# Patient Record
Sex: FEMALE | Race: WHITE | Employment: OTHER | URBAN - METROPOLITAN AREA
[De-identification: names, ages, dates, MRNs, and addresses within clinical notes are randomized per-mention and may not be internally consistent; named-entity substitution may affect disease eponyms.]

---

## 2022-01-31 DIAGNOSIS — N18.9 ANEMIA OF CHRONIC RENAL FAILURE, UNSPECIFIED CKD STAGE: ICD-10-CM

## 2022-01-31 DIAGNOSIS — D50.9 IRON DEFICIENCY ANEMIA, UNSPECIFIED IRON DEFICIENCY ANEMIA TYPE: ICD-10-CM

## 2022-01-31 DIAGNOSIS — D63.1 ANEMIA OF CHRONIC RENAL FAILURE, UNSPECIFIED CKD STAGE: ICD-10-CM

## 2022-02-10 ENCOUNTER — APPOINTMENT (OUTPATIENT)
Dept: CT IMAGING | Age: 72
End: 2022-02-10
Payer: MEDICARE

## 2022-02-10 ENCOUNTER — HOSPITAL ENCOUNTER (OUTPATIENT)
Dept: CT IMAGING | Age: 72
Discharge: HOME | End: 2022-02-12
Payer: MEDICARE

## 2022-02-10 VITALS — HEIGHT: 60 IN | BODY MASS INDEX: 27.48 KG/M2 | WEIGHT: 140 LBS

## 2022-02-10 DIAGNOSIS — K85.92 ACUTE PANCREATITIS WITH INFECTED NECROSIS, UNSPECIFIED PANCREATITIS TYPE: ICD-10-CM

## 2022-02-10 DIAGNOSIS — R07.9 CHEST PAIN, UNSPECIFIED TYPE: ICD-10-CM

## 2022-02-10 LAB
GFR AFRICAN AMERICAN: >60
GFR NON-AFRICAN AMERICAN: >60
PERFORMED ON: NORMAL
POC CREATININE: 0.6 MG/DL (ref 0.6–1.2)
POC SAMPLE TYPE: NORMAL

## 2022-02-10 PROCEDURE — 74177 CT ABD & PELVIS W/CONTRAST: CPT

## 2022-02-10 PROCEDURE — 2500000003 HC RX 250 WO HCPCS: Performed by: INTERNAL MEDICINE

## 2022-02-10 PROCEDURE — 71275 CT ANGIOGRAPHY CHEST: CPT

## 2022-02-10 PROCEDURE — 6360000004 HC RX CONTRAST MEDICATION: Performed by: INTERNAL MEDICINE

## 2022-02-10 RX ORDER — SODIUM CHLORIDE 0.9 % (FLUSH) 0.9 %
10 SYRINGE (ML) INJECTION 2 TIMES DAILY
Status: DISCONTINUED | OUTPATIENT
Start: 2022-02-10 | End: 2022-02-13 | Stop reason: HOSPADM

## 2022-02-10 RX ADMIN — BARIUM SULFATE 450 ML: 20 SUSPENSION ORAL at 16:45

## 2022-02-10 RX ADMIN — IOPAMIDOL 100 ML: 612 INJECTION, SOLUTION INTRAVENOUS at 16:43

## 2022-02-14 ENCOUNTER — HOSPITAL ENCOUNTER (OUTPATIENT)
Dept: INFUSION THERAPY | Age: 72
Setting detail: INFUSION SERIES
Discharge: HOME | End: 2022-02-14
Payer: MEDICARE

## 2022-02-14 VITALS
SYSTOLIC BLOOD PRESSURE: 118 MMHG | RESPIRATION RATE: 18 BRPM | HEART RATE: 65 BPM | TEMPERATURE: 98.5 F | DIASTOLIC BLOOD PRESSURE: 58 MMHG

## 2022-02-14 DIAGNOSIS — D63.1 ANEMIA OF CHRONIC RENAL FAILURE, UNSPECIFIED CKD STAGE: ICD-10-CM

## 2022-02-14 DIAGNOSIS — D50.9 IRON DEFICIENCY ANEMIA, UNSPECIFIED IRON DEFICIENCY ANEMIA TYPE: Primary | ICD-10-CM

## 2022-02-14 DIAGNOSIS — N18.9 ANEMIA OF CHRONIC RENAL FAILURE, UNSPECIFIED CKD STAGE: ICD-10-CM

## 2022-02-14 PROCEDURE — 6360000002 HC RX W HCPCS: Performed by: INTERNAL MEDICINE

## 2022-02-14 PROCEDURE — 2580000003 HC RX 258: Performed by: INTERNAL MEDICINE

## 2022-02-14 PROCEDURE — 96365 THER/PROPH/DIAG IV INF INIT: CPT

## 2022-02-14 RX ORDER — DICYCLOMINE HYDROCHLORIDE 10 MG/1
10 CAPSULE ORAL 3 TIMES DAILY
COMMUNITY
Start: 2022-01-18 | End: 2022-10-24 | Stop reason: ALTCHOICE

## 2022-02-14 RX ORDER — CITALOPRAM 40 MG/1
40 TABLET ORAL DAILY
COMMUNITY

## 2022-02-14 RX ORDER — POLYETHYLENE GLYCOL 3350 17 G/17G
17 POWDER, FOR SOLUTION ORAL DAILY
COMMUNITY
Start: 2022-01-27 | End: 2022-10-24 | Stop reason: ALTCHOICE

## 2022-02-14 RX ORDER — LEVOTHYROXINE SODIUM 0.05 MG/1
50 TABLET ORAL DAILY
COMMUNITY
Start: 2022-01-27

## 2022-02-14 RX ORDER — PANTOPRAZOLE SODIUM 40 MG/1
40 TABLET, DELAYED RELEASE ORAL DAILY
COMMUNITY
Start: 2022-01-18 | End: 2022-10-24 | Stop reason: ALTCHOICE

## 2022-02-14 RX ORDER — TRAZODONE HYDROCHLORIDE 150 MG/1
150 TABLET ORAL NIGHTLY
COMMUNITY

## 2022-02-14 RX ADMIN — IRON SUCROSE 200 MG: 20 INJECTION, SOLUTION INTRAVENOUS at 13:11

## 2022-02-14 NOTE — FLOWSHEET NOTE
Patient to the ambulatory for her Iron infusion. Vital signs taken. Denies any discomfort. Call light within reach. Education given both verbal and handout. Verbalized understanding.

## 2022-02-14 NOTE — FLOWSHEET NOTE
Addended by: NICK MIGUEL on: 2/8/2018 12:44 PM     Modules accepted: Orders     Infusion is complete. Tolerated well. Observation started. Call light within reach.

## 2022-10-24 ENCOUNTER — OFFICE VISIT (OUTPATIENT)
Dept: FAMILY MEDICINE CLINIC | Age: 72
End: 2022-10-24
Payer: COMMERCIAL

## 2022-10-24 VITALS
SYSTOLIC BLOOD PRESSURE: 160 MMHG | TEMPERATURE: 98.7 F | WEIGHT: 157.8 LBS | OXYGEN SATURATION: 98 % | HEIGHT: 60 IN | HEART RATE: 70 BPM | BODY MASS INDEX: 30.98 KG/M2 | DIASTOLIC BLOOD PRESSURE: 82 MMHG

## 2022-10-24 DIAGNOSIS — R03.0 ELEVATED BLOOD PRESSURE READING: Primary | ICD-10-CM

## 2022-10-24 DIAGNOSIS — F31.9 BIPOLAR 1 DISORDER, DEPRESSED (HCC): ICD-10-CM

## 2022-10-24 DIAGNOSIS — H53.9 ABNORMAL VISION: ICD-10-CM

## 2022-10-24 DIAGNOSIS — R26.89 LOSS OF BALANCE: ICD-10-CM

## 2022-10-24 PROBLEM — I40.1: Status: ACTIVE | Noted: 2022-07-20

## 2022-10-24 PROBLEM — R41.89 OTHER SYMPTOMS AND SIGNS INVOLVING COGNITIVE FUNCTIONS AND AWARENESS: Status: ACTIVE | Noted: 2022-07-29

## 2022-10-24 PROBLEM — G93.40 ENCEPHALOPATHY, UNSPECIFIED: Status: ACTIVE | Noted: 2022-07-29

## 2022-10-24 PROBLEM — M62.81 MUSCLE WEAKNESS (GENERALIZED): Status: ACTIVE | Noted: 2022-07-11

## 2022-10-24 PROBLEM — G72.9 MYOPATHY, UNSPECIFIED: Status: ACTIVE | Noted: 2022-08-24

## 2022-10-24 PROCEDURE — 1123F ACP DISCUSS/DSCN MKR DOCD: CPT | Performed by: FAMILY MEDICINE

## 2022-10-24 PROCEDURE — 99203 OFFICE O/P NEW LOW 30 MIN: CPT | Performed by: FAMILY MEDICINE

## 2022-10-24 SDOH — ECONOMIC STABILITY: FOOD INSECURITY: WITHIN THE PAST 12 MONTHS, THE FOOD YOU BOUGHT JUST DIDN'T LAST AND YOU DIDN'T HAVE MONEY TO GET MORE.: NEVER TRUE

## 2022-10-24 SDOH — ECONOMIC STABILITY: FOOD INSECURITY: WITHIN THE PAST 12 MONTHS, YOU WORRIED THAT YOUR FOOD WOULD RUN OUT BEFORE YOU GOT MONEY TO BUY MORE.: NEVER TRUE

## 2022-10-24 ASSESSMENT — PATIENT HEALTH QUESTIONNAIRE - PHQ9
SUM OF ALL RESPONSES TO PHQ QUESTIONS 1-9: 0
2. FEELING DOWN, DEPRESSED OR HOPELESS: 0
SUM OF ALL RESPONSES TO PHQ9 QUESTIONS 1 & 2: 0
SUM OF ALL RESPONSES TO PHQ QUESTIONS 1-9: 0
1. LITTLE INTEREST OR PLEASURE IN DOING THINGS: 0

## 2022-10-24 ASSESSMENT — SOCIAL DETERMINANTS OF HEALTH (SDOH): HOW HARD IS IT FOR YOU TO PAY FOR THE VERY BASICS LIKE FOOD, HOUSING, MEDICAL CARE, AND HEATING?: NOT HARD AT ALL

## 2022-10-24 NOTE — PROGRESS NOTES
Diagnosis Orders   1. Elevated blood pressure reading        2. Bipolar 1 disorder, depressed ([address]        3. Loss of balance        4. Abnormal vision          Return in about 4 weeks (around 11/21/2022) for for review of outcome of today's recommendation. Patient Instructions   Requesting records from prior physician. If most recent PCP does not have [place] and neurology records we will request them separately. Patient will be bringing in home blood pressure cuff to check for accuracy. If inaccurate will consider getting [facility] [employer] brand arm blood pressure cuff. Monitor blood pressure at home for 1 week and follow-up appointment then    Commend patient continue with specialist neurology and eye doctor to keep those problems up-to-date    Subjective:      Patient ID: Mick King is a 66 y.o. female who presents for:  Chief Complaint   Patient presents with    [facility]     Last PCP visit  about 1 month ago   Last mammo - nov of last year   Last colonoscopy - per pt 4 years ago   Recent labs - from Feb 2022    [address]       She has seen neurology for being off balance with muscle pain and muscle weakness. 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 She decided not to pursue it. Not sure if it will progress due to not doing the testing to determine final diagnosis. Last year moved to [facility] due to  dying.       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 History reviewed. No pertinent past medical history. Past Surgical History:   Procedure Laterality Date    CHOLECYSTECTOMY      HYSTERECTOMY (CERVIX STATUS UNKNOWN)       Social History     Socioeconomic History    Marital status:      Spouse name: Not on file    Number of children: Not on file    Years of education: Not on file    Highest education level: Not on file   Occupational History    Not on file   Tobacco Use    Smoking status: Former     Types: Cigarettes     Quit date: 0     Years since quittin.8    Smokeless tobacco: Never   Substance and Sexual Activity    Alcohol use: Yes     Comment: social    Drug use: Never    Sexual activity: Not on file   Other Topics Concern    Not on file   Social History Narrative    Not on file     Social Determinants of Health     Financial Resource Strain: Low Risk     Difficulty of Paying Living Expenses: Not hard at all   Food Insecurity: No Food Insecurity    Worried About Running Out of Food in the Last Year: Never true    Ran Out of Food in the Last Year: Never true   Transportation Needs: Not on file   Physical Activity: Not on file   Stress: Not on file   Social Connections: Not on file   Intimate Partner Violence: Not on file   Housing Stability: Not on file     History reviewed. No pertinent family history. Allergies:  Patient has no known allergies. Review of Systems    Objective:   BP (!) 160/82   Pulse 70   Temp 98.7 °F (37.1 °C)   Ht 5' (1.524 m)   Wt 157 lb 12.8 oz (71.6 kg)   SpO2 98%   BMI 30.82 kg/m²     Physical Exam    No results found for this visit on 10/24/22.     Recent Results (from the past 2016 hour(s))   MISC ARUP 1    Collection Time: 22  3:28 PM   Result Value Ref Range    [place] [facility] [id number],[id number]     Misc Test Order SEE NOTE    CK    Collection Time: 22  3:28 PM   Result Value Ref Range    Total  (H) 0 - 170 U/L       [] Pt was seen by provider for Minutes  Counseling and coordination of care was done for all assessment diagnosis listed for today with patient and any family/friend present. More than 50% of this visit was spent coordinating current care, obtaining information for prior records, and counseling for current plan of action. Assessment:       Diagnosis Orders   1. Elevated blood pressure reading        2. Bipolar 1 disorder, depressed ([facility] [address]        3. Loss of balance        4. Abnormal vision              No orders of the defined types were placed in this encounter. No orders of the defined types were placed in this encounter. Medication List            Accurate as of October 24, 2022  7:08 PM. If you have any questions, ask your nurse or doctor. CONTINUE taking these medications      citalopram 40 MG tablet  Commonly known as: CELEXA     levothyroxine 50 MCG tablet  Commonly known as: SYNTHROID     traZODone 150 MG tablet  Commonly known as: DESYREL            STOP taking these medications      dicyclomine 10 MG capsule  Commonly known as: BENTYL  Stopped by: John Dawn MD     GaviLAX 17 GM/SCOOP powder  Generic drug: polyethylene glycol  Stopped by: John Dawn MD     pantoprazole 40 MG tablet  Commonly known as: PROTONIX  Stopped by: John Dawn MD                Plan:   Return in about 4 weeks (around 11/21/2022) for for review of outcome of today's recommendation. Patient Instructions   Requesting records from prior physician. If most recent PCP does not have [place] and neurology records we will request them separately. Patient will be bringing in home blood pressure cuff to check for accuracy. If inaccurate will consider getting drug [employer] brand arm blood pressure cuff.   Monitor blood pressure at home for 1 week and follow-up appointment then    Commend patient continue with specialist neurology and eye doctor to keep those problems up-to-date    This note was partially created with the assistance of dictation. This may lead to grammatical or spelling errors. Gian Pressley M.D.

## 2022-10-24 NOTE — PATIENT INSTRUCTIONS
Requesting records from prior physician. If most recent PCP does not have [facility] and neurology records we will request them separately. Patient will be bringing in home blood pressure cuff to check for accuracy. If inaccurate will consider getting drug [employer] brand arm blood pressure cuff.   Monitor blood pressure at home for 1 week and follow-up appointment then    Commend patient continue with specialist neurology and eye doctor to keep those problems up-to-date

## 2022-11-04 ENCOUNTER — NURSE ONLY (OUTPATIENT)
Dept: FAMILY MEDICINE CLINIC | Age: 72
End: 2022-11-04

## 2022-11-04 VITALS — SYSTOLIC BLOOD PRESSURE: 134 MMHG | DIASTOLIC BLOOD PRESSURE: 64 MMHG

## 2022-11-04 DIAGNOSIS — Z01.30 BP CHECK: Primary | ICD-10-CM

## 2022-11-04 NOTE — PROGRESS NOTES
Pt in today with daughter. Machine and manual BP match up. Pt has appt on Mon. Daughter states that she will not be with her, son will. They are asking if you have received outside records yet and if so, when was last thyroid drawn? Pt struggling with fatigue and they are not sure if it is thyroid related or other meds.

## 2022-11-07 ENCOUNTER — OFFICE VISIT (OUTPATIENT)
Dept: FAMILY MEDICINE CLINIC | Age: 72
End: 2022-11-07
Payer: COMMERCIAL

## 2022-11-07 VITALS
BODY MASS INDEX: 31.57 KG/M2 | HEIGHT: 60 IN | SYSTOLIC BLOOD PRESSURE: 144 MMHG | WEIGHT: 160.8 LBS | HEART RATE: 73 BPM | OXYGEN SATURATION: 98 % | TEMPERATURE: 98.7 F | DIASTOLIC BLOOD PRESSURE: 72 MMHG

## 2022-11-07 DIAGNOSIS — D50.8 OTHER IRON DEFICIENCY ANEMIA: ICD-10-CM

## 2022-11-07 DIAGNOSIS — E66.09 CLASS 1 OBESITY DUE TO EXCESS CALORIES WITHOUT SERIOUS COMORBIDITY WITH BODY MASS INDEX (BMI) OF 31.0 TO 31.9 IN ADULT: ICD-10-CM

## 2022-11-07 DIAGNOSIS — I10 UNCONTROLLED HYPERTENSION: Primary | ICD-10-CM

## 2022-11-07 PROBLEM — F33.9 RECURRENT MAJOR DEPRESSION (HCC): Status: ACTIVE | Noted: 2022-11-07

## 2022-11-07 PROBLEM — E66.9 OBESITY WITH BODY MASS INDEX 30 OR GREATER: Status: ACTIVE | Noted: 2022-11-07

## 2022-11-07 PROCEDURE — 99213 OFFICE O/P EST LOW 20 MIN: CPT | Performed by: FAMILY MEDICINE

## 2022-11-07 PROCEDURE — 3074F SYST BP LT 130 MM HG: CPT | Performed by: FAMILY MEDICINE

## 2022-11-07 PROCEDURE — 1123F ACP DISCUSS/DSCN MKR DOCD: CPT | Performed by: FAMILY MEDICINE

## 2022-11-07 PROCEDURE — 3078F DIAST BP <80 MM HG: CPT | Performed by: FAMILY MEDICINE

## 2022-11-07 ASSESSMENT — ENCOUNTER SYMPTOMS
PHOTOPHOBIA: 0
ABDOMINAL PAIN: 0
CHEST TIGHTNESS: 0
SHORTNESS OF BREATH: 0
ABDOMINAL DISTENTION: 0

## 2022-11-07 NOTE — PROGRESS NOTES
Diagnosis Orders   1. Uncontrolled hypertension  TSH with Reflex    Lipid Panel    Basic Metabolic Panel      2. Other iron deficiency anemia  CBC with Auto Differential    Iron and TIBC      3. Class 1 obesity due to excess calories without serious comorbidity with body mass index (BMI) of 31.0 to 31.9 in adult          Return in about 4 weeks (around 2022). Patient Instructions   Patient will have health maintenance monitoring labs done today. Patient is going to focus on diet and exercise. Gradually increasing her exercise from 5 to 10 minutes a day to half hour at least 5 days a week. Continue to monitor blood pressure at least few days a week and rotating times of day. Follow-up appointment in 4 weeks    Subjective:      Patient ID: Mei Jose is a 66 y.o. female who presents for:  Chief Complaint   Patient presents with    Blood Pressure Check     X 2 week follow up        Patient is here for blood pressure follow-up. She has been checking it at home and the range has been systolic 108-146 and diastolic 46-23. She is interested in trying to do lifestyle change in order to control her blood pressure. Her   about 10 months ago and she has gained 20 pounds. She knows that is contributing and she would like to avoid further medication. We reviewed some of the medical history that is been obtained from her primary care previously and it appears she was diagnosed with a muscular disorder that is not defined completely. But myasthenia gravis has been ruled out. Current Outpatient Medications on File Prior to Visit   Medication Sig Dispense Refill    levothyroxine (SYNTHROID) 50 MCG tablet Take 50 mcg by mouth daily      citalopram (CELEXA) 40 MG tablet Take 40 mg by mouth daily      traZODone (DESYREL) 150 MG tablet Take 150 mg by mouth nightly       No current facility-administered medications on file prior to visit. History reviewed.  No pertinent past medical history. Past Surgical History:   Procedure Laterality Date    CHOLECYSTECTOMY      HYSTERECTOMY (CERVIX STATUS UNKNOWN)       Social History     Socioeconomic History    Marital status:      Spouse name: Not on file    Number of children: Not on file    Years of education: Not on file    Highest education level: Not on file   Occupational History    Not on file   Tobacco Use    Smoking status: Former     Packs/day: 0.50     Years: 10.00     Pack years: 5.00     Types: Cigarettes     Quit date: 0     Years since quittin.8    Smokeless tobacco: Never   Substance and Sexual Activity    Alcohol use: Yes     Comment: social    Drug use: Never    Sexual activity: Not on file   Other Topics Concern    Not on file   Social History Narrative    Not on file     Social Determinants of Health     Financial Resource Strain: Low Risk     Difficulty of Paying Living Expenses: Not hard at all   Food Insecurity: No Food Insecurity    Worried About Running Out of Food in the Last Year: Never true    Ran Out of Food in the Last Year: Never true   Transportation Needs: Not on file   Physical Activity: Not on file   Stress: Not on file   Social Connections: Not on file   Intimate Partner Violence: Not on file   Housing Stability: Not on file     History reviewed. No pertinent family history. Allergies:  Patient has no known allergies. Review of Systems   Constitutional:  Negative for activity change, appetite change, diaphoresis and unexpected weight change. Eyes:  Negative for photophobia and visual disturbance. Respiratory:  Negative for chest tightness and shortness of breath. No orthopnea   Cardiovascular:  Negative for chest pain, palpitations and leg swelling. Gastrointestinal:  Negative for abdominal distention and abdominal pain. Genitourinary:  Negative for flank pain and frequency. Musculoskeletal:  Negative for gait problem and joint swelling.    Neurological:  Negative for dizziness, weakness, light-headedness and headaches. Psychiatric/Behavioral:  Negative for confusion. Objective:   BP (!) 144/72   Pulse 73   Temp 98.7 °F (37.1 °C)   Ht 5' (1.524 m)   Wt 160 lb 12.8 oz (72.9 kg)   SpO2 98%   BMI 31.40 kg/m²     Physical Exam  Constitutional:       General: She is not in acute distress. Appearance: She is well-developed. She is not diaphoretic. HENT:      Head: Normocephalic and atraumatic. Right Ear: External ear normal.      Left Ear: External ear normal.      Nose: Nose normal.   Eyes:      General:         Right eye: No discharge. Left eye: No discharge. Conjunctiva/sclera: Conjunctivae normal.      Pupils: Pupils are equal, round, and reactive to light. Neck:      Thyroid: No thyromegaly. Trachea: No tracheal deviation. Cardiovascular:      Rate and Rhythm: Normal rate and regular rhythm. Pulmonary:      Effort: Pulmonary effort is normal. No respiratory distress. Abdominal:      General: There is no distension. Musculoskeletal:      Cervical back: Neck supple. Skin:     General: Skin is warm and dry. Findings: No bruising or rash. Neurological:      Mental Status: She is alert. Coordination: Coordination normal.   Psychiatric:         Thought Content: Thought content normal.         Judgment: Judgment normal.       No results found for this visit on 11/07/22. No results found for this or any previous visit (from the past 2016 hour(s)). [] Pt was seen by provider for      Minutes  Counseling and coordination of care was done for all assessment diagnosis listed for today with patient and any family/friend present. More than 50% of this visit was spent coordinating current care, obtaining information for prior records, and counseling for current plan of action. Assessment:       Diagnosis Orders   1. Uncontrolled hypertension  TSH with Reflex    Lipid Panel    Basic Metabolic Panel      2.  Other iron deficiency anemia  CBC with Auto Differential    Iron and TIBC      3. Class 1 obesity due to excess calories without serious comorbidity with body mass index (BMI) of 31.0 to 31.9 in adult              Orders Placed This Encounter   Procedures    TSH with Reflex     Standing Status:   Future     Standing Expiration Date:   11/7/2023    Lipid Panel     Standing Status:   Future     Standing Expiration Date:   11/7/2023    CBC with Auto Differential     Standing Status:   Future     Standing Expiration Date:   11/7/2023    Iron and TIBC     Standing Status:   Future     Standing Expiration Date:   93/4/5144    Basic Metabolic Panel     Standing Status:   Future     Standing Expiration Date:   11/7/2023       No orders of the defined types were placed in this encounter. Medication List            Accurate as of November 7, 2022  4:23 PM. If you have any questions, ask your nurse or doctor. CONTINUE taking these medications      citalopram 40 MG tablet  Commonly known as: CELEXA     levothyroxine 50 MCG tablet  Commonly known as: SYNTHROID     traZODone 150 MG tablet  Commonly known as: DESYREL                Plan:   Return in about 4 weeks (around 12/5/2022). Patient Instructions   Patient will have health maintenance monitoring labs done today. Patient is going to focus on diet and exercise. Gradually increasing her exercise from 5 to 10 minutes a day to half hour at least 5 days a week. Continue to monitor blood pressure at least few days a week and rotating times of day. Follow-up appointment in 4 weeks    This note was partially created with the assistance of dictation. This may lead to grammatical or spelling errors. Gian Dominguez M.D.

## 2022-11-07 NOTE — PATIENT INSTRUCTIONS
Patient will have health maintenance monitoring labs done today. Patient is going to focus on diet and exercise. Gradually increasing her exercise from 5 to 10 minutes a day to half hour at least 5 days a week. Continue to monitor blood pressure at least few days a week and rotating times of day.     Follow-up appointment in 4 weeks

## 2022-11-08 ENCOUNTER — TELEPHONE (OUTPATIENT)
Dept: FAMILY MEDICINE CLINIC | Age: 72
End: 2022-11-08

## 2022-11-09 DIAGNOSIS — D50.8 OTHER IRON DEFICIENCY ANEMIA: ICD-10-CM

## 2022-11-09 DIAGNOSIS — I10 UNCONTROLLED HYPERTENSION: ICD-10-CM

## 2022-11-09 LAB
ANION GAP SERPL CALCULATED.3IONS-SCNC: 16 MEQ/L (ref 9–15)
BASOPHILS ABSOLUTE: 0.1 K/UL (ref 0–0.2)
BASOPHILS RELATIVE PERCENT: 0.8 %
BUN BLDV-MCNC: 15 MG/DL (ref 8–23)
CALCIUM SERPL-MCNC: 9.8 MG/DL (ref 8.5–9.9)
CHLORIDE BLD-SCNC: 102 MEQ/L (ref 95–107)
CHOLESTEROL, TOTAL: 228 MG/DL (ref 0–199)
CO2: 23 MEQ/L (ref 20–31)
CREAT SERPL-MCNC: 0.7 MG/DL (ref 0.5–0.9)
EOSINOPHILS ABSOLUTE: 0.1 K/UL (ref 0–0.7)
EOSINOPHILS RELATIVE PERCENT: 1.5 %
GFR SERPL CREATININE-BSD FRML MDRD: >60 ML/MIN/{1.73_M2}
GLUCOSE BLD-MCNC: 99 MG/DL (ref 70–99)
HCT VFR BLD CALC: 45 % (ref 37–47)
HDLC SERPL-MCNC: 90 MG/DL (ref 40–59)
HEMOGLOBIN: 14.7 G/DL (ref 12–16)
LDL CHOLESTEROL CALCULATED: 117 MG/DL (ref 0–129)
LYMPHOCYTES ABSOLUTE: 2 K/UL (ref 1–4.8)
LYMPHOCYTES RELATIVE PERCENT: 26.7 %
MCH RBC QN AUTO: 31.3 PG (ref 27–31.3)
MCHC RBC AUTO-ENTMCNC: 32.8 % (ref 33–37)
MCV RBC AUTO: 95.4 FL (ref 79.4–94.8)
MONOCYTES ABSOLUTE: 0.6 K/UL (ref 0.2–0.8)
MONOCYTES RELATIVE PERCENT: 7.9 %
NEUTROPHILS ABSOLUTE: 4.8 K/UL (ref 1.4–6.5)
NEUTROPHILS RELATIVE PERCENT: 63.1 %
PDW BLD-RTO: 14.3 % (ref 11.5–14.5)
PLATELET # BLD: 230 K/UL (ref 130–400)
POTASSIUM SERPL-SCNC: 5 MEQ/L (ref 3.4–4.9)
RBC # BLD: 4.72 M/UL (ref 4.2–5.4)
SODIUM BLD-SCNC: 141 MEQ/L (ref 135–144)
TRIGL SERPL-MCNC: 103 MG/DL (ref 0–150)
TSH REFLEX: 1.91 UIU/ML (ref 0.44–3.86)
WBC # BLD: 7.6 K/UL (ref 4.8–10.8)

## 2022-11-10 LAB
IRON SATURATION: 23 % (ref 20–55)
IRON: 85 UG/DL (ref 37–145)
TOTAL IRON BINDING CAPACITY: 377 UG/DL (ref 250–450)
UNSATURATED IRON BINDING CAPACITY: 292 UG/DL (ref 112–347)

## 2022-12-07 ENCOUNTER — OFFICE VISIT (OUTPATIENT)
Dept: FAMILY MEDICINE CLINIC | Age: 72
End: 2022-12-07
Payer: COMMERCIAL

## 2022-12-07 VITALS
HEART RATE: 67 BPM | WEIGHT: 159.4 LBS | SYSTOLIC BLOOD PRESSURE: 124 MMHG | TEMPERATURE: 98 F | HEIGHT: 60 IN | OXYGEN SATURATION: 97 % | BODY MASS INDEX: 31.29 KG/M2 | DIASTOLIC BLOOD PRESSURE: 70 MMHG

## 2022-12-07 DIAGNOSIS — D50.8 OTHER IRON DEFICIENCY ANEMIA: ICD-10-CM

## 2022-12-07 DIAGNOSIS — R03.0 ELEVATED BLOOD PRESSURE READING: Primary | ICD-10-CM

## 2022-12-07 DIAGNOSIS — J40 BRONCHITIS: ICD-10-CM

## 2022-12-07 DIAGNOSIS — F31.9 BIPOLAR 1 DISORDER, DEPRESSED (HCC): ICD-10-CM

## 2022-12-07 PROCEDURE — 1123F ACP DISCUSS/DSCN MKR DOCD: CPT | Performed by: FAMILY MEDICINE

## 2022-12-07 PROCEDURE — 99214 OFFICE O/P EST MOD 30 MIN: CPT | Performed by: FAMILY MEDICINE

## 2022-12-07 RX ORDER — ESCITALOPRAM OXALATE 10 MG/1
10 TABLET ORAL DAILY
Qty: 30 TABLET | Refills: 5 | Status: SHIPPED | OUTPATIENT
Start: 2022-12-07

## 2022-12-07 RX ORDER — AZITHROMYCIN 250 MG/1
250 TABLET, FILM COATED ORAL SEE ADMIN INSTRUCTIONS
Qty: 6 TABLET | Refills: 0 | Status: SHIPPED | OUTPATIENT
Start: 2022-12-07 | End: 2022-12-12

## 2022-12-07 RX ORDER — ESCITALOPRAM OXALATE 10 MG/1
10 TABLET ORAL DAILY
COMMUNITY
End: 2022-12-07 | Stop reason: SDUPTHER

## 2022-12-07 ASSESSMENT — ENCOUNTER SYMPTOMS
ABDOMINAL DISTENTION: 0
CHEST TIGHTNESS: 0
COUGH: 1
PHOTOPHOBIA: 0
SHORTNESS OF BREATH: 0
WHEEZING: 0
ABDOMINAL PAIN: 0

## 2022-12-07 NOTE — PROGRESS NOTES
Diagnosis Orders   1. Elevated blood pressure reading        2. Other iron deficiency anemia        3. Bipolar 1 disorder, depressed (HCC)  escitalopram (LEXAPRO) 10 MG tablet      4. Bronchitis  azithromycin (ZITHROMAX) 250 MG tablet        Return for for routine major medical condition management. Patient Instructions   Recommended pt to use Mucinex 1200 mg twice daily for congestion and cough for a week. No change in medication for medical conditions    Zithromax daily for bronchitis until gone    Lexapro controlling mood disorder that is lowering bp. No need for hypertensive meds    Subjective:      Patient ID: Vandana Avitia is a 66 y.o. female who presents for:  Chief Complaint   Patient presents with    1 Month Follow-Up    Hypertension    Discuss Medications     Pt asking if you will fill Lexapro. States originally getting from Psychiatrist in [place] but she is no longer seeing them    Thyroid Problem     Asking about levels and continuing med       Patient denies any cardiovascular symptoms. See review of systems. She has a deep chest cough for which she is bringing up green sputum without fever at this time. No treatment from home. Wondered if she could stop her thyroid medication so we talked about the pathology of the disease and how the medication works with. Feels good on her Lexapro. More comfortable. Blood pressure is well controlled. Current Outpatient Medications on File Prior to Visit   Medication Sig Dispense Refill    levothyroxine (SYNTHROID) 50 MCG tablet Take 50 mcg by mouth daily      traZODone (DESYREL) 150 MG tablet Take 150 mg by mouth nightly       No current facility-administered medications on file prior to visit. History reviewed. No pertinent past medical history. Past Surgical History:   Procedure Laterality Date    CHOLECYSTECTOMY      HYSTERECTOMY (CERVIX STATUS UNKNOWN)       Social History     Socioeconomic History    Marital status:   Spouse name: Not on file    Number of children: Not on file    Years of education: Not on file    Highest education level: Not on file   Occupational History    Not on file   Tobacco Use    Smoking status: Former     Packs/day: 0.50     Years: 10.00     Pack years: 5.00     Types: Cigarettes     Quit date: 0     Years since quittin.9    Smokeless tobacco: Never   Substance and Sexual Activity    Alcohol use: Yes     Comment: social    Drug use: Never    Sexual activity: Not on file   Other Topics Concern    Not on file   Social History Narrative    Not on file     Social Determinants of Health     Financial Resource Strain: Low Risk     Difficulty of Paying Living Expenses: Not hard at all   Food Insecurity: No Food Insecurity    Worried About Running Out of Food in the Last Year: Never true    Ran Out of Food in the Last Year: Never true   Transportation Needs: Not on file   Physical Activity: Not on file   Stress: Not on file   Social Connections: Not on file   Intimate Partner Violence: Not on file   Housing Stability: Not on file     History reviewed. No pertinent family history. Allergies:  Patient has no known allergies. Review of Systems   Constitutional:  Negative for activity change, appetite change, diaphoresis and unexpected weight change. Eyes:  Negative for photophobia and visual disturbance. Respiratory:  Positive for cough. Negative for chest tightness, shortness of breath and wheezing. No orthopnea   Cardiovascular:  Negative for chest pain, palpitations and leg swelling. Gastrointestinal:  Negative for abdominal distention and abdominal pain. Genitourinary:  Negative for flank pain and frequency. Musculoskeletal:  Negative for gait problem and joint swelling. Neurological:  Negative for dizziness, weakness, light-headedness and headaches. Psychiatric/Behavioral:  Negative for confusion.       Objective:   /70   Pulse 67   Temp 98 °F (36.7 °C)   Ht 5' (1.524 m)   Wt 159 lb 6.4 oz (72.3 kg)   SpO2 97%   BMI 31.13 kg/m²     Physical Exam  Constitutional:       General: She is not in acute distress. Appearance: She is well-developed. She is not diaphoretic. HENT:      Head: Normocephalic and atraumatic. Right Ear: External ear normal.      Left Ear: External ear normal.      Nose: Nose normal.   Eyes:      General:         Right eye: No discharge. Left eye: No discharge. Conjunctiva/sclera: Conjunctivae normal.      Pupils: Pupils are equal, round, and reactive to light. Neck:      Thyroid: No thyromegaly. Trachea: No tracheal deviation. Cardiovascular:      Rate and Rhythm: Normal rate and regular rhythm. Heart sounds: Normal heart sounds. Pulmonary:      Effort: Pulmonary effort is normal. No respiratory distress. Breath sounds: Normal breath sounds. Abdominal:      General: There is no distension. Musculoskeletal:      Cervical back: Neck supple. Skin:     General: Skin is warm and dry. Findings: No bruising or rash. Neurological:      Mental Status: She is alert. Coordination: Coordination normal.   Psychiatric:         Thought Content: Thought content normal.         Judgment: Judgment normal.       No results found for this visit on 12/07/22.     Recent Results (from the past 2016 hour(s))   Basic Metabolic Panel    Collection Time: 11/09/22  3:21 PM   Result Value Ref Range    Sodium 141 135 - 144 mEq/L    Potassium 5.0 (H) 3.4 - 4.9 mEq/L    Chloride 102 95 - 107 mEq/L    CO2 23 20 - 31 mEq/L    Anion Gap 16 (H) 9 - 15 mEq/L    Glucose 99 70 - 99 mg/dL    BUN 15 8 - 23 mg/dL    Creatinine 0.70 0.50 - 0.90 mg/dL    Est, Glom Filt Rate >60.0 >60    Calcium 9.8 8.5 - 9.9 mg/dL   Iron and TIBC    Collection Time: 11/09/22  3:21 PM   Result Value Ref Range    Iron 85 37 - 145 ug/dL    TIBC 377 250 - 450 ug/dL    Iron Saturation 23 20 - 55 %    UIBC 292 112 - 347 ug/dL   CBC with Auto Differential Collection Time: 11/09/22  3:21 PM   Result Value Ref Range    WBC 7.6 4.8 - 10.8 K/uL    RBC 4.72 4.20 - 5.40 M/uL    Hemoglobin 14.7 12.0 - 16.0 g/dL    Hematocrit 45.0 37.0 - 47.0 %    MCV 95.4 (H) 79.4 - 94.8 fL    MCH 31.3 27.0 - 31.3 pg    MCHC 32.8 (L) 33.0 - 37.0 %    RDW 14.3 11.5 - 14.5 %    Platelets 403 748 - 079 K/uL    Neutrophils % 63.1 %    Lymphocytes % 26.7 %    Monocytes % 7.9 %    Eosinophils % 1.5 %    Basophils % 0.8 %    Neutrophils Absolute 4.8 1.4 - 6.5 K/uL    Lymphocytes Absolute 2.0 1.0 - 4.8 K/uL    Monocytes Absolute 0.6 0.2 - 0.8 K/uL    Eosinophils Absolute 0.1 0.0 - 0.7 K/uL    Basophils Absolute 0.1 0.0 - 0.2 K/uL   Lipid Panel    Collection Time: 11/09/22  3:21 PM   Result Value Ref Range    Cholesterol, Total 228 (H) 0 - 199 mg/dL    Triglycerides 103 0 - 150 mg/dL    HDL 90 (H) 40 - 59 mg/dL    LDL Calculated 117 0 - 129 mg/dL   TSH with Reflex    Collection Time: 11/09/22  3:21 PM   Result Value Ref Range    TSH 1.910 0.440 - 3.860 uIU/mL       [] Pt was seen by provider for      Minutes  Counseling and coordination of care was done for all assessment diagnosis listed for today with patient and any family/friend present. More than 50% of this visit was spent coordinating current care, obtaining information for prior records, and counseling for current plan of action. Assessment:       Diagnosis Orders   1. Elevated blood pressure reading        2. Other iron deficiency anemia        3. Bipolar 1 disorder, depressed (HCC)  escitalopram (LEXAPRO) 10 MG tablet      4. Bronchitis  azithromycin (ZITHROMAX) 250 MG tablet            No orders of the defined types were placed in this encounter.       Orders Placed This Encounter   Medications    escitalopram (LEXAPRO) 10 MG tablet     Sig: Take 1 tablet by mouth daily     Dispense:  30 tablet     Refill:  5    azithromycin (ZITHROMAX) 250 MG tablet     Sig: Take 1 tablet by mouth See Admin Instructions for 5 days 500mg on day 1 followed by 250mg on days 2 - 5     Dispense:  6 tablet     Refill:  0            Medication List            Accurate as of December 7, 2022  4:58 PM. If you have any questions, ask your nurse or doctor. START taking these medications      azithromycin 250 MG tablet  Commonly known as: ZITHROMAX  Take 1 tablet by mouth See Admin Instructions for 5 days 500mg on day 1 followed by 250mg on days 2 - 5  Started by: Melodie Perez MD            CONTINUE taking these medications      escitalopram 10 MG tablet  Commonly known as: Lexapro  Take 1 tablet by mouth daily     levothyroxine 50 MCG tablet  Commonly known as: SYNTHROID     traZODone 150 MG tablet  Commonly known as: DESYREL            STOP taking these medications      citalopram 40 MG tablet  Commonly known as: CELEXA  Stopped by: Melodie Perez MD               Where to Get Your Medications        These medications were sent to [address] [place] [address]      Phone: [phone]   azithromycin 250 MG tablet  escitalopram 10 MG tablet           Plan:   Return for for routine major medical condition management. Patient Instructions   Recommended pt to use Mucinex 1200 mg twice daily for congestion and cough for a week. No change in medication for medical conditions    Zithromax daily for bronchitis until gone    Lexapro controlling mood disorder that is lowering bp. No need for hypertensive meds    This note was partially created with the assistance of dictation. This may lead to grammatical or spelling errors. Gian Hayden M.D.

## 2022-12-07 NOTE — PATIENT INSTRUCTIONS
Recommended pt to use Mucinex 1200 mg twice daily for congestion and cough for a week. No change in medication for medical conditions    Zithromax daily for bronchitis until gone    Lexapro controlling mood disorder that is lowering bp.   No need for hypertensive meds

## 2023-01-24 ENCOUNTER — OFFICE VISIT (OUTPATIENT)
Dept: FAMILY MEDICINE CLINIC | Age: 73
End: 2023-01-24
Payer: COMMERCIAL

## 2023-01-24 VITALS
BODY MASS INDEX: 32.2 KG/M2 | DIASTOLIC BLOOD PRESSURE: 80 MMHG | WEIGHT: 164 LBS | HEART RATE: 60 BPM | OXYGEN SATURATION: 97 % | SYSTOLIC BLOOD PRESSURE: 120 MMHG | TEMPERATURE: 98.6 F | HEIGHT: 60 IN

## 2023-01-24 DIAGNOSIS — J40 BRONCHITIS: Primary | ICD-10-CM

## 2023-01-24 LAB
INFLUENZA A ANTIBODY: NORMAL
INFLUENZA B ANTIBODY: NORMAL
Lab: NORMAL
PERFORMING INSTRUMENT: NORMAL
QC PASS/FAIL: NORMAL
SARS-COV-2, POC: NORMAL

## 2023-01-24 PROCEDURE — 1123F ACP DISCUSS/DSCN MKR DOCD: CPT | Performed by: STUDENT IN AN ORGANIZED HEALTH CARE EDUCATION/TRAINING PROGRAM

## 2023-01-24 PROCEDURE — 87804 INFLUENZA ASSAY W/OPTIC: CPT | Performed by: STUDENT IN AN ORGANIZED HEALTH CARE EDUCATION/TRAINING PROGRAM

## 2023-01-24 PROCEDURE — 99213 OFFICE O/P EST LOW 20 MIN: CPT | Performed by: STUDENT IN AN ORGANIZED HEALTH CARE EDUCATION/TRAINING PROGRAM

## 2023-01-24 PROCEDURE — 87426 SARSCOV CORONAVIRUS AG IA: CPT | Performed by: STUDENT IN AN ORGANIZED HEALTH CARE EDUCATION/TRAINING PROGRAM

## 2023-01-24 RX ORDER — AZITHROMYCIN 250 MG/1
250 TABLET, FILM COATED ORAL SEE ADMIN INSTRUCTIONS
Qty: 6 TABLET | Refills: 0 | Status: SHIPPED | OUTPATIENT
Start: 2023-01-24 | End: 2023-01-29

## 2023-01-24 ASSESSMENT — ENCOUNTER SYMPTOMS
SORE THROAT: 0
SINUS PAIN: 0
VOMITING: 0
SHORTNESS OF BREATH: 0
SINUS PRESSURE: 0
WHEEZING: 0
RHINORRHEA: 0
COUGH: 1
ABDOMINAL PAIN: 0

## 2023-01-24 ASSESSMENT — PATIENT HEALTH QUESTIONNAIRE - PHQ9
SUM OF ALL RESPONSES TO PHQ QUESTIONS 1-9: 0
10. IF YOU CHECKED OFF ANY PROBLEMS, HOW DIFFICULT HAVE THESE PROBLEMS MADE IT FOR YOU TO DO YOUR WORK, TAKE CARE OF THINGS AT HOME, OR GET ALONG WITH OTHER PEOPLE: 0
4. FEELING TIRED OR HAVING LITTLE ENERGY: 0
5. POOR APPETITE OR OVEREATING: 0
9. THOUGHTS THAT YOU WOULD BE BETTER OFF DEAD, OR OF HURTING YOURSELF: 0
6. FEELING BAD ABOUT YOURSELF - OR THAT YOU ARE A FAILURE OR HAVE LET YOURSELF OR YOUR FAMILY DOWN: 0
7. TROUBLE CONCENTRATING ON THINGS, SUCH AS READING THE NEWSPAPER OR WATCHING TELEVISION: 0
8. MOVING OR SPEAKING SO SLOWLY THAT OTHER PEOPLE COULD HAVE NOTICED. OR THE OPPOSITE, BEING SO FIGETY OR RESTLESS THAT YOU HAVE BEEN MOVING AROUND A LOT MORE THAN USUAL: 0
3. TROUBLE FALLING OR STAYING ASLEEP: 0
2. FEELING DOWN, DEPRESSED OR HOPELESS: 0
SUM OF ALL RESPONSES TO PHQ QUESTIONS 1-9: 0

## 2023-01-24 NOTE — PROGRESS NOTES
2023    Timi Montanez (:  1950) is a 67 y.o. female, here for evaluation of the following medical concerns:  Chief Complaint   Patient presents with    Congestion    Cough     Wet cough x 2 days      HPI  URI  Symptoms started 2 days  Endorsing nasal congestion, productive cough (green sputum production)  Denied fevers, chills, wheezing, shortness of breath, GI symptoms  Has tried nothing OTC  No recent sick contacts  No history of asthma or COPD     Internal Administration   First Dose COVID-19, MODERNA BLUE border, Primary or Immunocompromised, (age 12y+), IM, 100 mcg/0.5mL  2022   Second Dose COVID-19, PFIZER Bivalent BOOSTER, DO NOT Dilute, (age 12y+), IM, 30 mcg/0.3 mL   09/10/2022       Last COVID Lab No results found for: SARS-COV-2, SARS-COV-2 RNA, SARS-COV-2, SARS-COV-2, SARS-COV-2 BY PCR, SARS-COV-2, SARS-COV-2, SARS-COV-2     Review of Systems   Constitutional:  Negative for chills and fever. HENT:  Positive for congestion. Negative for rhinorrhea, sinus pressure, sinus pain and sore throat. Respiratory:  Positive for cough. Negative for shortness of breath and wheezing. Cardiovascular:  Negative for chest pain and palpitations. Gastrointestinal:  Negative for abdominal pain and vomiting. Musculoskeletal:  Negative for arthralgias and myalgias. Skin:  Negative for rash and wound. Neurological:  Negative for speech difficulty and light-headedness. Psychiatric/Behavioral:  Negative for suicidal ideas. The patient is not nervous/anxious. Prior to Visit Medications    Medication Sig Taking?  Authorizing Provider   azithromycin (ZITHROMAX) 250 MG tablet Take 1 tablet by mouth See Admin Instructions for 5 days 500mg on day 1 followed by 250mg on days 2 - 5 Yes Nicky Correa DO   escitalopram (LEXAPRO) 10 MG tablet Take 1 tablet by mouth daily Yes Felipa Mcmullen MD   levothyroxine (SYNTHROID) 50 MCG tablet Take 50 mcg by mouth daily Yes Historical Provider, MD traZODone (DESYREL) 150 MG tablet Take 150 mg by mouth nightly Yes Historical Provider, MD        There are no discontinued medications. No Known Allergies    History reviewed. No pertinent past medical history. Past Surgical History:   Procedure Laterality Date    CHOLECYSTECTOMY      HYSTERECTOMY (CERVIX STATUS UNKNOWN)         Social History     Socioeconomic History    Marital status:      Spouse name: Not on file    Number of children: Not on file    Years of education: Not on file    Highest education level: Not on file   Occupational History    Not on file   Tobacco Use    Smoking status: Former     Packs/day: 0.50     Years: 10.00     Pack years: 5.00     Types: Cigarettes     Quit date: 0     Years since quittin.0    Smokeless tobacco: Never   Substance and Sexual Activity    Alcohol use: Yes     Comment: social    Drug use: Never    Sexual activity: Not on file   Other Topics Concern    Not on file   Social History Narrative    Not on file     Social Determinants of Health     Financial Resource Strain: Low Risk     Difficulty of Paying Living Expenses: Not hard at all   Food Insecurity: No Food Insecurity    Worried About Running Out of Food in the Last Year: Never true    Ran Out of Food in the Last Year: Never true   Transportation Needs: Not on file   Physical Activity: Not on file   Stress: Not on file   Social Connections: Not on file   Intimate Partner Violence: Not on file   Housing Stability: Not on file        History reviewed. No pertinent family history. Vitals:    23 1618   BP: 120/80   Pulse: 60   Temp: 98.6 °F (37 °C)   SpO2: 97%   Weight: 164 lb (74.4 kg)   Height: 5' (1.524 m)       Estimated body mass index is 32.03 kg/m² as calculated from the following:    Height as of this encounter: 5' (1.524 m). Weight as of this encounter: 164 lb (74.4 kg). No results for input(s): WBC, RBC, HGB, HCT, MCV, MCH, MCHC, RDW, PLT, MPV in the last 72 hours.     No results for input(s): NA, K, CL, CO2, BUN, CREATININE, GLUCOSE, CALCIUM, PROT, LABALBU, BILITOT, ALKPHOS, AST, ALT in the last 72 hours. No results found for: LABA1C    No results found. Physical Exam  Constitutional:       Appearance: Normal appearance. She is normal weight. HENT:      Head: Normocephalic and atraumatic. Eyes:      Extraocular Movements: Extraocular movements intact. Conjunctiva/sclera: Conjunctivae normal.   Cardiovascular:      Rate and Rhythm: Normal rate and regular rhythm. Pulses: Normal pulses. Heart sounds: Normal heart sounds. Pulmonary:      Effort: Pulmonary effort is normal.      Breath sounds: Normal breath sounds. No wheezing or rales. Skin:     General: Skin is warm. Capillary Refill: Capillary refill takes less than 2 seconds. Findings: No rash. Neurological:      Mental Status: She is alert. Psychiatric:         Mood and Affect: Mood normal.         Thought Content: Thought content normal.         Judgment: Judgment normal.       ASSESSMENT/PLAN:  1. Bronchitis  Point-of-care COVID and influenza negative  Patient will start Mucinex 1200 mg twice daily for congestion and cough  If her symptoms do not improve or worsen in the next few days she will start Z-Justin  Continue supportive care  Follow-up precautions given    - azithromycin (ZITHROMAX) 250 MG tablet; Take 1 tablet by mouth See Admin Instructions for 5 days 500mg on day 1 followed by 250mg on days 2 - 5  Dispense: 6 tablet; Refill: 0    There are no discontinued medications.    ---------------------------------------------------------------------  Side effects, adverse effects of the medication prescribed today, as well as treatment plan/ rationale and result expectations have been discussed with the patient who expresses understanding and desires to proceed. Close follow up to evaluate treatment results and for coordination of care.   I have reviewed the patient's medical history in detail and updated the computerized patient record. As always, patient is advised that if symptoms worsen in any way they will proceed to the nearest emergency room. --------------------------------------------------------------------    Return if symptoms worsen or fail to improve. An  electronic signature was used to authenticate this note.     --Ash Vazquez, DO on 1/24/2023 at 4:58 PM

## 2023-02-02 ENCOUNTER — OFFICE VISIT (OUTPATIENT)
Dept: FAMILY MEDICINE CLINIC | Age: 73
End: 2023-02-02
Payer: COMMERCIAL

## 2023-02-02 VITALS
WEIGHT: 164 LBS | OXYGEN SATURATION: 99 % | DIASTOLIC BLOOD PRESSURE: 78 MMHG | HEART RATE: 73 BPM | SYSTOLIC BLOOD PRESSURE: 132 MMHG | BODY MASS INDEX: 32.2 KG/M2 | TEMPERATURE: 98.2 F | RESPIRATION RATE: 16 BRPM | HEIGHT: 60 IN

## 2023-02-02 DIAGNOSIS — B37.0 ORAL THRUSH: Primary | ICD-10-CM

## 2023-02-02 DIAGNOSIS — J06.9 VIRAL URI WITH COUGH: ICD-10-CM

## 2023-02-02 PROCEDURE — 99213 OFFICE O/P EST LOW 20 MIN: CPT | Performed by: NURSE PRACTITIONER

## 2023-02-02 PROCEDURE — 1123F ACP DISCUSS/DSCN MKR DOCD: CPT | Performed by: NURSE PRACTITIONER

## 2023-02-02 RX ORDER — BENZONATATE 100 MG/1
100 CAPSULE ORAL 3 TIMES DAILY PRN
Qty: 21 CAPSULE | Refills: 0 | Status: SHIPPED | OUTPATIENT
Start: 2023-02-02 | End: 2023-02-09

## 2023-02-02 RX ORDER — CITALOPRAM 40 MG/1
TABLET ORAL
COMMUNITY

## 2023-02-02 SDOH — ECONOMIC STABILITY: INCOME INSECURITY: HOW HARD IS IT FOR YOU TO PAY FOR THE VERY BASICS LIKE FOOD, HOUSING, MEDICAL CARE, AND HEATING?: NOT HARD AT ALL

## 2023-02-02 SDOH — ECONOMIC STABILITY: FOOD INSECURITY: WITHIN THE PAST 12 MONTHS, YOU WORRIED THAT YOUR FOOD WOULD RUN OUT BEFORE YOU GOT MONEY TO BUY MORE.: NEVER TRUE

## 2023-02-02 SDOH — ECONOMIC STABILITY: FOOD INSECURITY: WITHIN THE PAST 12 MONTHS, THE FOOD YOU BOUGHT JUST DIDN'T LAST AND YOU DIDN'T HAVE MONEY TO GET MORE.: NEVER TRUE

## 2023-02-02 SDOH — ECONOMIC STABILITY: HOUSING INSECURITY
IN THE LAST 12 MONTHS, WAS THERE A TIME WHEN YOU DID NOT HAVE A STEADY PLACE TO SLEEP OR SLEPT IN A SHELTER (INCLUDING NOW)?: NO

## 2023-02-02 ASSESSMENT — ENCOUNTER SYMPTOMS
CONSTIPATION: 0
COUGH: 1
SINUS PAIN: 0
APNEA: 0
VOICE CHANGE: 0
SORE THROAT: 0
ABDOMINAL DISTENTION: 0
WHEEZING: 0
DIARRHEA: 0
SHORTNESS OF BREATH: 0
VOMITING: 0
NAUSEA: 0

## 2023-02-02 NOTE — PROGRESS NOTES
Subjective:      Patient ID: Hood Rai is a 67 y.o. female who presents today for:  Chief Complaint   Patient presents with    Cough     Pt report she has a on and off at times persistent cough, feels like something stuck in throat and phlegm noted in throat, constantly clearing throat and feels like it is in chest.   Pt reports she is vaccinated for covid. Pt wanted another ATB and I advised her and son that an ATB will intensify her yeast in mouth. CXR ordered. PT DECLINES ANY COVID OR FLU TESTING TODAY. PT REPORTS SHE WAS TESTED 2 WEEKS AGO AND DECLINES TODAY. Cough  This is a recurrent problem. The current episode started 1 to 4 weeks ago (since bfore the 24th). The problem has been gradually worsening. The cough is Productive of sputum (pt report she \"feels like something stuck in throat and phlegm). Associated symptoms include postnasal drip. Pertinent negatives include no chest pain, chills, ear pain, fever, headaches, rash, sore throat, shortness of breath, weight loss or wheezing. Nothing aggravates the symptoms. Treatments tried: was on an atb. The treatment provided mild relief. Her past medical history is significant for bronchitis. There is no history of asthma, COPD, emphysema or pneumonia. Pt here today with her son. Pt is adamant that Fela Alvarado still has uri infection due to her cough which she reports is \"worse with laying down at night\". Pt was seen by Dr. Kathryn Richard on 1/24/23 and treated with an ATB. Pt declines any SOB, fevers, chills, N/V weakness. History reviewed. No pertinent past medical history. Past Surgical History:   Procedure Laterality Date    CHOLECYSTECTOMY      HYSTERECTOMY (CERVIX STATUS UNKNOWN)       Social History     Socioeconomic History    Marital status:       Spouse name: Not on file    Number of children: Not on file    Years of education: Not on file    Highest education level: Not on file   Occupational History    Not on file   Tobacco Use Smoking status: Former     Packs/day: 0.50     Years: 10.00     Pack years: 5.00     Types: Cigarettes     Quit date: 1972     Years since quittin.1    Smokeless tobacco: Never   Substance and Sexual Activity    Alcohol use: Yes     Comment: social    Drug use: Never    Sexual activity: Not on file   Other Topics Concern    Not on file   Social History Narrative    Not on file     Social Determinants of Health     Financial Resource Strain: Low Risk     Difficulty of Paying Living Expenses: Not hard at all   Food Insecurity: No Food Insecurity    Worried About [address] in the Last Year: Never true    [address] in the Last Year: Never true   Transportation Needs: Unknown    Lack of Transportation (Medical): Not on file    Lack of Transportation (Non-Medical): No   Physical Activity: Not on file   Stress: Not on file   Social Connections: Not on file   Intimate Partner Violence: Not on file   Housing Stability: Unknown    Unable to Pay for Housing in the Last Year: Not on file    Number of Places Lived in the Last Year: Not on file    Unstable Housing in the Last Year: No     History reviewed. No pertinent family history. No Known Allergies      Review of Systems   Constitutional:  Negative for activity change, chills, diaphoresis, fever and weight loss. HENT:  Positive for postnasal drip. Negative for congestion, ear pain, sinus pain, sore throat and voice change. Pt reports \"weird taste at times and feels like something stuck in throat\"   Respiratory:  Positive for cough (pt reports can\"get persistent at night after laying down\"). Negative for apnea, shortness of breath and wheezing. Cardiovascular:  Negative for chest pain and palpitations. Gastrointestinal:  Negative for abdominal distention, constipation, diarrhea, nausea and vomiting. Genitourinary:  Negative for dysuria and urgency. Musculoskeletal:  Negative for arthralgias. Skin:  Negative for rash.    Neurological: Negative for dizziness, weakness and headaches. Hematological:  Negative for adenopathy. Psychiatric/Behavioral:  Negative for agitation and confusion. All other systems reviewed and are negative. Objective:   /78 (Site: Right Upper Arm, Position: Sitting, Cuff Size: Medium Adult)   Pulse 73   Temp 98.2 °F (36.8 °C) (Temporal)   Resp 16   Ht 5' (1.524 m)   Wt 164 lb (74.4 kg)   SpO2 99%   BMI 32.03 kg/m²     Physical Exam  Vitals and nursing note reviewed. Constitutional:       General: She is awake. She is not in acute distress. Appearance: Normal appearance. She is well-developed, well-groomed and normal weight. She is not ill-appearing, toxic-appearing or diaphoretic. HENT:      Head: Normocephalic and atraumatic. Right Ear: Tympanic membrane normal.      Left Ear: Tympanic membrane normal.      Nose: Nose normal. No congestion or rhinorrhea. Mouth/Throat:      Lips: Pink. No lesions. Mouth: Mucous membranes are moist. No angioedema. Dentition: No dental abscesses. Pharynx: Oropharynx is clear. No oropharyngeal exudate or posterior oropharyngeal erythema. Tonsils: No tonsillar exudate or tonsillar abscesses. Eyes:      Extraocular Movements: Extraocular movements intact. Conjunctiva/sclera: Conjunctivae normal.      Pupils: Pupils are equal, round, and reactive to light. Cardiovascular:      Rate and Rhythm: Normal rate and regular rhythm. Pulses: Normal pulses. Heart sounds: Normal heart sounds. No murmur heard. Pulmonary:      Effort: Pulmonary effort is normal. No tachypnea, bradypnea, accessory muscle usage or respiratory distress. Breath sounds: Normal breath sounds and air entry. No decreased air movement or transmitted upper airway sounds. No decreased breath sounds, wheezing, rhonchi or rales. Comments: Lungs are clear on exam.   Chest:      Chest wall: No tenderness. Abdominal:      General: Abdomen is flat. Bowel sounds are normal. There is no distension. Palpations: Abdomen is soft. Tenderness: There is no abdominal tenderness. There is no left CVA tenderness, guarding or rebound. Musculoskeletal:         General: No deformity or signs of injury. Normal range of motion. Cervical back: Normal range of motion and neck supple. Lymphadenopathy:      Cervical: No cervical adenopathy. Skin:     General: Skin is warm and dry. Capillary Refill: Capillary refill takes less than 2 seconds. Findings: No bruising or erythema. Neurological:      General: No focal deficit present. Mental Status: She is alert and oriented to person, place, and time. Mental status is at baseline. Motor: No weakness. Coordination: Coordination normal.   Psychiatric:         Attention and Perception: Attention and perception normal.         Mood and Affect: Affect normal. Mood is anxious. Speech: Speech normal.         Behavior: Behavior normal. Behavior is cooperative. Thought Content: Thought content normal.         Judgment: Judgment normal.       Assessment:       Diagnosis Orders   1. Oral thrush  nystatin (MYCOSTATIN) [id number] UNIT/ML suspension    XR CHEST STANDARD (2 VW)      2. Viral URI with cough  benzonatate (TESSALON) 100 MG capsule            Plan:      Orders Placed This Encounter   Procedures    XR CHEST STANDARD (2 VW)     Standing Status:   Future     Standing Expiration Date:   2/2/2024     Order Specific Question:   Reason for exam:     Answer:   r/o any lung issue       Orders Placed This Encounter   Medications    nystatin (MYCOSTATIN) [id number] UNIT/ML suspension     Sig: Take 5 mLs by mouth 4 times daily for 14 days Swish and swallow over 2 minutes.      Dispense:  280 mL     Refill:  0    benzonatate (TESSALON) 100 MG capsule     Sig: Take 1 capsule by mouth 3 times daily as needed for Cough     Dispense:  21 capsule     Refill:  0     Pt here with her son and he is aware that she has an oral yeast infection and the importance of her taking the med 4 x a day for 2 weeks and to follow up with her PCP in 2 weeks. Pt shows no distress today. Pt and her son left the [facility] today in stable condition. Discussed signs and symptoms which require immediate follow-up in ED/call to 911. Patient verbalized understanding. Discussed with Carley Han that this appears to be a viral infection   Emphasized importance of avoiding unnecessary antibiotic therapy  Discussed usual time course/progression of viral infections  Discussed tx includes symptom management and supportive care with:  Adequate rest, adequate hydration, vaporizer/humidification, sleep w/ HOB elevated  OTC analgesics/antipyretics as needed  Avoidance of adverse environmental factors such as: relevant allergens, cigarette smoke, pollution, barotrauma  Worsening signs and symptoms discussed  Follow-up as needed for persistent/worsening  or appearance of new symptoms. Return if symptoms worsen or fail to improve. Reviewed with the patient: current clinical status, medications, activities and diet. Side effects, adverse effects of the medication prescribed today, as well as treatment plan and result expectations have been discussed with the patient who expresses understanding and desires to proceed. Close follow up to evaluate treatment results and for coordination of care. I have reviewed the patient's medical history in detail and updated the computerized patient record.       Sharon Fung, ENA - CNP

## 2023-02-28 ENCOUNTER — TELEPHONE (OUTPATIENT)
Dept: FAMILY MEDICINE CLINIC | Age: 73
End: 2023-02-28

## 2023-02-28 ENCOUNTER — TELEPHONE (OUTPATIENT)
Dept: GASTROENTEROLOGY | Age: 73
End: 2023-02-28

## 2023-05-04 ENCOUNTER — OFFICE VISIT (OUTPATIENT)
Dept: BEHAVIORAL/MENTAL HEALTH CLINIC | Age: 73
End: 2023-05-04

## 2023-05-04 VITALS
WEIGHT: 159 LBS | BODY MASS INDEX: 31.22 KG/M2 | SYSTOLIC BLOOD PRESSURE: 138 MMHG | HEIGHT: 60 IN | DIASTOLIC BLOOD PRESSURE: 72 MMHG

## 2023-05-04 DIAGNOSIS — F31.81 MODERATE BIPOLAR II DISORDER, MOST RECENT EPISODE MAJOR DEPRESSIVE (HCC): ICD-10-CM

## 2023-05-04 RX ORDER — OXCARBAZEPINE 150 MG/1
150 TABLET, FILM COATED ORAL 2 TIMES DAILY
Qty: 60 TABLET | Refills: 0 | Status: SHIPPED | OUTPATIENT
Start: 2023-05-04

## 2023-05-04 RX ORDER — ESCITALOPRAM OXALATE 5 MG/1
5 TABLET ORAL DAILY
Qty: 30 TABLET | Refills: 0 | Status: SHIPPED | OUTPATIENT
Start: 2023-05-04

## 2023-05-04 RX ORDER — TRAZODONE HYDROCHLORIDE 50 MG/1
TABLET ORAL
Qty: 30 TABLET | Refills: 0 | Status: SHIPPED | OUTPATIENT
Start: 2023-05-04

## 2023-05-04 ASSESSMENT — PATIENT HEALTH QUESTIONNAIRE - PHQ9
6. FEELING BAD ABOUT YOURSELF - OR THAT YOU ARE A FAILURE OR HAVE LET YOURSELF OR YOUR FAMILY DOWN: 2
5. POOR APPETITE OR OVEREATING: 2
SUM OF ALL RESPONSES TO PHQ QUESTIONS 1-9: 19
3. TROUBLE FALLING OR STAYING ASLEEP: 3
8. MOVING OR SPEAKING SO SLOWLY THAT OTHER PEOPLE COULD HAVE NOTICED. OR THE OPPOSITE, BEING SO FIGETY OR RESTLESS THAT YOU HAVE BEEN MOVING AROUND A LOT MORE THAN USUAL: 2
SUM OF ALL RESPONSES TO PHQ QUESTIONS 1-9: 19
SUM OF ALL RESPONSES TO PHQ9 QUESTIONS 1 & 2: 6
SUM OF ALL RESPONSES TO PHQ QUESTIONS 1-9: 19
10. IF YOU CHECKED OFF ANY PROBLEMS, HOW DIFFICULT HAVE THESE PROBLEMS MADE IT FOR YOU TO DO YOUR WORK, TAKE CARE OF THINGS AT HOME, OR GET ALONG WITH OTHER PEOPLE: 1
9. THOUGHTS THAT YOU WOULD BE BETTER OFF DEAD, OR OF HURTING YOURSELF: 1
4. FEELING TIRED OR HAVING LITTLE ENERGY: 3
SUM OF ALL RESPONSES TO PHQ QUESTIONS 1-9: 18
2. FEELING DOWN, DEPRESSED OR HOPELESS: 3
7. TROUBLE CONCENTRATING ON THINGS, SUCH AS READING THE NEWSPAPER OR WATCHING TELEVISION: 0
1. LITTLE INTEREST OR PLEASURE IN DOING THINGS: 3

## 2023-05-04 NOTE — PROGRESS NOTES
INITIAL PSYCHIATRIC EVALUATION (OUTPATIENT)      Date of Service: 5/4/2023  Purpose:  Psychiatric Evaluation with Medication Assessment with psychotherapy  Referral Source: referred by Dr Ml Hylton  History  From: patient, EMR  Record Review: moderate    Chief Complaint  New Patient ([facility])    History of Present Illness    Loretta Wilson is a 67 y.o. female  with lives home alone, retired a history significant for Bipolar that has worsened over the past several weeks. Pt states she has not been functioning to the level she typically does. States she was out with friends then she feels likes her brain just shuts down. Pt reports when she is feeling happy she feels over the top, but when she feels down she doesn't want to get out of bed. Pt reports increased social anxiety, able to attend social setting settings but then reports experiencing negative thinking for appx 1 week after wards, like a \"mental crash\"    Venecia/Hypomania:    Severity: moderate  Duration: 1-4 days  Quality: Mood swings pattern -  mild   Content: Persistently elevated, expansive or irritable mood with increase activity and energy  Associated symptoms: irritable, mood swings  Inflated self esteem  Decrease need for sleep  Hyper talkative, pressured speech  Racing thoughts/ flights of ideas  Distractibility  Impulsive behavior: denies    Duration: 2 weeks  Severity: Rating mood to be around 2/10 (10- good)  Quality:melancholic  Worse all day  Content: Hopeless, worthless and helpless feeling  Suicidal thoughts passive- thoughts going to sleep and not waking up   Associated symptoms:  Poor concentration, anhedonia, decrease motivation  Sleep and appetite- adequate     Pt reports long standing mental health history, believes she was diagnosed bipolar as a young adult.      Stressors: social settings    Greatest source of support is daughter      Social History:  Childhood:\"traumatic, both parents alcoholic\"  Psychological

## 2023-05-09 ENCOUNTER — TELEPHONE (OUTPATIENT)
Dept: BEHAVIORAL/MENTAL HEALTH CLINIC | Age: 73
End: 2023-05-09

## 2023-05-09 NOTE — TELEPHONE ENCOUNTER
Patient states that since the medication change from the New Patient appointment she reports that she is not doing well, she would like to know if she can go back to her original dose of Lexapro which is 10MG.

## 2023-05-11 ENCOUNTER — OFFICE VISIT (OUTPATIENT)
Dept: BEHAVIORAL/MENTAL HEALTH CLINIC | Age: 73
End: 2023-05-11

## 2023-05-11 VITALS — SYSTOLIC BLOOD PRESSURE: 138 MMHG | WEIGHT: 158 LBS | DIASTOLIC BLOOD PRESSURE: 74 MMHG | BODY MASS INDEX: 30.86 KG/M2

## 2023-05-11 DIAGNOSIS — F31.81 MODERATE BIPOLAR II DISORDER, MOST RECENT EPISODE MAJOR DEPRESSIVE (HCC): Primary | ICD-10-CM

## 2023-05-11 PROCEDURE — 99214 OFFICE O/P EST MOD 30 MIN: CPT | Performed by: REGISTERED NURSE

## 2023-05-11 PROCEDURE — 1123F ACP DISCUSS/DSCN MKR DOCD: CPT | Performed by: REGISTERED NURSE

## 2023-05-26 ENCOUNTER — OFFICE VISIT (OUTPATIENT)
Dept: BEHAVIORAL/MENTAL HEALTH CLINIC | Age: 73
End: 2023-05-26

## 2023-05-26 DIAGNOSIS — F31.81 MODERATE BIPOLAR II DISORDER, MOST RECENT EPISODE MAJOR DEPRESSIVE (HCC): Primary | ICD-10-CM

## 2023-05-26 NOTE — PROGRESS NOTES
PSYCHIATRIC MEDICATION MANAGEMENT PROGRESS NOTE  Radha Godinez, [address]    5/26/23  12:36 PM EDT   Patient was seen and examined in person, Chart reviewed   Patient's case discussed with other treatment providers       Time spent with Patient: 30 minutes    Chief Complaint: 67 y.o. female seen for follow-up visit for medication management of The encounter diagnosis was Moderate bipolar II disorder, most recent episode major depressive ([address] . Visit attended by patient and son    Subjective: Increased depression 8/10, poor motivation, poor concentration, tearfulness; states she no longer is going out and socializing. Flatter affect, states she feels very down and depressed since starting the trileptal.    Denies recent periods of hypomania, however son states episode were present throughout their childhood, as well into his adulthood. Reports patient has had a couple days of increased energy, decreased need for sleep, increase thought process for a few day then would go into depression for several days following. Son further reports patient disclosed at time certain days when she felt the depression was severe she would take an extra dose of Lexapro; pt would not report how frequently but states she has not done that since the last follow up. Unsure how frequently she was doing this or the last date. Pt reports sleep has improved. Passive SI, no plan or intent. Denies SI HI AVH; no delusions noted    Patient reports they have been compliant with current medication regimen and have not missed a dose. Patient denies medication side effects. At today's visit, patient denies thoughts of harm to self or others since last appointment, and denies auditory or visual hallucinations.        Appetite (since last visit):   [x] Normal/Unchanged  [] Increased  [] Decreased      Sleep (since last visit):   [x] Normal/Unchanged  [] Increased  [] Decreased      Energy:    [] Normal/Unchanged  [] Increased  [x]

## 2023-05-26 NOTE — PATIENT INSTRUCTIONS
Day 1-7;  Take 5mg Lexapro daily (0.5 tablet of current 10mg supply)   Day 8-14; take 5mg lexapro every other day  Day 15; stop Lexapro    Today DC Trileptal due to worsening symptoms    Start Caplyta  42mg QD

## 2023-05-30 RX ORDER — TRAZODONE HYDROCHLORIDE 50 MG/1
TABLET ORAL
Qty: 30 TABLET | Refills: 0 | Status: SHIPPED | OUTPATIENT
Start: 2023-05-30

## 2023-05-30 NOTE — TELEPHONE ENCOUNTER
Patient is requesting a refill.     LOV  5/26/2023  Scheduled  6/8/2023    Callback # [phone]; ok to leave a VM

## 2023-06-07 ENCOUNTER — OFFICE VISIT (OUTPATIENT)
Dept: FAMILY MEDICINE CLINIC | Age: 73
End: 2023-06-07
Payer: COMMERCIAL

## 2023-06-07 VITALS
HEART RATE: 54 BPM | TEMPERATURE: 98.7 F | OXYGEN SATURATION: 96 % | SYSTOLIC BLOOD PRESSURE: 110 MMHG | DIASTOLIC BLOOD PRESSURE: 66 MMHG | WEIGHT: 156 LBS | HEIGHT: 60 IN | BODY MASS INDEX: 30.63 KG/M2

## 2023-06-07 DIAGNOSIS — D75.89 MACROCYTOSIS: ICD-10-CM

## 2023-06-07 DIAGNOSIS — E03.8 OTHER SPECIFIED HYPOTHYROIDISM: ICD-10-CM

## 2023-06-07 DIAGNOSIS — F31.81 MODERATE BIPOLAR II DISORDER, MOST RECENT EPISODE MAJOR DEPRESSIVE (HCC): ICD-10-CM

## 2023-06-07 DIAGNOSIS — R06.02 SHORTNESS OF BREATH: ICD-10-CM

## 2023-06-07 DIAGNOSIS — R03.0 ELEVATED BLOOD PRESSURE READING: ICD-10-CM

## 2023-06-07 DIAGNOSIS — R07.2 PRECORDIAL PAIN: Primary | ICD-10-CM

## 2023-06-07 DIAGNOSIS — D63.1 ANEMIA OF CHRONIC RENAL FAILURE, UNSPECIFIED CKD STAGE: ICD-10-CM

## 2023-06-07 DIAGNOSIS — N18.9 ANEMIA OF CHRONIC RENAL FAILURE, UNSPECIFIED CKD STAGE: ICD-10-CM

## 2023-06-07 PROCEDURE — 1123F ACP DISCUSS/DSCN MKR DOCD: CPT | Performed by: FAMILY MEDICINE

## 2023-06-07 PROCEDURE — 99214 OFFICE O/P EST MOD 30 MIN: CPT | Performed by: FAMILY MEDICINE

## 2023-06-07 RX ORDER — OMEPRAZOLE 40 MG/1
40 CAPSULE, DELAYED RELEASE ORAL DAILY PRN
COMMUNITY

## 2023-06-07 RX ORDER — ESCITALOPRAM OXALATE 5 MG/1
5 TABLET ORAL DAILY
Qty: 1 TABLET | Refills: 0
Start: 2023-06-07 | End: 2023-06-08

## 2023-06-07 NOTE — PROGRESS NOTES
Diagnosis Orders   1. Precordial pain  CARDIAC STRESS TEST EXERCISE ONLY      2. Shortness of breath  CARDIAC STRESS TEST EXERCISE ONLY      3. Elevated blood pressure reading  Basic Metabolic Panel      4. Anemia of chronic renal failure, unspecified CKD stage  CBC with Auto Differential      5. Macrocytosis  CBC with Auto Differential    Vitamin B12 & Folate      6. Other specified hypothyroidism  TSH with Reflex      7. Moderate bipolar II disorder, most recent episode major depressive ([address]  escitalopram (LEXAPRO) 5 MG tablet        Return in about 4 weeks (around 7/5/2023) for for routine major medical condition management. Patient Instructions   Patient will be scheduled for stress test to evaluate for fluctuating blood pressure, chest pain, shortness of breath. Patient working with psychiatry based on [employer] testing to adjust medications for bipolar disorder. Patient will reinstitute omeprazole in 2-week episodes as needed for acid reflux which is also probably what is causing the choking coughing. Continue with ocular surgeon evaluation of my situation. Health maintenance testing such as mammogram and colonoscopy will be addressed in the future once we are through the chest pain evaluation. Subjective:      Patient ID: Shabana Huerta is a 67 y.o. female who presents for:  Chief Complaint   Patient presents with    Hypertension     X 6 month check up     Health Maintenance     Discuss colonoscopy & mammogram        Acid reflux history with omeprazole 40 mg for 2 week episodic     Chest pain- a pain she can touch, she sits and relax to help and sometimes wont go away. She can selin sob that is painful to breath  Cough to the point of choking up phlegm  Fatigue  Sob  Upper back pain    Psychiatry changing meds due to crying, sleeping for half the day, not wanting to deal with anyone, getting a shower was a struggle, did take care of the dog. Long history of bipolar. [employer] test was done.

## 2023-06-07 NOTE — PATIENT INSTRUCTIONS
Patient will be scheduled for stress test to evaluate for fluctuating blood pressure, chest pain, shortness of breath. Patient working with psychiatry based on [place] [employer] testing to adjust medications for bipolar disorder. Patient will reinstitute omeprazole in 2-week episodes as needed for acid reflux which is also probably what is causing the choking coughing. Continue with ocular surgeon evaluation of my situation. Health maintenance testing such as mammogram and colonoscopy will be addressed in the future once we are through the chest pain evaluation.

## 2023-06-08 ENCOUNTER — OFFICE VISIT (OUTPATIENT)
Dept: BEHAVIORAL/MENTAL HEALTH CLINIC | Age: 73
End: 2023-06-08
Payer: COMMERCIAL

## 2023-06-08 VITALS — WEIGHT: 155 LBS | DIASTOLIC BLOOD PRESSURE: 70 MMHG | SYSTOLIC BLOOD PRESSURE: 120 MMHG | BODY MASS INDEX: 30.27 KG/M2

## 2023-06-08 DIAGNOSIS — N18.9 ANEMIA OF CHRONIC RENAL FAILURE, UNSPECIFIED CKD STAGE: ICD-10-CM

## 2023-06-08 DIAGNOSIS — G47.00 INSOMNIA, UNSPECIFIED TYPE: ICD-10-CM

## 2023-06-08 DIAGNOSIS — D75.89 MACROCYTOSIS: ICD-10-CM

## 2023-06-08 DIAGNOSIS — R03.0 ELEVATED BLOOD PRESSURE READING: ICD-10-CM

## 2023-06-08 DIAGNOSIS — D63.1 ANEMIA OF CHRONIC RENAL FAILURE, UNSPECIFIED CKD STAGE: ICD-10-CM

## 2023-06-08 DIAGNOSIS — E87.5 HYPERKALEMIA: Primary | ICD-10-CM

## 2023-06-08 DIAGNOSIS — F31.81 MODERATE BIPOLAR II DISORDER, MOST RECENT EPISODE MAJOR DEPRESSIVE (HCC): Primary | ICD-10-CM

## 2023-06-08 DIAGNOSIS — E03.8 OTHER SPECIFIED HYPOTHYROIDISM: ICD-10-CM

## 2023-06-08 LAB
ANION GAP SERPL CALCULATED.3IONS-SCNC: 12 MEQ/L (ref 9–15)
BASOPHILS # BLD: 0.1 K/UL (ref 0–0.2)
BASOPHILS NFR BLD: 0.8 %
BUN SERPL-MCNC: 8 MG/DL (ref 8–23)
CALCIUM SERPL-MCNC: 8.9 MG/DL (ref 8.5–9.9)
CHLORIDE SERPL-SCNC: 107 MEQ/L (ref 95–107)
CO2 SERPL-SCNC: 24 MEQ/L (ref 20–31)
CREAT SERPL-MCNC: 0.54 MG/DL (ref 0.5–0.9)
EOSINOPHIL # BLD: 0.6 K/UL (ref 0–0.7)
EOSINOPHIL NFR BLD: 6.3 %
ERYTHROCYTE [DISTWIDTH] IN BLOOD BY AUTOMATED COUNT: 14.2 % (ref 11.5–14.5)
GLUCOSE SERPL-MCNC: 120 MG/DL (ref 70–99)
HCT VFR BLD AUTO: 41.5 % (ref 37–47)
HGB BLD-MCNC: 13.6 G/DL (ref 12–16)
LYMPHOCYTES # BLD: 1.3 K/UL (ref 1–4.8)
LYMPHOCYTES NFR BLD: 14.3 %
MCH RBC QN AUTO: 29.1 PG (ref 27–31.3)
MCHC RBC AUTO-ENTMCNC: 32.7 % (ref 33–37)
MCV RBC AUTO: 89 FL (ref 79.4–94.8)
MONOCYTES # BLD: 0.6 K/UL (ref 0.2–0.8)
MONOCYTES NFR BLD: 6.7 %
NEUTROPHILS # BLD: 6.6 K/UL (ref 1.4–6.5)
NEUTS SEG NFR BLD: 71.9 %
PLATELET # BLD AUTO: 327 K/UL (ref 130–400)
POTASSIUM SERPL-SCNC: 5.6 MEQ/L (ref 3.4–4.9)
RBC # BLD AUTO: 4.66 M/UL (ref 4.2–5.4)
SODIUM SERPL-SCNC: 143 MEQ/L (ref 135–144)
TSH REFLEX: 1.7 UIU/ML (ref 0.44–3.86)
WBC # BLD AUTO: 9.2 K/UL (ref 4.8–10.8)

## 2023-06-08 PROCEDURE — 90833 PSYTX W PT W E/M 30 MIN: CPT | Performed by: REGISTERED NURSE

## 2023-06-08 PROCEDURE — 1123F ACP DISCUSS/DSCN MKR DOCD: CPT | Performed by: REGISTERED NURSE

## 2023-06-08 PROCEDURE — 99213 OFFICE O/P EST LOW 20 MIN: CPT | Performed by: REGISTERED NURSE

## 2023-06-08 RX ORDER — TRAZODONE HYDROCHLORIDE 50 MG/1
TABLET ORAL
Qty: 30 TABLET | Refills: 1 | Status: SHIPPED | OUTPATIENT
Start: 2023-06-08

## 2023-06-08 NOTE — PROGRESS NOTES
PSYCHIATRIC MEDICATION MANAGEMENT PROGRESS NOTE  Roger Live, [address]    5/26/23  12:36 PM EDT   Patient was seen and examined in person, Chart reviewed   Patient's case discussed with other treatment providers       Time spent with Patient: 30 minutes    Chief Complaint: 67 y.o. female seen for follow-up visit for medication management of The primary encounter diagnosis was Moderate bipolar II disorder, most recent episode major depressive ([address] A diagnosis of Insomnia, unspecified type was also pertinent to this visit. . Visit attended by patient and son    Subjective:      Pt accompanied to appointment with daughter. Daughter reports significant improvements in patients mood. Pt reports significantly improved mood. Minimal depression. Reports motivation and energy have increased. Affect appears brighter, smiling. Pt states she is able to have many more days of sustained energy and motivation however does fatigue after several days; pt reports decreased energy is not accompanied by depression or lose of desire to remain social or engage in activities. Denies impulsive or risky behavior. Pt reports sleep has improved. Feels rested, falls asleep well and remains asleep throughout the evening. Denies nightmares     Denies passive SI, no plan or intent. Denies HI AVH; no delusions noted    Patient reports they have been compliant with current medication regimen and have not missed a dose; pt states she has continues to take lexapro every other day; previously was advised to DC after 14 days of titration. Patient denies medication side effects. At today's visit, patient denies thoughts of harm to self or others since last appointment, and denies auditory or visual hallucinations.        Appetite (since last visit):   [x] Normal/Unchanged  [] Increased  [] Decreased      Sleep (since last visit):   [x] Normal/Unchanged  [] Increased  [] Decreased      Energy:    [x] Normal/Unchanged  [] Increased  []

## 2023-06-08 NOTE — RESULT ENCOUNTER NOTE
Notify patient potassium came back high approaching dangerous levels.   Be sure to hydrate well tonight and get blood work rechecked in the morning to see if the level has come back down

## 2023-06-09 DIAGNOSIS — E87.5 HYPERKALEMIA: ICD-10-CM

## 2023-06-09 LAB
ANION GAP SERPL CALCULATED.3IONS-SCNC: 16 MEQ/L (ref 9–15)
BUN SERPL-MCNC: 6 MG/DL (ref 8–23)
CALCIUM SERPL-MCNC: 9.7 MG/DL (ref 8.5–9.9)
CHLORIDE SERPL-SCNC: 101 MEQ/L (ref 95–107)
CO2 SERPL-SCNC: 23 MEQ/L (ref 20–31)
CREAT SERPL-MCNC: 0.54 MG/DL (ref 0.5–0.9)
FOLATE: 8.3 NG/ML
GLUCOSE SERPL-MCNC: 158 MG/DL (ref 70–99)
POTASSIUM SERPL-SCNC: 5.3 MEQ/L (ref 3.4–4.9)
SODIUM SERPL-SCNC: 140 MEQ/L (ref 135–144)
VITAMIN B-12: 1057 PG/ML (ref 232–1245)

## 2023-06-14 NOTE — RESULT ENCOUNTER NOTE
Patient continues to have elevated potassium but not at a dangerous level. We will monitor every 3 months for changes.   May be secondary to her Caplyta medication

## 2023-06-28 ENCOUNTER — HOSPITAL ENCOUNTER (OUTPATIENT)
Dept: NON INVASIVE DIAGNOSTICS | Age: 73
Discharge: HOME | End: 2023-06-28
Payer: COMMERCIAL

## 2023-06-28 PROCEDURE — 93017 CV STRESS TEST TRACING ONLY: CPT

## 2023-07-06 ENCOUNTER — TELEPHONE (OUTPATIENT)
Dept: FAMILY MEDICINE CLINIC | Age: 73
End: 2023-07-06

## 2023-07-06 DIAGNOSIS — R06.02 SHORTNESS OF BREATH: ICD-10-CM

## 2023-07-06 DIAGNOSIS — R07.2 PRECORDIAL PAIN: Primary | ICD-10-CM

## 2023-07-13 DIAGNOSIS — R06.02 SHORTNESS OF BREATH: ICD-10-CM

## 2023-07-13 DIAGNOSIS — R07.2 PRECORDIAL PAIN: Primary | ICD-10-CM

## 2023-07-19 NOTE — TELEPHONE ENCOUNTER
[facility] calling to let office know that the wrong test was ordered they need a Nuclear medicine Stress test in order for patient to be scheduled.      Would like to have a call back to pt daughter [phone]

## 2023-07-20 ENCOUNTER — OFFICE VISIT (OUTPATIENT)
Dept: BEHAVIORAL/MENTAL HEALTH CLINIC | Age: 73
End: 2023-07-20
Payer: COMMERCIAL

## 2023-07-20 VITALS — DIASTOLIC BLOOD PRESSURE: 70 MMHG | SYSTOLIC BLOOD PRESSURE: 124 MMHG | BODY MASS INDEX: 30.47 KG/M2 | WEIGHT: 156 LBS

## 2023-07-20 DIAGNOSIS — F31.81 MODERATE BIPOLAR II DISORDER, MOST RECENT EPISODE MAJOR DEPRESSIVE (HCC): Primary | ICD-10-CM

## 2023-07-20 DIAGNOSIS — G47.00 INSOMNIA, UNSPECIFIED TYPE: ICD-10-CM

## 2023-07-20 PROCEDURE — 99214 OFFICE O/P EST MOD 30 MIN: CPT | Performed by: REGISTERED NURSE

## 2023-07-20 PROCEDURE — 1123F ACP DISCUSS/DSCN MKR DOCD: CPT | Performed by: REGISTERED NURSE

## 2023-07-20 PROCEDURE — 90833 PSYTX W PT W E/M 30 MIN: CPT | Performed by: REGISTERED NURSE

## 2023-07-20 RX ORDER — TRAZODONE HYDROCHLORIDE 50 MG/1
TABLET ORAL
Qty: 90 TABLET | Refills: 1 | Status: SHIPPED | OUTPATIENT
Start: 2023-07-20

## 2023-07-20 NOTE — PROGRESS NOTES
PSYCHIATRIC MEDICATION MANAGEMENT PROGRESS NOTE  Leandra Pickering, [address]    5/26/23  12:36 PM EDT   Patient was seen and examined in person, Chart reviewed   Patient's case discussed with other treatment providers       Time spent with Patient: 30 minutes    Chief Complaint: 67 y.o. female seen for follow-up visit for medication management of The primary encounter diagnosis was Moderate bipolar II disorder, most recent episode major depressive ([address] A diagnosis of Insomnia, unspecified type was also pertinent to this visit. . Visit attended by patient and son    Subjective:      Pt accompanied to appointment with daughter. Daughter reports significant improvements in patients mood. Pt reports significantly improved mood. Denies depression. Reports motivation and energy have increased. Affect appears brighter, smiling. Pt states she is able to have many more days of sustained energy and motivation however does fatigue after several days; pt reports decreased energy is not accompanied by depression or lose of desire to remain social or engage in activities. Denies impulsive or risky behavior. Pt reports sleep has improved. Feels rested, falls asleep well and remains asleep throughout the evening. Denies nightmares. Sleep appx 8 hours per night     Denies passive SI, no plan or intent. Denies HI AVH; no delusions noted    Patient reports she has discontinued Lexapro. Patient denies medication side effects. At today's visit, patient denies thoughts of harm to self or others since last appointment, and denies auditory or visual hallucinations.        Appetite (since last visit):   [x] Normal/Unchanged  [] Increased  [] Decreased      Sleep (since last visit):   [x] Normal/Unchanged  [] Increased  [] Decreased      Energy:    [x] Normal/Unchanged  [] Increased  [] Decreased        SI [] Present  [x] Absent    HI  []Present  [x] Absent     Aggression:  [] yes  [x] no    Patient is [x] Able to contract for

## 2023-07-21 DIAGNOSIS — R07.2 PRECORDIAL PAIN: ICD-10-CM

## 2023-07-21 DIAGNOSIS — R06.02 SHORTNESS OF BREATH: Primary | ICD-10-CM

## 2023-07-21 NOTE — TELEPHONE ENCOUNTER
Spoke to the imaging center who states still incorrect - they gave us corrected information to put in chart. Order completed.

## 2023-07-22 ENCOUNTER — TRANSCRIBE ORDERS (OUTPATIENT)
Dept: ADMINISTRATIVE | Age: 73
End: 2023-07-22

## 2023-07-22 DIAGNOSIS — R06.02 SHORTNESS OF BREATH: Primary | ICD-10-CM

## 2023-07-22 DIAGNOSIS — R07.2 PRECORDIAL PAIN: ICD-10-CM

## 2023-07-27 ENCOUNTER — HOSPITAL ENCOUNTER (OUTPATIENT)
Dept: NUCLEAR MEDICINE | Age: 73
Discharge: HOME | End: 2023-07-29
Payer: COMMERCIAL

## 2023-07-27 ENCOUNTER — HOSPITAL ENCOUNTER (OUTPATIENT)
Age: 73
Discharge: HOME | End: 2023-07-29
Payer: COMMERCIAL

## 2023-07-27 DIAGNOSIS — R07.2 PRECORDIAL PAIN: ICD-10-CM

## 2023-07-27 DIAGNOSIS — R06.02 SHORTNESS OF BREATH: ICD-10-CM

## 2023-07-27 LAB
NUC STRESS EJECTION FRACTION: 57 %
STRESS BASELINE DIAS BP: 79 MMHG
STRESS BASELINE HR: 62 BPM
STRESS BASELINE ST DEPRESSION: 0 MM
STRESS BASELINE SYS BP: 188 MMHG
STRESS ESTIMATED WORKLOAD: 1 METS
STRESS PEAK DIAS BP: 79 MMHG
STRESS PEAK SYS BP: 188 MMHG
STRESS PERCENT HR ACHIEVED: 56 %
STRESS POST PEAK HR: 82 BPM
STRESS RATE PRESSURE PRODUCT: NORMAL BPM*MMHG
STRESS ST DEPRESSION: 0 MM
STRESS TARGET HR: 147 BPM
TID: 1.16

## 2023-07-27 PROCEDURE — 93018 CV STRESS TEST I&R ONLY: CPT | Performed by: INTERNAL MEDICINE

## 2023-07-27 PROCEDURE — 78452 HT MUSCLE IMAGE SPECT MULT: CPT

## 2023-07-27 PROCEDURE — 2580000003 HC RX 258: Performed by: FAMILY MEDICINE

## 2023-07-27 PROCEDURE — 3430000000 HC RX DIAGNOSTIC RADIOPHARMACEUTICAL: Performed by: FAMILY MEDICINE

## 2023-07-27 PROCEDURE — A9502 TC99M TETROFOSMIN: HCPCS | Performed by: FAMILY MEDICINE

## 2023-07-27 PROCEDURE — 93017 CV STRESS TEST TRACING ONLY: CPT

## 2023-07-27 PROCEDURE — 6360000002 HC RX W HCPCS: Performed by: FAMILY MEDICINE

## 2023-07-27 RX ORDER — REGADENOSON 0.08 MG/ML
0.4 INJECTION, SOLUTION INTRAVENOUS
Status: COMPLETED | OUTPATIENT
Start: 2023-07-27 | End: 2023-07-27

## 2023-07-27 RX ORDER — SODIUM CHLORIDE 0.9 % (FLUSH) 0.9 %
10 SYRINGE (ML) INJECTION PRN
Status: DISCONTINUED | OUTPATIENT
Start: 2023-07-27 | End: 2023-07-30 | Stop reason: HOSPADM

## 2023-07-27 RX ADMIN — SODIUM CHLORIDE, PRESERVATIVE FREE 10 ML: 5 INJECTION INTRAVENOUS at 11:37

## 2023-07-27 RX ADMIN — SODIUM CHLORIDE, PRESERVATIVE FREE 10 ML: 5 INJECTION INTRAVENOUS at 10:06

## 2023-07-27 RX ADMIN — SODIUM CHLORIDE, PRESERVATIVE FREE 10 ML: 5 INJECTION INTRAVENOUS at 11:38

## 2023-07-27 RX ADMIN — TETROFOSMIN 11.4 MILLICURIE: 1.38 INJECTION, POWDER, LYOPHILIZED, FOR SOLUTION INTRAVENOUS at 10:06

## 2023-07-27 RX ADMIN — TETROFOSMIN 35.3 MILLICURIE: 1.38 INJECTION, POWDER, LYOPHILIZED, FOR SOLUTION INTRAVENOUS at 11:37

## 2023-07-27 RX ADMIN — REGADENOSON 0.4 MG: 0.08 INJECTION, SOLUTION INTRAVENOUS at 11:37

## 2023-08-02 ENCOUNTER — OFFICE VISIT (OUTPATIENT)
Dept: FAMILY MEDICINE CLINIC | Age: 73
End: 2023-08-02
Payer: COMMERCIAL

## 2023-08-02 VITALS
DIASTOLIC BLOOD PRESSURE: 76 MMHG | TEMPERATURE: 98.6 F | BODY MASS INDEX: 30.59 KG/M2 | HEART RATE: 78 BPM | SYSTOLIC BLOOD PRESSURE: 132 MMHG | WEIGHT: 155.8 LBS | OXYGEN SATURATION: 98 % | HEIGHT: 60 IN

## 2023-08-02 DIAGNOSIS — R94.39 ABNORMAL CARDIOVASCULAR STRESS TEST: Primary | ICD-10-CM

## 2023-08-02 DIAGNOSIS — K30 INDIGESTION: ICD-10-CM

## 2023-08-02 DIAGNOSIS — Z12.11 COLON CANCER SCREENING: ICD-10-CM

## 2023-08-02 DIAGNOSIS — Z12.31 SCREENING MAMMOGRAM FOR BREAST CANCER: ICD-10-CM

## 2023-08-02 DIAGNOSIS — R07.2 PRECORDIAL PAIN: ICD-10-CM

## 2023-08-02 DIAGNOSIS — Z12.31 BREAST CANCER SCREENING BY MAMMOGRAM: ICD-10-CM

## 2023-08-02 DIAGNOSIS — E03.8 OTHER SPECIFIED HYPOTHYROIDISM: ICD-10-CM

## 2023-08-02 PROCEDURE — 99214 OFFICE O/P EST MOD 30 MIN: CPT | Performed by: FAMILY MEDICINE

## 2023-08-02 PROCEDURE — 1123F ACP DISCUSS/DSCN MKR DOCD: CPT | Performed by: FAMILY MEDICINE

## 2023-08-02 RX ORDER — LEVOTHYROXINE SODIUM 0.05 MG/1
50 TABLET ORAL DAILY
Qty: 30 TABLET | Refills: 5 | Status: SHIPPED | OUTPATIENT
Start: 2023-08-02

## 2023-08-02 RX ORDER — OMEPRAZOLE 40 MG/1
40 CAPSULE, DELAYED RELEASE ORAL DAILY
Qty: 30 CAPSULE | Refills: 5 | Status: SHIPPED | OUTPATIENT
Start: 2023-08-02

## 2023-08-02 ASSESSMENT — ENCOUNTER SYMPTOMS
PHOTOPHOBIA: 0
ABDOMINAL DISTENTION: 0
ABDOMINAL PAIN: 0
SHORTNESS OF BREATH: 0
CHEST TIGHTNESS: 0

## 2023-08-02 NOTE — PATIENT INSTRUCTIONS
Patient's testing first nuclear stress test has a questionable result with a possible small cardiac defect for perfusion so she is being referred to cardiology to discuss the matter further. Most recent TSH in June was normal refill levothyroxine for the next 6 months. Patient gets indigestion intermittent intermittently and has been given omeprazole for as needed usage. All other labs are up-to-date.

## 2023-08-07 ENCOUNTER — OFFICE VISIT (OUTPATIENT)
Dept: FAMILY MEDICINE CLINIC | Age: 73
End: 2023-08-07
Payer: COMMERCIAL

## 2023-08-07 VITALS
BODY MASS INDEX: 30.82 KG/M2 | WEIGHT: 157 LBS | OXYGEN SATURATION: 97 % | SYSTOLIC BLOOD PRESSURE: 122 MMHG | DIASTOLIC BLOOD PRESSURE: 70 MMHG | RESPIRATION RATE: 14 BRPM | HEART RATE: 67 BPM | HEIGHT: 60 IN | TEMPERATURE: 97.6 F

## 2023-08-07 DIAGNOSIS — J40 BRONCHITIS: Primary | ICD-10-CM

## 2023-08-07 DIAGNOSIS — R09.89 CHEST CONGESTION: ICD-10-CM

## 2023-08-07 LAB
Lab: NORMAL
PERFORMING INSTRUMENT: NORMAL
QC PASS/FAIL: NORMAL
SARS-COV-2, POC: NORMAL

## 2023-08-07 PROCEDURE — 87426 SARSCOV CORONAVIRUS AG IA: CPT | Performed by: NURSE PRACTITIONER

## 2023-08-07 PROCEDURE — 1123F ACP DISCUSS/DSCN MKR DOCD: CPT | Performed by: NURSE PRACTITIONER

## 2023-08-07 PROCEDURE — 99213 OFFICE O/P EST LOW 20 MIN: CPT | Performed by: NURSE PRACTITIONER

## 2023-08-07 RX ORDER — BENZONATATE 200 MG/1
200 CAPSULE ORAL 3 TIMES DAILY PRN
Qty: 30 CAPSULE | Refills: 0 | Status: SHIPPED | OUTPATIENT
Start: 2023-08-07 | End: 2023-08-17

## 2023-08-07 RX ORDER — AMOXICILLIN 875 MG/1
875 TABLET, COATED ORAL 2 TIMES DAILY
Qty: 20 TABLET | Refills: 0 | Status: SHIPPED | OUTPATIENT
Start: 2023-08-07 | End: 2023-08-17

## 2023-08-07 RX ORDER — PREDNISONE 10 MG/1
TABLET ORAL
Qty: 30 TABLET | Refills: 0 | Status: SHIPPED | OUTPATIENT
Start: 2023-08-07

## 2023-08-07 ASSESSMENT — ENCOUNTER SYMPTOMS
DIARRHEA: 1
SHORTNESS OF BREATH: 1
SORE THROAT: 0
NAUSEA: 1
COUGH: 1
WHEEZING: 1
RHINORRHEA: 0
VOMITING: 0

## 2023-08-07 NOTE — PROGRESS NOTES
Subjective:      Patient ID: Colby Knowles is a 67 y.o. female who presents today for:  Chief Complaint   Patient presents with    Cough     With phlegm SOB diarrhea   x 5 days         Cough  Associated symptoms include shortness of breath and wheezing. Pertinent negatives include no chills, fever, headaches, myalgias, rash, rhinorrhea or sore throat. Sick for 5 days   Coughing up green phlegm   Very thick   Waking up choking on it in the middle of the night   Taking mucinex and vitamins and cough syrup and drops   Having SOB   No asthma or COPD   Diarrhea         History reviewed. No pertinent past medical history. Past Surgical History:   Procedure Laterality Date    CHOLECYSTECTOMY      HYSTERECTOMY (CERVIX STATUS UNKNOWN)       Social History     Socioeconomic History    Marital status:      Spouse name: Not on file    Number of children: Not on file    Years of education: Not on file    Highest education level: Not on file   Occupational History    Not on file   Tobacco Use    Smoking status: Former     Packs/day: 0.50     Years: 10.00     Pack years: 5.00     Types: Cigarettes     Quit date: 0     Years since quittin.6    Smokeless tobacco: Never   Substance and Sexual Activity    Alcohol use: Yes     Comment: social    Drug use: Never    Sexual activity: Not on file   Other Topics Concern    Not on file   Social History Narrative    Not on file     Social Determinants of Health     Financial Resource Strain: Low Risk     Difficulty of Paying Living Expenses: Not hard at all   Food Insecurity: No Food Insecurity    Worried About [place] in the Last Year: Never true    [address] in the Last Year: Never true   Transportation Needs: Unknown    Lack of Transportation (Medical): Not on file    Lack of Transportation (Non-Medical):  No   Physical Activity: Not on file   Stress: Not on file   Social Connections: Not on file   Intimate Partner Violence: Not on file   Housing

## 2023-08-17 ENCOUNTER — OFFICE VISIT (OUTPATIENT)
Dept: FAMILY MEDICINE CLINIC | Age: 73
End: 2023-08-17
Payer: COMMERCIAL

## 2023-08-17 VITALS
TEMPERATURE: 97.6 F | BODY MASS INDEX: 30.82 KG/M2 | SYSTOLIC BLOOD PRESSURE: 126 MMHG | HEART RATE: 90 BPM | WEIGHT: 157 LBS | OXYGEN SATURATION: 98 % | HEIGHT: 60 IN | DIASTOLIC BLOOD PRESSURE: 86 MMHG

## 2023-08-17 DIAGNOSIS — R05.1 ACUTE COUGH: Primary | ICD-10-CM

## 2023-08-17 DIAGNOSIS — J40 BRONCHITIS: ICD-10-CM

## 2023-08-17 DIAGNOSIS — N76.0 ACUTE VAGINITIS: ICD-10-CM

## 2023-08-17 LAB
BILIRUBIN, POC: NORMAL
BLOOD URINE, POC: NORMAL
CLARITY, POC: CLEAR
COLOR, POC: NORMAL
GLUCOSE URINE, POC: NORMAL
KETONES, POC: NORMAL
LEUKOCYTE EST, POC: NORMAL
NITRITE, POC: NORMAL
PH, POC: 6
PROTEIN, POC: NORMAL
SPECIFIC GRAVITY, POC: 1.03
UROBILINOGEN, POC: NORMAL

## 2023-08-17 PROCEDURE — 1123F ACP DISCUSS/DSCN MKR DOCD: CPT | Performed by: NURSE PRACTITIONER

## 2023-08-17 PROCEDURE — 81003 URINALYSIS AUTO W/O SCOPE: CPT | Performed by: NURSE PRACTITIONER

## 2023-08-17 PROCEDURE — 99213 OFFICE O/P EST LOW 20 MIN: CPT | Performed by: NURSE PRACTITIONER

## 2023-08-17 RX ORDER — ALBUTEROL SULFATE 90 UG/1
2 AEROSOL, METERED RESPIRATORY (INHALATION) 4 TIMES DAILY PRN
Qty: 18 G | Refills: 0 | Status: SHIPPED | OUTPATIENT
Start: 2023-08-17

## 2023-08-17 RX ORDER — FLUCONAZOLE 150 MG/1
150 TABLET ORAL ONCE
Qty: 1 TABLET | Refills: 0 | Status: SHIPPED | OUTPATIENT
Start: 2023-08-17 | End: 2023-08-17

## 2023-08-17 RX ORDER — AZITHROMYCIN 250 MG/1
250 TABLET, FILM COATED ORAL SEE ADMIN INSTRUCTIONS
Qty: 6 TABLET | Refills: 0 | Status: SHIPPED | OUTPATIENT
Start: 2023-08-17 | End: 2023-08-22

## 2023-08-17 RX ORDER — PREDNISONE 20 MG/1
20 TABLET ORAL 2 TIMES DAILY
Qty: 10 TABLET | Refills: 0 | Status: SHIPPED | OUTPATIENT
Start: 2023-08-17 | End: 2023-08-22

## 2023-08-17 ASSESSMENT — ENCOUNTER SYMPTOMS
SHORTNESS OF BREATH: 1
COUGH: 1
ABDOMINAL PAIN: 0
VOMITING: 0
DIARRHEA: 0
NAUSEA: 0
TROUBLE SWALLOWING: 0
SINUS PAIN: 0
VOICE CHANGE: 0
FACIAL SWELLING: 0
SINUS PRESSURE: 0
WHEEZING: 1
CHEST TIGHTNESS: 1
RHINORRHEA: 0
SORE THROAT: 0

## 2023-08-17 NOTE — PROGRESS NOTES
Subjective:      Patient ID: Shaheed Garcia is a 67 y.o. female who presents today for:  Chief Complaint   Patient presents with    Congestion     Patient reports she has bronchitis and was treated for it. Did a round of medication but still having sx & now has a bladdar infection/yeast infection       HPI  Patient is here with c/o continued cough and chest congestion for the last 2 weeks. Says she feels SOB. Says she is still wheezing, worse in the am.   Says she has colored mucus. She was seen in walk in and given ANTB, tessalon, and steroids. Says she is not better. Says she finished all the medication. Says she is still coughing / o the day. She is having no fever chills  Says she has not smoker and she has no underlying     Says she has no vaginal irritation or discharge. Says she has burning with urination as well. No past medical history on file. Past Surgical History:   Procedure Laterality Date    CHOLECYSTECTOMY      HYSTERECTOMY (CERVIX STATUS UNKNOWN)       Social History     Socioeconomic History    Marital status:       Spouse name: Not on file    Number of children: Not on file    Years of education: Not on file    Highest education level: Not on file   Occupational History    Not on file   Tobacco Use    Smoking status: Former     Packs/day: 0.50     Years: 10.00     Pack years: 5.00     Types: Cigarettes     Quit date: 0     Years since quittin.6    Smokeless tobacco: Never   Substance and Sexual Activity    Alcohol use: Yes     Comment: social    Drug use: Never    Sexual activity: Not on file   Other Topics Concern    Not on file   Social History Narrative    Not on file     Social Determinants of Health     Financial Resource Strain: Low Risk     Difficulty of Paying Living Expenses: Not hard at all   Food Insecurity: No Food Insecurity    Worried About [place] in the Last Year: Never true    [address] in the Last Year: Never true   Transportation

## 2023-08-25 ENCOUNTER — HOSPITAL ENCOUNTER (OUTPATIENT)
Dept: WOMENS IMAGING | Age: 73
End: 2023-08-25
Payer: COMMERCIAL

## 2023-08-25 ENCOUNTER — OFFICE VISIT (OUTPATIENT)
Dept: CARDIOLOGY CLINIC | Age: 73
End: 2023-08-25
Payer: COMMERCIAL

## 2023-08-25 VITALS
SYSTOLIC BLOOD PRESSURE: 151 MMHG | HEART RATE: 71 BPM | OXYGEN SATURATION: 96 % | BODY MASS INDEX: 31.49 KG/M2 | WEIGHT: 160.4 LBS | DIASTOLIC BLOOD PRESSURE: 86 MMHG | HEIGHT: 60 IN

## 2023-08-25 DIAGNOSIS — R07.2 PRECORDIAL PAIN: ICD-10-CM

## 2023-08-25 DIAGNOSIS — E78.2 MIXED HYPERLIPIDEMIA: ICD-10-CM

## 2023-08-25 DIAGNOSIS — Z12.31 SCREENING MAMMOGRAM FOR BREAST CANCER: ICD-10-CM

## 2023-08-25 DIAGNOSIS — R07.9 CHEST PAIN, UNSPECIFIED TYPE: Primary | ICD-10-CM

## 2023-08-25 DIAGNOSIS — R94.39 EQUIVOCAL STRESS TEST: Primary | ICD-10-CM

## 2023-08-25 DIAGNOSIS — R94.39 CARDIOVASCULAR STRESS TEST ABNORMAL: ICD-10-CM

## 2023-08-25 PROCEDURE — 1123F ACP DISCUSS/DSCN MKR DOCD: CPT | Performed by: INTERNAL MEDICINE

## 2023-08-25 PROCEDURE — 77063 BREAST TOMOSYNTHESIS BI: CPT

## 2023-08-25 PROCEDURE — 99204 OFFICE O/P NEW MOD 45 MIN: CPT | Performed by: INTERNAL MEDICINE

## 2023-08-25 NOTE — PROGRESS NOTES
CC; abn stress test      23: Patient presents for initial medical evaluation. Patient is followed on a regular basis by Dr. Sofía Ortega MD. no prior history of diabetes hypertension or hyperlipidemia. No smoking. Patient complains of chest pain, shortness of breath as well as fatigue. She underwent treadmill stress EKG in 2023 which was submaximal, reached 81% of my heart rate without evidence of ischemia. Status post nuclear stress test which was inconclusive due to motion artifact/a adjacent bowel artifact cannot exclude ischemia. No prior history of cardiac disease            Patient Active Problem List   Diagnosis    Iron deficiency anemia, unspecified    Anemia of chronic renal failure    Encephalopathy, unspecified    Isolated myocarditis    Muscle weakness (generalized)    Myopathy, unspecified    Other symptoms and signs involving cognitive functions and awareness    Unspecified visual disturbance    Obesity with body mass index 30 or greater    Recurrent major depression (HCC)    Moderate bipolar II disorder, most recent episode major depressive ([address])    Insomnia       Past Surgical History:   Procedure Laterality Date    CHOLECYSTECTOMY      HYSTERECTOMY (CERVIX STATUS UNKNOWN)      OVARY REMOVAL         Social History     Socioeconomic History    Marital status:     Tobacco Use    Smoking status: Former     Packs/day: 0.50     Years: 10.00     Pack years: 5.00     Types: Cigarettes     Quit date:      Years since quittin.6    Smokeless tobacco: Never   Substance and Sexual Activity    Alcohol use: Yes     Comment: social    Drug use: Never     Social Determinants of Health     Financial Resource Strain: Low Risk     Difficulty of Paying Living Expenses: Not hard at all   Food Insecurity: No Food Insecurity    Worried About Running Out of Food in the Last Year: Never true    Ran Out of Food in the Last Year: Never true   Transportation Needs: Unknown    Lack of

## 2023-08-28 DIAGNOSIS — E78.2 MIXED HYPERLIPIDEMIA: Primary | ICD-10-CM

## 2023-08-28 RX ORDER — ATENOLOL 100 MG/1
TABLET ORAL
Qty: 1 TABLET | Refills: 0 | Status: SHIPPED | OUTPATIENT
Start: 2023-08-28

## 2023-08-28 NOTE — TELEPHONE ENCOUNTER
----- Message from [facility] sent at 8/25/2023  1:03 PM EDT -----  Regarding: Need ATENOLOL and BMP  Need ATENOLOL and BMP    PRIOR TO CCTA TESTING PLEASE [facility] PENDED ORDER

## 2023-08-30 ENCOUNTER — OFFICE VISIT (OUTPATIENT)
Dept: FAMILY MEDICINE CLINIC | Age: 73
End: 2023-08-30
Payer: COMMERCIAL

## 2023-08-30 VITALS
OXYGEN SATURATION: 98 % | BODY MASS INDEX: 31.22 KG/M2 | WEIGHT: 159 LBS | DIASTOLIC BLOOD PRESSURE: 88 MMHG | TEMPERATURE: 98.8 F | SYSTOLIC BLOOD PRESSURE: 154 MMHG | HEIGHT: 60 IN | HEART RATE: 70 BPM

## 2023-08-30 DIAGNOSIS — R06.02 SOB (SHORTNESS OF BREATH): Primary | ICD-10-CM

## 2023-08-30 DIAGNOSIS — R06.02 SOB (SHORTNESS OF BREATH): ICD-10-CM

## 2023-08-30 DIAGNOSIS — R06.2 WHEEZING: ICD-10-CM

## 2023-08-30 PROCEDURE — 94640 AIRWAY INHALATION TREATMENT: CPT | Performed by: FAMILY MEDICINE

## 2023-08-30 PROCEDURE — 1123F ACP DISCUSS/DSCN MKR DOCD: CPT | Performed by: FAMILY MEDICINE

## 2023-08-30 PROCEDURE — 96372 THER/PROPH/DIAG INJ SC/IM: CPT | Performed by: FAMILY MEDICINE

## 2023-08-30 PROCEDURE — 99215 OFFICE O/P EST HI 40 MIN: CPT | Performed by: FAMILY MEDICINE

## 2023-08-30 RX ORDER — ALBUTEROL SULFATE 2.5 MG/3ML
2.5 SOLUTION RESPIRATORY (INHALATION) ONCE
Status: COMPLETED | OUTPATIENT
Start: 2023-08-30 | End: 2023-08-30

## 2023-08-30 RX ORDER — IPRATROPIUM BROMIDE AND ALBUTEROL SULFATE 2.5; .5 MG/3ML; MG/3ML
1 SOLUTION RESPIRATORY (INHALATION) ONCE
Status: COMPLETED | OUTPATIENT
Start: 2023-08-30 | End: 2023-08-30

## 2023-08-30 RX ORDER — TRIAMCINOLONE ACETONIDE 40 MG/ML
80 INJECTION, SUSPENSION INTRA-ARTICULAR; INTRAMUSCULAR ONCE
Status: COMPLETED | OUTPATIENT
Start: 2023-08-30 | End: 2023-08-30

## 2023-08-30 RX ORDER — TRIAMCINOLONE ACETONIDE 40 MG/ML
80 INJECTION, SUSPENSION INTRA-ARTICULAR; INTRAMUSCULAR ONCE
Qty: 2 ML | Refills: 0
Start: 2023-08-30 | End: 2023-08-30 | Stop reason: CLARIF

## 2023-08-30 RX ADMIN — ALBUTEROL SULFATE 2.5 MG: 2.5 SOLUTION RESPIRATORY (INHALATION) at 12:30

## 2023-08-30 RX ADMIN — TRIAMCINOLONE ACETONIDE 80 MG: 40 INJECTION, SUSPENSION INTRA-ARTICULAR; INTRAMUSCULAR at 13:26

## 2023-08-30 RX ADMIN — IPRATROPIUM BROMIDE AND ALBUTEROL SULFATE 1 DOSE: 2.5; .5 SOLUTION RESPIRATORY (INHALATION) at 11:57

## 2023-08-30 ASSESSMENT — ENCOUNTER SYMPTOMS
CONSTIPATION: 0
NAUSEA: 0
DIARRHEA: 0
SINUS PRESSURE: 1
WHEEZING: 0
CHEST TIGHTNESS: 0
VOMITING: 0
PHOTOPHOBIA: 0
SHORTNESS OF BREATH: 1
COUGH: 1
ABDOMINAL PAIN: 0
ABDOMINAL DISTENTION: 0

## 2023-08-30 NOTE — PATIENT INSTRUCTIONS
Patient has been advised on how to use the nebulizer to as a spacer for her inhaler. She will use 2 puffs every 4 hours until she is seen again tomorrow. She has been given injection of steroid to help with inflammation.

## 2023-08-30 NOTE — PROGRESS NOTES
Diagnosis Orders   1. SOB (shortness of breath)  ipratropium 0.5 mg-albuterol 2.5 mg (DUONEB) nebulizer solution 1 Dose    CA PRESSURIZED/NONPRESSURIZED INHALATION TREATMENT    albuterol (PROVENTIL) (2.5 MG/3ML) 0.083% nebulizer solution 2.5 mg    triamcinolone acetonide (KENALOG) injection 80 mg    Culture, Respiratory    triamcinolone acetonide (KENALOG-40) injection 80 mg    DISCONTINUED: triamcinolone acetonide (KENALOG) 40 MG/ML injection      2. Wheezing  albuterol (PROVENTIL) (2.5 MG/3ML) 0.083% nebulizer solution 2.5 mg    triamcinolone acetonide (KENALOG) injection 80 mg    Culture, Respiratory    triamcinolone acetonide (KENALOG-40) injection 80 mg    DISCONTINUED: triamcinolone acetonide (KENALOG) 40 MG/ML injection        Return in about 1 day (around 8/31/2023) for for review of outcome of today's recommendation. Patient Instructions   Patient has been advised on how to use the nebulizer to as a spacer for her inhaler. She will use 2 puffs every 4 hours until she is seen again tomorrow. She has been given injection of steroid to help with inflammation. Subjective:      Patient ID: Timo Blackburn is a 67 y.o. female who presents for:  Chief Complaint   Patient presents with    Cough     Productive x 1 month        About a month patient has had symptoms. See below review of systems and failed antibiotics x2. No sick contacts.       Current Outpatient Medications on File Prior to Visit   Medication Sig Dispense Refill    atenolol (TENORMIN) 100 MG tablet TAKE 2 HOURS PRIOR TO CARDIAC CT TESTING 1 tablet 0    albuterol sulfate HFA (VENTOLIN HFA) 108 (90 Base) MCG/ACT inhaler Inhale 2 puffs into the lungs 4 times daily as needed for Wheezing 18 g 0    omeprazole (PRILOSEC) 40 MG delayed release capsule Take 1 capsule by mouth daily 30 capsule 5    levothyroxine (SYNTHROID) 50 MCG tablet Take 1 tablet by mouth daily 30 tablet 5    Lumateperone Tosylate (CAPLYTA) 42 MG capsule Take 1 capsule by

## 2023-08-30 NOTE — TELEPHONE ENCOUNTER
Pt daughter requesting for us to fax lab results once completed to [facility]. Pt will doing labs tomorrow.      [employer]   Fax # [phone]

## 2023-08-30 NOTE — PROGRESS NOTES
After obtaining consent, and per orders of Dr. Xiomy Ellington, injection of kenalog given in Right upper quad. gluteus by Mihai Liu MA. Patient instructed to remain in clinic for 20 minutes afterwards, and to report any adverse reaction to me immediately.

## 2023-08-31 ENCOUNTER — TELEPHONE (OUTPATIENT)
Dept: FAMILY MEDICINE CLINIC | Age: 73
End: 2023-08-31

## 2023-08-31 ENCOUNTER — OFFICE VISIT (OUTPATIENT)
Dept: FAMILY MEDICINE CLINIC | Age: 73
End: 2023-08-31
Payer: COMMERCIAL

## 2023-08-31 VITALS
OXYGEN SATURATION: 97 % | HEART RATE: 81 BPM | WEIGHT: 159 LBS | BODY MASS INDEX: 31.22 KG/M2 | HEIGHT: 60 IN | TEMPERATURE: 98.7 F

## 2023-08-31 DIAGNOSIS — E03.8 OTHER SPECIFIED HYPOTHYROIDISM: ICD-10-CM

## 2023-08-31 DIAGNOSIS — R07.9 CHEST PAIN, UNSPECIFIED TYPE: ICD-10-CM

## 2023-08-31 DIAGNOSIS — J40 BRONCHITIS: ICD-10-CM

## 2023-08-31 DIAGNOSIS — R94.39 CARDIOVASCULAR STRESS TEST ABNORMAL: ICD-10-CM

## 2023-08-31 LAB
ANION GAP SERPL CALCULATED.3IONS-SCNC: 12 MEQ/L (ref 9–15)
BACTERIA SPEC RESP CULT: NORMAL
BUN SERPL-MCNC: 11 MG/DL (ref 8–23)
CALCIUM SERPL-MCNC: 9.2 MG/DL (ref 8.5–9.9)
CHLORIDE SERPL-SCNC: 105 MEQ/L (ref 95–107)
CO2 SERPL-SCNC: 23 MEQ/L (ref 20–31)
CREAT SERPL-MCNC: 0.63 MG/DL (ref 0.5–0.9)
GLUCOSE SERPL-MCNC: 95 MG/DL (ref 70–99)
POTASSIUM SERPL-SCNC: 4.3 MEQ/L (ref 3.4–4.9)
SODIUM SERPL-SCNC: 140 MEQ/L (ref 135–144)

## 2023-08-31 PROCEDURE — 99213 OFFICE O/P EST LOW 20 MIN: CPT | Performed by: FAMILY MEDICINE

## 2023-08-31 PROCEDURE — 1123F ACP DISCUSS/DSCN MKR DOCD: CPT | Performed by: FAMILY MEDICINE

## 2023-08-31 RX ORDER — LEVOTHYROXINE SODIUM 0.05 MG/1
50 TABLET ORAL DAILY
Qty: 30 TABLET | Refills: 5 | Status: SHIPPED | OUTPATIENT
Start: 2023-08-31

## 2023-08-31 RX ORDER — ALBUTEROL SULFATE 90 UG/1
2 AEROSOL, METERED RESPIRATORY (INHALATION) 4 TIMES DAILY PRN
Qty: 18 G | Refills: 0 | Status: SHIPPED | OUTPATIENT
Start: 2023-08-31

## 2023-08-31 ASSESSMENT — ENCOUNTER SYMPTOMS
SHORTNESS OF BREATH: 1
PHOTOPHOBIA: 0
CHEST TIGHTNESS: 0
ABDOMINAL PAIN: 0
COUGH: 1
WHEEZING: 0
ABDOMINAL DISTENTION: 0

## 2023-08-31 NOTE — PATIENT INSTRUCTIONS
This utilize her albuterol inhaler 2 puffs at a time every 6 hours today and then spaced out to every 8 hours tomorrow. She will continue every 8 hours as needed and eventually wean off inhaler completely.     Follow-up appointment 1 to 2 weeks to recheck

## 2023-08-31 NOTE — PROGRESS NOTES
inhaler 2 puffs at a time every 6 hours today and then spaced out to every 8 hours tomorrow. She will continue every 8 hours as needed and eventually wean off inhaler completely. Follow-up appointment 1 to 2 weeks to recheck    This note was partially created with the assistance of dictation. This may lead to grammatical or spelling errors. Gian Townsend M.D.

## 2023-09-01 DIAGNOSIS — J40 BRONCHITIS: Primary | ICD-10-CM

## 2023-09-01 DIAGNOSIS — J40 BRONCHITIS: ICD-10-CM

## 2023-09-01 NOTE — TELEPHONE ENCOUNTER
Spoke to micro who states that new sample may be provided due to to many epithelial cell in the specimen. Spoke to pt daughter aware pt needs to have sample recollected.

## 2023-09-04 LAB — BACTERIA SPEC RESP CULT: NORMAL

## 2023-09-08 ENCOUNTER — OFFICE VISIT (OUTPATIENT)
Dept: BEHAVIORAL/MENTAL HEALTH CLINIC | Age: 73
End: 2023-09-08

## 2023-09-08 VITALS
SYSTOLIC BLOOD PRESSURE: 122 MMHG | HEART RATE: 88 BPM | BODY MASS INDEX: 30.47 KG/M2 | WEIGHT: 156 LBS | DIASTOLIC BLOOD PRESSURE: 72 MMHG

## 2023-09-08 DIAGNOSIS — G47.00 INSOMNIA, UNSPECIFIED TYPE: ICD-10-CM

## 2023-09-08 DIAGNOSIS — F31.81 MODERATE BIPOLAR II DISORDER, MOST RECENT EPISODE MAJOR DEPRESSIVE (HCC): Primary | ICD-10-CM

## 2023-09-08 RX ORDER — TRAZODONE HYDROCHLORIDE 50 MG/1
TABLET ORAL
Qty: 90 TABLET | Refills: 1 | Status: SHIPPED | OUTPATIENT
Start: 2023-09-08

## 2023-09-08 NOTE — PROGRESS NOTES
Allergic/Immunologic      MEDICATIONS:    Current Outpatient Medications:     traZODone (DESYREL) 50 MG tablet, Take 1 tablet by mouth at bedtime for sleep, Disp: 90 tablet, Rfl: 1    Lumateperone Tosylate (CAPLYTA) 42 MG capsule, Take 1 capsule by mouth daily, Disp: 30 capsule, Rfl: 2    albuterol sulfate HFA (VENTOLIN HFA) 108 (90 Base) MCG/ACT inhaler, Inhale 2 puffs into the lungs 4 times daily as needed for Wheezing, Disp: 18 g, Rfl: 0    levothyroxine (SYNTHROID) 50 MCG tablet, Take 1 tablet by mouth daily, Disp: 30 tablet, Rfl: 5    atenolol (TENORMIN) 100 MG tablet, TAKE 2 HOURS PRIOR TO CARDIAC CT TESTING, Disp: 1 tablet, Rfl: 0    omeprazole (PRILOSEC) 40 MG delayed release capsule, Take 1 capsule by mouth daily, Disp: 30 capsule, Rfl: 5    Examination:    Vitals:   Vitals:    09/08/23 1334   BP: 122/72   Pulse: 88       Wt Readings from Last 3 Encounters:   09/08/23 156 lb (70.8 kg)   08/31/23 159 lb (72.1 kg)   08/30/23 159 lb (72.1 kg)       Labs:   No results found for: AMMONIA, VALP, LITHIUM, CBC, BMP, CMP, LIPIDPAN, TSH, HCG, UDS       Assessment:     Mental Status Examination:      Level of consciousness:  alert and oriented to person, place, and situation  Appearance:  well-appearing grooming and good hygiene  Behavior/Motor:  no abnormalities noted  Attitude toward examiner:  attentive and good eye contact  Speech:  spontaneous, normal rate and normal volume   Mood: \"sad\", appears euthymic    Affect:  mood congruent  Thought processes:  linear and logical  Thought content: Denies suicidal, denies plan or intent.   Denies  homicidal ideation, denies auditory or visual hallucinations  Cognition:  no deficits in attention, concentration notable, recent memory grossly intact  Concentration intact  Memory intact  Insight good   Judgement fair   Fund of Knowledge adequate    Patient symptoms are:   [x] Well controlled  [] Improving  [] Worsening  [] No change      Pt is marsiol for safety and denies

## 2023-09-15 ENCOUNTER — TELEPHONE (OUTPATIENT)
Dept: CARDIOLOGY CLINIC | Age: 73
End: 2023-09-15

## 2023-09-15 NOTE — TELEPHONE ENCOUNTER
New order faxed to [address]: [facility] Radiology Dept. For Cardiac CT with correct ICD10 code.

## 2023-09-15 NOTE — TELEPHONE ENCOUNTER
Patient is at Radiology to have procedure-the ICD 10 code is incorrect-it needs to be R07. 9-please fax over new order (fax: [phone])-ADALBERTO is there waiting

## 2023-09-28 ENCOUNTER — OFFICE VISIT (OUTPATIENT)
Dept: CARDIOLOGY CLINIC | Age: 73
End: 2023-09-28
Payer: COMMERCIAL

## 2023-09-28 VITALS
OXYGEN SATURATION: 97 % | SYSTOLIC BLOOD PRESSURE: 120 MMHG | HEART RATE: 90 BPM | DIASTOLIC BLOOD PRESSURE: 62 MMHG | BODY MASS INDEX: 30.86 KG/M2 | WEIGHT: 158 LBS

## 2023-09-28 DIAGNOSIS — Z71.2 ENCOUNTER TO DISCUSS TEST RESULTS: Primary | ICD-10-CM

## 2023-09-28 DIAGNOSIS — R93.1 ABNORMAL COMPUTED TOMOGRAPHY ANGIOGRAPHY OF HEART: ICD-10-CM

## 2023-09-28 DIAGNOSIS — R53.83 OTHER FATIGUE: ICD-10-CM

## 2023-09-28 PROCEDURE — 1123F ACP DISCUSS/DSCN MKR DOCD: CPT | Performed by: NURSE PRACTITIONER

## 2023-09-28 PROCEDURE — 99213 OFFICE O/P EST LOW 20 MIN: CPT | Performed by: NURSE PRACTITIONER

## 2023-09-28 RX ORDER — NITROGLYCERIN 0.4 MG/1
0.4 TABLET SUBLINGUAL EVERY 5 MIN PRN
OUTPATIENT
Start: 2023-09-28

## 2023-09-28 RX ORDER — DIPHENHYDRAMINE HCL 25 MG
50 TABLET ORAL ONCE
OUTPATIENT
Start: 2023-09-28 | End: 2023-09-28

## 2023-09-28 RX ORDER — PREDNISONE 5 MG/1
50 TABLET ORAL ONCE
OUTPATIENT
Start: 2023-09-28 | End: 2023-09-28

## 2023-09-28 RX ORDER — ONDANSETRON 2 MG/ML
4 INJECTION INTRAMUSCULAR; INTRAVENOUS EVERY 6 HOURS PRN
OUTPATIENT
Start: 2023-09-28

## 2023-09-28 RX ORDER — ASPIRIN 81 MG/1
81 TABLET ORAL ONCE
OUTPATIENT
Start: 2023-09-28 | End: 2023-09-28

## 2023-09-28 RX ORDER — SODIUM CHLORIDE 0.9 % (FLUSH) 0.9 %
5-40 SYRINGE (ML) INJECTION PRN
OUTPATIENT
Start: 2023-09-28

## 2023-09-28 RX ORDER — SODIUM CHLORIDE 9 MG/ML
INJECTION, SOLUTION INTRAVENOUS PRN
OUTPATIENT
Start: 2023-09-28

## 2023-09-28 RX ORDER — SODIUM CHLORIDE 0.9 % (FLUSH) 0.9 %
5-40 SYRINGE (ML) INJECTION EVERY 12 HOURS SCHEDULED
OUTPATIENT
Start: 2023-09-28

## 2023-09-28 NOTE — PROGRESS NOTES
CC; abn stress test      8-25-23: Patient presents for initial medical evaluation. Patient is followed on a regular basis by Dr. Karma Up MD. no prior history of diabetes hypertension or hyperlipidemia. No smoking. Patient complains of chest pain, shortness of breath as well as fatigue. She underwent treadmill stress EKG in June 2023 which was submaximal, reached 81% of my heart rate without evidence of ischemia. Status post nuclear stress test which was inconclusive due to motion artifact/a adjacent bowel artifact cannot exclude ischemia. No prior history of cardiac disease    9/28/23: pt seen in office today to discuss cardiac CTA results. Results from 9/16/23 shows:  1. Significant stenosis of the proximal/mid dominant right coronary   artery with noncalcified plaque. Mid FFR<0.50 (hemodynamically   significant). 2. Mild-moderate diffuse coronary artery disease of the remaining coronary arteries without significant stenosis. 3. No hemodynamic significant stenosis of the remaining major coronary arteries by FFR assessment. 4. Coronary artery calcium score 140, 70th percentile for age, gender, and race in asymptomatic patients. Pt denies any episodes of chest pain. However, she reports she has been feeling very tired, fatigued, no energy as well as mild SOB with exertion. Discussed need for LHC with possible PCI with pt and her son at length. They are in agreement to move forward with procedure.          Patient Active Problem List   Diagnosis    Iron deficiency anemia, unspecified    Anemia of chronic renal failure    Encephalopathy, unspecified    Isolated myocarditis    Muscle weakness (generalized)    Myopathy, unspecified    Other symptoms and signs involving cognitive functions and awareness    Unspecified visual disturbance    Obesity with body mass index 30 or greater    Recurrent major depression (HCC)    Moderate bipolar II disorder, most recent episode major depressive ([address])

## 2023-10-03 DIAGNOSIS — R93.1 ABNORMAL COMPUTED TOMOGRAPHY ANGIOGRAPHY OF HEART: Primary | ICD-10-CM

## 2023-10-03 DIAGNOSIS — R07.9 CHEST PAIN, UNSPECIFIED TYPE: ICD-10-CM

## 2023-10-03 PROBLEM — R94.39 ABNORMAL STRESS TEST: Status: ACTIVE | Noted: 2023-10-03

## 2023-10-04 ENCOUNTER — TELEPHONE (OUTPATIENT)
Dept: CARDIOLOGY CLINIC | Age: 73
End: 2023-10-04

## 2023-10-04 NOTE — TELEPHONE ENCOUNTER
Pt's son Sneha Nelson aware of procedure instructions : 10/13/23 3p, arrive 1p, NPO 9a the morning of procedure.

## 2023-10-13 ENCOUNTER — HOSPITAL ENCOUNTER (OUTPATIENT)
Age: 73
Setting detail: OUTPATIENT SURGERY
Discharge: HOME | End: 2023-10-13
Attending: INTERNAL MEDICINE | Admitting: INTERNAL MEDICINE
Payer: COMMERCIAL

## 2023-10-13 VITALS
RESPIRATION RATE: 17 BRPM | SYSTOLIC BLOOD PRESSURE: 138 MMHG | HEART RATE: 77 BPM | OXYGEN SATURATION: 100 % | TEMPERATURE: 97.9 F | BODY MASS INDEX: 30.27 KG/M2 | DIASTOLIC BLOOD PRESSURE: 54 MMHG | WEIGHT: 155 LBS

## 2023-10-13 DIAGNOSIS — R94.39 ABNORMAL STRESS TEST: ICD-10-CM

## 2023-10-13 DIAGNOSIS — R07.9 CHEST PAIN: ICD-10-CM

## 2023-10-13 LAB
ANION GAP SERPL CALCULATED.3IONS-SCNC: 10 MEQ/L (ref 9–15)
BUN SERPL-MCNC: 6 MG/DL (ref 8–23)
CALCIUM SERPL-MCNC: 8.6 MG/DL (ref 8.5–9.9)
CHLORIDE SERPL-SCNC: 98 MEQ/L (ref 95–107)
CO2 SERPL-SCNC: 23 MEQ/L (ref 20–31)
CREAT SERPL-MCNC: 0.49 MG/DL (ref 0.5–0.9)
ERYTHROCYTE [DISTWIDTH] IN BLOOD BY AUTOMATED COUNT: 13.4 % (ref 11.5–14.5)
GLUCOSE SERPL-MCNC: 95 MG/DL (ref 70–99)
HCT VFR BLD AUTO: 40.4 % (ref 37–47)
HGB BLD-MCNC: 13.5 G/DL (ref 12–16)
MCH RBC QN AUTO: 31.3 PG (ref 27–31.3)
MCHC RBC AUTO-ENTMCNC: 33.4 % (ref 33–37)
MCV RBC AUTO: 93.7 FL (ref 79.4–94.8)
PLATELET # BLD AUTO: 234 K/UL (ref 130–400)
POTASSIUM SERPL-SCNC: 3.4 MEQ/L (ref 3.4–4.9)
RBC # BLD AUTO: 4.31 M/UL (ref 4.2–5.4)
SODIUM SERPL-SCNC: 131 MEQ/L (ref 135–144)
WBC # BLD AUTO: 8.3 K/UL (ref 4.8–10.8)

## 2023-10-13 PROCEDURE — 6370000000 HC RX 637 (ALT 250 FOR IP): Performed by: INTERNAL MEDICINE

## 2023-10-13 PROCEDURE — 93458 L HRT ARTERY/VENTRICLE ANGIO: CPT | Performed by: INTERNAL MEDICINE

## 2023-10-13 PROCEDURE — 80048 BASIC METABOLIC PNL TOTAL CA: CPT

## 2023-10-13 PROCEDURE — 2709999900 HC NON-CHARGEABLE SUPPLY: Performed by: INTERNAL MEDICINE

## 2023-10-13 PROCEDURE — C9600 PERC DRUG-EL COR STENT SING: HCPCS | Performed by: INTERNAL MEDICINE

## 2023-10-13 PROCEDURE — C1894 INTRO/SHEATH, NON-LASER: HCPCS | Performed by: INTERNAL MEDICINE

## 2023-10-13 PROCEDURE — C1769 GUIDE WIRE: HCPCS | Performed by: INTERNAL MEDICINE

## 2023-10-13 PROCEDURE — 92928 PRQ TCAT PLMT NTRAC ST 1 LES: CPT | Performed by: INTERNAL MEDICINE

## 2023-10-13 PROCEDURE — 2500000003 HC RX 250 WO HCPCS: Performed by: INTERNAL MEDICINE

## 2023-10-13 PROCEDURE — 6360000004 HC RX CONTRAST MEDICATION: Performed by: INTERNAL MEDICINE

## 2023-10-13 PROCEDURE — 99152 MOD SED SAME PHYS/QHP 5/>YRS: CPT | Performed by: INTERNAL MEDICINE

## 2023-10-13 PROCEDURE — 99153 MOD SED SAME PHYS/QHP EA: CPT | Performed by: INTERNAL MEDICINE

## 2023-10-13 PROCEDURE — 7100000011 HC PHASE II RECOVERY - ADDTL 15 MIN: Performed by: INTERNAL MEDICINE

## 2023-10-13 PROCEDURE — C1887 CATHETER, GUIDING: HCPCS | Performed by: INTERNAL MEDICINE

## 2023-10-13 PROCEDURE — C1725 CATH, TRANSLUMIN NON-LASER: HCPCS | Performed by: INTERNAL MEDICINE

## 2023-10-13 PROCEDURE — 6360000002 HC RX W HCPCS: Performed by: INTERNAL MEDICINE

## 2023-10-13 PROCEDURE — 7100000010 HC PHASE II RECOVERY - FIRST 15 MIN: Performed by: INTERNAL MEDICINE

## 2023-10-13 PROCEDURE — 85027 COMPLETE CBC AUTOMATED: CPT

## 2023-10-13 PROCEDURE — C1874 STENT, COATED/COV W/DEL SYS: HCPCS | Performed by: INTERNAL MEDICINE

## 2023-10-13 DEVICE — STENT ONYXNG35034UX ONYX 3.50X34RX
Type: IMPLANTABLE DEVICE | Status: FUNCTIONAL
Brand: ONYX FRONTIER™

## 2023-10-13 DEVICE — STENT ONYXNG35015UX ONYX 3.50X15RX
Type: IMPLANTABLE DEVICE | Status: FUNCTIONAL
Brand: ONYX FRONTIER™

## 2023-10-13 RX ORDER — SODIUM CHLORIDE 0.9 % (FLUSH) 0.9 %
5-40 SYRINGE (ML) INJECTION PRN
Status: ACTIVE | OUTPATIENT
Start: 2023-10-13

## 2023-10-13 RX ORDER — NITROGLYCERIN 20 MG/100ML
INJECTION INTRAVENOUS PRN
Status: DISCONTINUED | OUTPATIENT
Start: 2023-10-13 | End: 2023-10-13 | Stop reason: HOSPADM

## 2023-10-13 RX ORDER — NITROGLYCERIN 0.4 MG/1
0.4 TABLET SUBLINGUAL EVERY 5 MIN PRN
Status: ACTIVE | OUTPATIENT
Start: 2023-10-13

## 2023-10-13 RX ORDER — ACETAMINOPHEN 325 MG/1
650 TABLET ORAL EVERY 4 HOURS PRN
Status: ACTIVE | OUTPATIENT
Start: 2023-10-13

## 2023-10-13 RX ORDER — ASPIRIN 81 MG/1
81 TABLET ORAL ONCE
Status: ACTIVE | OUTPATIENT
Start: 2023-10-13

## 2023-10-13 RX ORDER — ASPIRIN 81 MG/1
TABLET, CHEWABLE ORAL PRN
Status: DISCONTINUED | OUTPATIENT
Start: 2023-10-13 | End: 2023-10-13 | Stop reason: HOSPADM

## 2023-10-13 RX ORDER — ATORVASTATIN CALCIUM 40 MG/1
40 TABLET, FILM COATED ORAL DAILY
Qty: 30 TABLET | Refills: 3 | Status: SHIPPED | OUTPATIENT
Start: 2023-10-13

## 2023-10-13 RX ORDER — MORPHINE SULFATE 2 MG/ML
2 INJECTION, SOLUTION INTRAMUSCULAR; INTRAVENOUS
Status: ACTIVE | OUTPATIENT
Start: 2023-10-13 | End: 2023-10-14

## 2023-10-13 RX ORDER — HEPARIN SODIUM 1000 [USP'U]/ML
INJECTION, SOLUTION INTRAVENOUS; SUBCUTANEOUS PRN
Status: DISCONTINUED | OUTPATIENT
Start: 2023-10-13 | End: 2023-10-13 | Stop reason: HOSPADM

## 2023-10-13 RX ORDER — SODIUM CHLORIDE 9 MG/ML
INJECTION, SOLUTION INTRAVENOUS PRN
Status: ACTIVE | OUTPATIENT
Start: 2023-10-13

## 2023-10-13 RX ORDER — NITROGLYCERIN 0.4 MG/1
0.4 TABLET SUBLINGUAL EVERY 5 MIN PRN
Qty: 25 TABLET | Refills: 3 | Status: SHIPPED | OUTPATIENT
Start: 2023-10-13

## 2023-10-13 RX ORDER — NICARDIPINE HYDROCHLORIDE 2.5 MG/ML
INJECTION INTRAVENOUS PRN
Status: DISCONTINUED | OUTPATIENT
Start: 2023-10-13 | End: 2023-10-13 | Stop reason: HOSPADM

## 2023-10-13 RX ORDER — FENTANYL CITRATE 50 UG/ML
INJECTION, SOLUTION INTRAMUSCULAR; INTRAVENOUS PRN
Status: DISCONTINUED | OUTPATIENT
Start: 2023-10-13 | End: 2023-10-13 | Stop reason: HOSPADM

## 2023-10-13 RX ORDER — SODIUM CHLORIDE 9 MG/ML
INJECTION, SOLUTION INTRAVENOUS CONTINUOUS
Status: ACTIVE | OUTPATIENT
Start: 2023-10-13

## 2023-10-13 RX ORDER — FENTANYL CITRATE 0.05 MG/ML
25 INJECTION, SOLUTION INTRAMUSCULAR; INTRAVENOUS
Status: ACTIVE | OUTPATIENT
Start: 2023-10-13 | End: 2023-10-14

## 2023-10-13 RX ORDER — DIPHENHYDRAMINE HCL 25 MG
50 TABLET ORAL ONCE
Status: ACTIVE | OUTPATIENT
Start: 2023-10-13

## 2023-10-13 RX ORDER — SODIUM CHLORIDE 0.9 % (FLUSH) 0.9 %
5-40 SYRINGE (ML) INJECTION EVERY 12 HOURS SCHEDULED
Status: DISPENSED | OUTPATIENT
Start: 2023-10-13

## 2023-10-13 RX ORDER — ONDANSETRON 2 MG/ML
4 INJECTION INTRAMUSCULAR; INTRAVENOUS EVERY 6 HOURS PRN
Status: ACTIVE | OUTPATIENT
Start: 2023-10-13

## 2023-10-13 RX ORDER — NITROGLYCERIN 20 MG/100ML
INJECTION INTRAVENOUS CONTINUOUS PRN
Status: DISCONTINUED | OUTPATIENT
Start: 2023-10-13 | End: 2023-10-13 | Stop reason: HOSPADM

## 2023-10-13 RX ORDER — SODIUM CHLORIDE 9 MG/ML
INJECTION, SOLUTION INTRAVENOUS PRN
Status: DISPENSED | OUTPATIENT
Start: 2023-10-13

## 2023-10-13 RX ORDER — ASPIRIN 81 MG/1
81 TABLET ORAL DAILY
Qty: 30 TABLET | Refills: 3 | COMMUNITY
Start: 2023-10-13

## 2023-10-13 RX ORDER — MIDAZOLAM HYDROCHLORIDE 2 MG/2ML
2 INJECTION, SOLUTION INTRAMUSCULAR; INTRAVENOUS
Status: ACTIVE | OUTPATIENT
Start: 2023-10-13 | End: 2023-10-14

## 2023-10-13 RX ORDER — HYDRALAZINE HYDROCHLORIDE 20 MG/ML
10 INJECTION INTRAMUSCULAR; INTRAVENOUS EVERY 10 MIN PRN
Status: ACTIVE | OUTPATIENT
Start: 2023-10-13

## 2023-10-13 RX ORDER — LABETALOL HYDROCHLORIDE 5 MG/ML
10 INJECTION, SOLUTION INTRAVENOUS EVERY 30 MIN PRN
Status: ACTIVE | OUTPATIENT
Start: 2023-10-13

## 2023-10-13 RX ORDER — MIDAZOLAM HYDROCHLORIDE 1 MG/ML
INJECTION INTRAMUSCULAR; INTRAVENOUS PRN
Status: DISCONTINUED | OUTPATIENT
Start: 2023-10-13 | End: 2023-10-13 | Stop reason: HOSPADM

## 2023-10-13 RX ORDER — PREDNISONE 50 MG/1
50 TABLET ORAL ONCE
Status: ACTIVE | OUTPATIENT
Start: 2023-10-13

## 2023-10-13 NOTE — BRIEF OP NOTE
Section of Cardiology  Adult Brief Cardiac Cath Procedure Note        Procedure(s):  LHC, b/l coronary angio, PCI of prox to mid RCA with DESx3    Pre-operative Diagnosis:  abn cardiac CTA      H&P Status: Completed and reviewed. Post-operative Diagnosis:      LV EF of 60%  LM normal  LAD mod diffuse disease. 50-60% in mid  CX mod diffuse disease. 50-60% mid  RCA 99% mid.  Large caliber,     Findings:  See full report    Complications:  none    Primary Proceduralist:   Dr.Wes Bae DO    Plan    DAPT  RFM  Full procedure note to follow

## 2023-10-13 NOTE — PROGRESS NOTES
Returned from cath lab to pre/post cath, alert and oriented. Vasc band to R wrist intact, no bleeding or hematoma. R wrist is bruised. VSS. Resting comfortably. 1645: Began removing air from vasc band, no bleeding or hematoma. 1730: All air removed from vasc band, Vasc band removed from R wrist, no bleeding or hematoma, bruising present. Tegaderm dressing applied. Vital signs remain stable. 1815:R wrist remains stable, no bleeding or hematoma. Pt up getting dressed for discharge to home. IV removed. Discharge instructions reviewed with patient, patient's daughter and son. All new prescribed medications reviewed with patient and family, verbalized understanding. Pt discharged to home. Transported to outpatient surgery exit via wheelchair. Patient's daughter will drive patient home.

## 2023-10-27 ENCOUNTER — OFFICE VISIT (OUTPATIENT)
Dept: CARDIOLOGY CLINIC | Age: 73
End: 2023-10-27
Payer: COMMERCIAL

## 2023-10-27 VITALS
OXYGEN SATURATION: 97 % | WEIGHT: 157 LBS | BODY MASS INDEX: 30.66 KG/M2 | DIASTOLIC BLOOD PRESSURE: 80 MMHG | SYSTOLIC BLOOD PRESSURE: 130 MMHG | HEART RATE: 62 BPM

## 2023-10-27 DIAGNOSIS — Z98.61 HISTORY OF PERCUTANEOUS CORONARY INTERVENTION: ICD-10-CM

## 2023-10-27 DIAGNOSIS — I25.10 CORONARY ARTERY DISEASE INVOLVING NATIVE CORONARY ARTERY OF NATIVE HEART WITHOUT ANGINA PECTORIS: ICD-10-CM

## 2023-10-27 DIAGNOSIS — E66.9 OBESITY WITH BODY MASS INDEX 30 OR GREATER: ICD-10-CM

## 2023-10-27 DIAGNOSIS — R53.83 OTHER FATIGUE: Primary | ICD-10-CM

## 2023-10-27 PROCEDURE — 99213 OFFICE O/P EST LOW 20 MIN: CPT | Performed by: INTERNAL MEDICINE

## 2023-10-27 PROCEDURE — 1123F ACP DISCUSS/DSCN MKR DOCD: CPT | Performed by: INTERNAL MEDICINE

## 2023-10-27 RX ORDER — PANTOPRAZOLE SODIUM 40 MG/1
40 TABLET, DELAYED RELEASE ORAL
Qty: 90 TABLET | Refills: 3 | Status: SHIPPED | OUTPATIENT
Start: 2023-10-27

## 2023-10-27 RX ORDER — CLOPIDOGREL BISULFATE 75 MG/1
75 TABLET ORAL DAILY
Qty: 90 TABLET | Refills: 3 | Status: SHIPPED | OUTPATIENT
Start: 2023-10-27

## 2023-10-27 NOTE — PROGRESS NOTES
No       No family history on file. No current facility-administered medications for this visit. Current Outpatient Medications   Medication Sig Dispense Refill    clopidogrel (PLAVIX) 75 MG tablet Take 1 tablet by mouth daily 90 tablet 3    pantoprazole (PROTONIX) 40 MG tablet Take 1 tablet by mouth every morning (before breakfast) 90 tablet 3    aspirin 81 MG EC tablet Take 1 tablet by mouth daily 30 tablet 3    nitroGLYCERIN (NITROSTAT) 0.4 MG SL tablet Place 1 tablet under the tongue every 5 minutes as needed for Chest pain up to max of 3 total doses.  If no relief after 1 dose, call 911. 25 tablet 3    atorvastatin (LIPITOR) 40 MG tablet Take 1 tablet by mouth daily 30 tablet 3     Facility-Administered Medications Ordered in Other Visits   Medication Dose Route Frequency Provider Last Rate Last Admin    sodium chloride flush 0.9 % injection 5-40 mL  5-40 mL IntraVENous 2 times per day Althea Bird, APRN - CNP        sodium chloride flush 0.9 % injection 5-40 mL  5-40 mL IntraVENous PRN Althea Bird APRN - CNP        0.9 % sodium chloride infusion   IntraVENous PRN Althea Bird, APRN - CNP        aspirin EC tablet 81 mg  81 mg Oral Once Althea Bird APRN - CNP        ondansetron (ZOFRAN) injection 4 mg  4 mg IntraVENous Q6H PRN Althea Bird APRN - CNP        predniSONE (DELTASONE) tablet 50 mg  50 mg Oral Once Althea Bird, APRN - CNP        diphenhydrAMINE (BENADRYL) tablet 50 mg  50 mg Oral Once Althea Bird, APRN - CNP        hydrocortisone sodium succinate PF (SOLU-CORTEF) injection 200 mg  200 mg IntraVENous Once Althea Bird APRN - CNP        nitroGLYCERIN (NITROSTAT) SL tablet 0.4 mg  0.4 mg SubLINGual Q5 Min PRN Adrianna Bird APRN - CNP        0.9 % sodium chloride infusion   IntraVENous Continuous Holiday, Anupam J, DO        sodium chloride flush 0.9 % injection 5-40 mL  5-40 mL IntraVENous 2 times per day Holiday, Anupam J, DO        sodium chloride flush 0.9 %

## 2023-11-01 ENCOUNTER — TELEPHONE (OUTPATIENT)
Dept: FAMILY MEDICINE CLINIC | Age: 73
End: 2023-11-01

## 2023-11-01 NOTE — TELEPHONE ENCOUNTER
Patients daughter states that they went to  caplyta and the cost of it went from $99.00 to $215.00. Patient is out of this medication. She is asking if you can give some samples or provide a letter so the medication will be less expensive. Patients daughter states that you told them to call and let you know if they ever had any kind of trouble getting this medication. Please Advise.     # [phone]; ok to leave a VM

## 2023-11-11 LAB — NONINV COLON CA DNA+OCC BLD SCRN STL QL: NORMAL

## 2023-11-23 LAB — NONINV COLON CA DNA+OCC BLD SCRN STL QL: NEGATIVE

## 2023-12-20 DIAGNOSIS — K21.9 GASTROESOPHAGEAL REFLUX DISEASE, UNSPECIFIED WHETHER ESOPHAGITIS PRESENT: ICD-10-CM

## 2023-12-20 DIAGNOSIS — R41.0 CONFUSION: ICD-10-CM

## 2023-12-21 LAB — BACTERIA UR CULT: NORMAL

## 2023-12-27 ENCOUNTER — TELEPHONE (OUTPATIENT)
Dept: FAMILY MEDICINE CLINIC | Age: 73
End: 2023-12-27

## 2023-12-27 NOTE — TELEPHONE ENCOUNTER
Pts daughter called into the office requesting a refill for Caplyta 42mg due to the cost of the medication. With current insurance a new Rx would be $400 vs $90 with new insurance starting in January. LVM informing Ilene that a message was going to be routed to [place].

## 2023-12-29 NOTE — TELEPHONE ENCOUNTER
28 days sample supply provided for patient. Spoke to pt's daughter (on HIPAA) to inform her. Pt's daughter stated that she is looking for other options in order to be able to get Caplya at a more affordable price. As of right now it would be $90 Jan-March.

## 2024-02-04 RX ORDER — ATORVASTATIN CALCIUM 40 MG/1
40 TABLET, FILM COATED ORAL DAILY
Qty: 90 TABLET | Refills: 3 | Status: SHIPPED | OUTPATIENT
Start: 2024-02-04

## 2024-02-05 ENCOUNTER — OFFICE VISIT (OUTPATIENT)
Dept: FAMILY MEDICINE CLINIC | Age: 74
End: 2024-02-05
Payer: COMMERCIAL

## 2024-02-05 VITALS
BODY MASS INDEX: 31.22 KG/M2 | HEIGHT: 60 IN | OXYGEN SATURATION: 98 % | HEART RATE: 70 BPM | SYSTOLIC BLOOD PRESSURE: 136 MMHG | WEIGHT: 159 LBS | DIASTOLIC BLOOD PRESSURE: 78 MMHG | TEMPERATURE: 98.2 F

## 2024-02-05 DIAGNOSIS — R82.90 ABNORMAL URINALYSIS: ICD-10-CM

## 2024-02-05 DIAGNOSIS — R30.0 DYSURIA: ICD-10-CM

## 2024-02-05 DIAGNOSIS — R31.9 HEMATURIA, UNSPECIFIED TYPE: ICD-10-CM

## 2024-02-05 DIAGNOSIS — Z00.00 INITIAL MEDICARE ANNUAL WELLNESS VISIT: Primary | ICD-10-CM

## 2024-02-05 DIAGNOSIS — N89.8 VAGINAL DISCHARGE: ICD-10-CM

## 2024-02-05 DIAGNOSIS — E87.5 HYPERKALEMIA: ICD-10-CM

## 2024-02-05 DIAGNOSIS — R41.3 MEMORY LOSS: ICD-10-CM

## 2024-02-05 DIAGNOSIS — H02.403 PTOSIS OF BOTH EYELIDS: ICD-10-CM

## 2024-02-05 DIAGNOSIS — G47.09 OTHER INSOMNIA: ICD-10-CM

## 2024-02-05 DIAGNOSIS — R53.83 OTHER FATIGUE: ICD-10-CM

## 2024-02-05 LAB
BILIRUBIN, POC: ABNORMAL
BLOOD URINE, POC: ABNORMAL
CLARITY, POC: ABNORMAL
COLOR, POC: ABNORMAL
GLUCOSE URINE, POC: ABNORMAL
KETONES, POC: ABNORMAL
LEUKOCYTE EST, POC: ABNORMAL
NITRITE, POC: ABNORMAL
PH, POC: 6
PROTEIN, POC: ABNORMAL
SPECIFIC GRAVITY, POC: 1.03
UROBILINOGEN, POC: 3.5

## 2024-02-05 PROCEDURE — 1123F ACP DISCUSS/DSCN MKR DOCD: CPT | Performed by: FAMILY MEDICINE

## 2024-02-05 PROCEDURE — 81003 URINALYSIS AUTO W/O SCOPE: CPT | Performed by: FAMILY MEDICINE

## 2024-02-05 PROCEDURE — 99215 OFFICE O/P EST HI 40 MIN: CPT | Performed by: FAMILY MEDICINE

## 2024-02-05 PROCEDURE — G0438 PPPS, INITIAL VISIT: HCPCS | Performed by: FAMILY MEDICINE

## 2024-02-05 RX ORDER — DONEPEZIL HYDROCHLORIDE 5 MG/1
5 TABLET, FILM COATED ORAL NIGHTLY
Qty: 30 TABLET | Refills: 3 | Status: SHIPPED | OUTPATIENT
Start: 2024-02-05

## 2024-02-05 SDOH — ECONOMIC STABILITY: INCOME INSECURITY: HOW HARD IS IT FOR YOU TO PAY FOR THE VERY BASICS LIKE FOOD, HOUSING, MEDICAL CARE, AND HEATING?: NOT HARD AT ALL

## 2024-02-05 SDOH — ECONOMIC STABILITY: FOOD INSECURITY: WITHIN THE PAST 12 MONTHS, THE FOOD YOU BOUGHT JUST DIDN'T LAST AND YOU DIDN'T HAVE MONEY TO GET MORE.: NEVER TRUE

## 2024-02-05 SDOH — ECONOMIC STABILITY: FOOD INSECURITY: WITHIN THE PAST 12 MONTHS, YOU WORRIED THAT YOUR FOOD WOULD RUN OUT BEFORE YOU GOT MONEY TO BUY MORE.: NEVER TRUE

## 2024-02-05 ASSESSMENT — PATIENT HEALTH QUESTIONNAIRE - PHQ9
6. FEELING BAD ABOUT YOURSELF - OR THAT YOU ARE A FAILURE OR HAVE LET YOURSELF OR YOUR FAMILY DOWN: 0
SUM OF ALL RESPONSES TO PHQ QUESTIONS 1-9: 8
SUM OF ALL RESPONSES TO PHQ QUESTIONS 1-9: 8
5. POOR APPETITE OR OVEREATING: 1
4. FEELING TIRED OR HAVING LITTLE ENERGY: 2
10. IF YOU CHECKED OFF ANY PROBLEMS, HOW DIFFICULT HAVE THESE PROBLEMS MADE IT FOR YOU TO DO YOUR WORK, TAKE CARE OF THINGS AT HOME, OR GET ALONG WITH OTHER PEOPLE: 0
3. TROUBLE FALLING OR STAYING ASLEEP: 3
7. TROUBLE CONCENTRATING ON THINGS, SUCH AS READING THE NEWSPAPER OR WATCHING TELEVISION: 0
SUM OF ALL RESPONSES TO PHQ9 QUESTIONS 1 & 2: 2
1. LITTLE INTEREST OR PLEASURE IN DOING THINGS: 1
2. FEELING DOWN, DEPRESSED OR HOPELESS: 1
SUM OF ALL RESPONSES TO PHQ QUESTIONS 1-9: 8
9. THOUGHTS THAT YOU WOULD BE BETTER OFF DEAD, OR OF HURTING YOURSELF: 0
SUM OF ALL RESPONSES TO PHQ QUESTIONS 1-9: 8
8. MOVING OR SPEAKING SO SLOWLY THAT OTHER PEOPLE COULD HAVE NOTICED. OR THE OPPOSITE, BEING SO FIGETY OR RESTLESS THAT YOU HAVE BEEN MOVING AROUND A LOT MORE THAN USUAL: 0

## 2024-02-05 NOTE — PATIENT INSTRUCTIONS
can help you talk to your doctor.  Follow-up care is a key part of your treatment and safety. Be sure to make and go to all appointments, and call your doctor if you are having problems. It's also a good idea to know your test results and keep a list of the medicines you take.  Current as of: February 26, 2023               Content Version: 13.9  © 2006-2023 [employer].   Care instructions adapted under license by [employer]. If you have questions about a medical condition or this instruction, always ask your healthcare professional. [employer] disclaims any warranty or liability for your use of this information.           Starting a Weight Loss Plan: Care Instructions  Overview     If you're thinking about losing weight, it can be hard to know where to start. Your doctor can help you set up a weight loss plan that best meets your needs. You may want to take a class on nutrition or exercise, or you could join a weight loss support group. If you have questions about how to make changes to your eating or exercise habits, ask your doctor about seeing a registered dietitian or an exercise specialist.  It can be a big challenge to lose weight. But you don't have to make huge changes at once. Make small changes, and stick with them. When those changes become habit, add a few more changes.  If you don't think you're ready to make changes right now, try to pick a date in the future. Make an appointment to see your doctor to discuss whether the time is right for you to start a plan.  Follow-up care is a key part of your treatment and safety. Be sure to make and go to all appointments, and call your doctor if you are having problems. It's also a good idea to know your test results and keep a list of the medicines you take.  How can you care for yourself at home?  Set realistic goals. Many people expect to lose much more weight than is likely. A weight loss of 5% to 10% of your body weight may be

## 2024-02-05 NOTE — PROGRESS NOTES
Medicare Annual Wellness Visit    Erica Perez is here for Medicare AWV and Urinary Tract Infection (3-4 days, pt states pain during urination. Notes that it was feeling better but then started again . )    Assessment & Plan   Initial Medicare annual wellness visit  Dysuria  -     POCT Urinalysis No Micro (Auto)  -     Culture, Urine; Future  -     Ambulatory referral to Urogynecology  Memory loss  -     donepezil (ARICEPT) 5 MG tablet; Take 1 tablet by mouth nightly, Disp-30 tablet, R-3Normal  Abnormal urinalysis  -     Ambulatory referral to Urogynecology  -     Culture, Genital; Future  Hematuria, unspecified type  -     Ambulatory referral to Urogynecology  Vaginal discharge  -     Culture, Genital; Future  Other insomnia  Other fatigue  -     TSH; Future  -     T4, Free; Future  -     T3, Free; Future  -     T3, Reverse; Future  -     Comprehensive Metabolic Panel; Future  -     CBC with Auto Differential; Future  Ptosis of both eyelids  -     TSH; Future  -     T4, Free; Future  -     T3, Free; Future  -     T3, Reverse; Future  -     Comprehensive Metabolic Panel; Future  -     CBC with Auto Differential; Future  Hyperkalemia  -     Comprehensive Metabolic Panel; Future    Recommendations for Preventive Services Due: see orders and patient instructions/AVS.  Recommended screening schedule for the next 5-10 years is provided to the patient in written form: see Patient Instructions/AVS.     Return in about 6 weeks (around 3/18/2024) for for review of outcome of today's recommendation.     Subjective   The following acute and/or chronic problems were also addressed today:  Burning with urination.  Had this back in December as well with a negative urine culture.    Family is noticing problems with balance and memory.  The balance seems to be related to her visual abnormality.  Has been evaluated for myasthenia gravis and is negative.    Reviewed laboratories to see prior abnormals along with current

## 2024-02-06 DIAGNOSIS — E87.5 HYPERKALEMIA: ICD-10-CM

## 2024-02-06 DIAGNOSIS — H02.403 PTOSIS OF BOTH EYELIDS: ICD-10-CM

## 2024-02-06 DIAGNOSIS — R82.90 ABNORMAL URINALYSIS: ICD-10-CM

## 2024-02-06 DIAGNOSIS — R53.83 OTHER FATIGUE: ICD-10-CM

## 2024-02-06 DIAGNOSIS — N89.8 VAGINAL DISCHARGE: ICD-10-CM

## 2024-02-06 LAB
ALBUMIN SERPL-MCNC: 4.4 G/DL (ref 3.5–4.6)
ALP SERPL-CCNC: 74 U/L (ref 40–130)
ALT SERPL-CCNC: 35 U/L (ref 0–33)
ANION GAP SERPL CALCULATED.3IONS-SCNC: 13 MEQ/L (ref 9–15)
AST SERPL-CCNC: 32 U/L (ref 0–35)
BASOPHILS # BLD: 0.1 K/UL (ref 0–0.2)
BASOPHILS NFR BLD: 0.7 %
BILIRUB SERPL-MCNC: 0.4 MG/DL (ref 0.2–0.7)
BUN SERPL-MCNC: 12 MG/DL (ref 8–23)
CALCIUM SERPL-MCNC: 9.3 MG/DL (ref 8.5–9.9)
CHLORIDE SERPL-SCNC: 106 MEQ/L (ref 95–107)
CO2 SERPL-SCNC: 23 MEQ/L (ref 20–31)
CREAT SERPL-MCNC: 0.62 MG/DL (ref 0.5–0.9)
EOSINOPHIL # BLD: 0.4 K/UL (ref 0–0.7)
EOSINOPHIL NFR BLD: 4.2 %
ERYTHROCYTE [DISTWIDTH] IN BLOOD BY AUTOMATED COUNT: 12.5 % (ref 11.5–14.5)
GLOBULIN SER CALC-MCNC: 3 G/DL (ref 2.3–3.5)
GLUCOSE SERPL-MCNC: 108 MG/DL (ref 70–99)
HCT VFR BLD AUTO: 43.2 % (ref 37–47)
HGB BLD-MCNC: 13.8 G/DL (ref 12–16)
LYMPHOCYTES # BLD: 1.4 K/UL (ref 1–4.8)
LYMPHOCYTES NFR BLD: 16 %
MCH RBC QN AUTO: 30.7 PG (ref 27–31.3)
MCHC RBC AUTO-ENTMCNC: 31.9 % (ref 33–37)
MCV RBC AUTO: 96 FL (ref 79.4–94.8)
MONOCYTES # BLD: 0.7 K/UL (ref 0.2–0.8)
MONOCYTES NFR BLD: 7.8 %
NEUTROPHILS # BLD: 6.2 K/UL (ref 1.4–6.5)
NEUTS SEG NFR BLD: 70.7 %
PLATELET # BLD AUTO: 246 K/UL (ref 130–400)
POTASSIUM SERPL-SCNC: 4.7 MEQ/L (ref 3.4–4.9)
PROT SERPL-MCNC: 7.4 G/DL (ref 6.3–8)
RBC # BLD AUTO: 4.5 M/UL (ref 4.2–5.4)
SODIUM SERPL-SCNC: 142 MEQ/L (ref 135–144)
T4 FREE SERPL-MCNC: 1.63 NG/DL (ref 0.84–1.68)
TSH SERPL-MCNC: 1.43 UIU/ML (ref 0.44–3.86)
WBC # BLD AUTO: 8.7 K/UL (ref 4.8–10.8)

## 2024-02-07 DIAGNOSIS — R30.0 DYSURIA: Primary | ICD-10-CM

## 2024-02-07 DIAGNOSIS — N30.01 ACUTE CYSTITIS WITH HEMATURIA: ICD-10-CM

## 2024-02-07 LAB
BACTERIA UR CULT: ABNORMAL
BACTERIA UR CULT: ABNORMAL
ORGANISM: ABNORMAL
T3 FREE: 2.55 PG/ML (ref 2.02–4.43)

## 2024-02-07 RX ORDER — NITROFURANTOIN 25; 75 MG/1; MG/1
100 CAPSULE ORAL 2 TIMES DAILY
Qty: 20 CAPSULE | Refills: 0 | Status: SHIPPED | OUTPATIENT
Start: 2024-02-07 | End: 2024-02-17

## 2024-02-08 NOTE — RESULT ENCOUNTER NOTE
Notify patient and family the remainder of the labs are pretty well normal.  Would like to have a repeat urine culture done once patient is a week post treatment for urinary tract infection.

## 2024-02-09 LAB — BACTERIA GENITAL AEROBE CULT: NORMAL

## 2024-02-09 NOTE — TELEPHONE ENCOUNTER
Please approve or deny request. Thank you!    Rx requested:  Requested Prescriptions     Pending Prescriptions Disp Refills    CAPLYTA 42 MG capsule [Pharmacy Med Name: Caplyta 42 mg capsule] 30 capsule 2     Sig: TAKE 1 CAPSULE BY MOUTH DAILY         Last Office Visit:   9/8/2023      Next Visit Date:  Future Appointments   Date Time Provider Department Center   2/19/2024 11:30 AM Valdemar Gambino MD [facility]   3/19/2024 11:30 AM Gian Spears MD [facility]   10/3/2024  3:15 PM HolAnupam trinh DO [facility]

## 2024-02-11 LAB — T3REVERSE SERPL-MCNC: 13.7 NG/DL (ref 9–27)

## 2024-02-12 RX ORDER — LUMATEPERONE 42 MG/1
42 CAPSULE ORAL DAILY
Qty: 30 CAPSULE | Refills: 2 | Status: SHIPPED | OUTPATIENT
Start: 2024-02-12

## 2024-02-15 ENCOUNTER — TELEPHONE (OUTPATIENT)
Dept: FAMILY MEDICINE CLINIC | Age: 74
End: 2024-02-15

## 2024-02-15 NOTE — TELEPHONE ENCOUNTER
I did not start the Caplyta.  Was that started at another point.  I started her on Aricept.  Please clarify

## 2024-02-15 NOTE — TELEPHONE ENCOUNTER
Patient calling stating that she is having vomiting due to her new medication for her memory loss, her depression is worse since she has started on this. Pt states that she is crying more and she thinks it is reacting to her Bipolar medication.   Wants a call back to explain what she should do please and thank you.     Pt phone is [phone]    Medication is Caplyta 42mg caps

## 2024-02-16 NOTE — TELEPHONE ENCOUNTER
donepezil (ARICEPT) 5 MG tablet [[id number]]        Pt states that  this medication is the one causing the vomiting and crying . Not the caplyta

## 2024-02-20 ENCOUNTER — APPOINTMENT (OUTPATIENT)
Dept: GENERAL RADIOLOGY | Age: 74
DRG: 153 | End: 2024-02-20
Payer: COMMERCIAL

## 2024-02-20 ENCOUNTER — OFFICE VISIT (OUTPATIENT)
Dept: FAMILY MEDICINE CLINIC | Age: 74
End: 2024-02-20
Payer: COMMERCIAL

## 2024-02-20 ENCOUNTER — HOSPITAL ENCOUNTER (INPATIENT)
Age: 74
LOS: 2 days | Discharge: HOME | DRG: 153 | End: 2024-02-22
Attending: STUDENT IN AN ORGANIZED HEALTH CARE EDUCATION/TRAINING PROGRAM | Admitting: INTERNAL MEDICINE
Payer: COMMERCIAL

## 2024-02-20 VITALS
HEART RATE: 88 BPM | DIASTOLIC BLOOD PRESSURE: 72 MMHG | SYSTOLIC BLOOD PRESSURE: 132 MMHG | BODY MASS INDEX: 30.43 KG/M2 | TEMPERATURE: 100.4 F | OXYGEN SATURATION: 96 % | HEIGHT: 60 IN | WEIGHT: 155 LBS

## 2024-02-20 DIAGNOSIS — R07.9 CHEST PAIN, UNSPECIFIED TYPE: Primary | ICD-10-CM

## 2024-02-20 DIAGNOSIS — R07.9 CHEST PAIN, UNSPECIFIED TYPE: ICD-10-CM

## 2024-02-20 DIAGNOSIS — R07.89 OTHER CHEST PAIN: ICD-10-CM

## 2024-02-20 DIAGNOSIS — J18.9 PNEUMONIA DUE TO INFECTIOUS ORGANISM, UNSPECIFIED LATERALITY, UNSPECIFIED PART OF LUNG: ICD-10-CM

## 2024-02-20 DIAGNOSIS — R53.83 OTHER FATIGUE: ICD-10-CM

## 2024-02-20 DIAGNOSIS — R06.02 SHORTNESS OF BREATH: ICD-10-CM

## 2024-02-20 DIAGNOSIS — J18.9 PNEUMONIA OF LEFT LOWER LOBE DUE TO INFECTIOUS ORGANISM: Primary | ICD-10-CM

## 2024-02-20 LAB
ALBUMIN SERPL-MCNC: 3.6 G/DL (ref 3.5–4.6)
ALP SERPL-CCNC: 60 U/L (ref 40–130)
ALT SERPL-CCNC: 51 U/L (ref 0–33)
ANION GAP SERPL CALCULATED.3IONS-SCNC: 16 MEQ/L (ref 9–15)
AST SERPL-CCNC: 58 U/L (ref 0–35)
BASOPHILS # BLD: 0 K/UL (ref 0–0.2)
BASOPHILS NFR BLD: 0.1 %
BILIRUB SERPL-MCNC: 0.6 MG/DL (ref 0.2–0.7)
BILIRUB UR QL STRIP: NEGATIVE
BNP BLD-MCNC: 654 PG/ML
BUN SERPL-MCNC: 11 MG/DL (ref 8–23)
CALCIUM SERPL-MCNC: 8.5 MG/DL (ref 8.5–9.9)
CHLORIDE SERPL-SCNC: 96 MEQ/L (ref 95–107)
CLARITY UR: CLEAR
CO2 SERPL-SCNC: 21 MEQ/L (ref 20–31)
COLOR UR: YELLOW
CREAT SERPL-MCNC: 0.48 MG/DL (ref 0.5–0.9)
EOSINOPHIL # BLD: 0.4 K/UL (ref 0–0.7)
EOSINOPHIL NFR BLD: 3.2 %
ERYTHROCYTE [DISTWIDTH] IN BLOOD BY AUTOMATED COUNT: 12.5 % (ref 11.5–14.5)
GLOBULIN SER CALC-MCNC: 3.3 G/DL (ref 2.3–3.5)
GLUCOSE SERPL-MCNC: 112 MG/DL (ref 70–99)
GLUCOSE UR STRIP-MCNC: NEGATIVE MG/DL
HCT VFR BLD AUTO: 38.5 % (ref 37–47)
HGB BLD-MCNC: 13.2 G/DL (ref 12–16)
HGB UR QL STRIP: NEGATIVE
INFLUENZA A BY PCR: NEGATIVE
INFLUENZA B BY PCR: NEGATIVE
KETONES UR STRIP-MCNC: NEGATIVE MG/DL
LACTATE BLDV-SCNC: 2.2 MMOL/L (ref 0.5–2.2)
LEUKOCYTE ESTERASE UR QL STRIP: NEGATIVE
LIPASE SERPL-CCNC: 19 U/L (ref 12–95)
LYMPHOCYTES # BLD: 0.4 K/UL (ref 1–4.8)
LYMPHOCYTES NFR BLD: 2.7 %
MAGNESIUM SERPL-MCNC: 1.9 MG/DL (ref 1.7–2.4)
MCH RBC QN AUTO: 30.8 PG (ref 27–31.3)
MCHC RBC AUTO-ENTMCNC: 34.3 % (ref 33–37)
MCV RBC AUTO: 89.7 FL (ref 79.4–94.8)
MONOCYTES # BLD: 0.6 K/UL (ref 0.2–0.8)
MONOCYTES NFR BLD: 4.6 %
NEUTROPHILS # BLD: 11.9 K/UL (ref 1.4–6.5)
NEUTS SEG NFR BLD: 89 %
NITRITE UR QL STRIP: NEGATIVE
PH UR STRIP: 6 [PH] (ref 5–9)
PLATELET # BLD AUTO: 197 K/UL (ref 130–400)
POTASSIUM SERPL-SCNC: 4.2 MEQ/L (ref 3.4–4.9)
PROCALCITONIN SERPL IA-MCNC: 0.67 NG/ML (ref 0–0.15)
PROT SERPL-MCNC: 6.9 G/DL (ref 6.3–8)
PROT UR STRIP-MCNC: NEGATIVE MG/DL
RBC # BLD AUTO: 4.29 M/UL (ref 4.2–5.4)
SARS-COV-2 RDRP RESP QL NAA+PROBE: NOT DETECTED
SODIUM SERPL-SCNC: 133 MEQ/L (ref 135–144)
SP GR UR STRIP: 1.01 (ref 1–1.03)
TROPONIN, HIGH SENSITIVITY: 38 NG/L (ref 0–19)
TROPONIN, HIGH SENSITIVITY: 40 NG/L (ref 0–19)
TROPONIN, HIGH SENSITIVITY: 41 NG/L (ref 0–19)
URINE REFLEX TO CULTURE: NORMAL
UROBILINOGEN UR STRIP-ACNC: 0.2 E.U./DL
WBC # BLD AUTO: 13.4 K/UL (ref 4.8–10.8)

## 2024-02-20 PROCEDURE — 87635 SARS-COV-2 COVID-19 AMP PRB: CPT

## 2024-02-20 PROCEDURE — 1210000000 HC MED SURG R&B

## 2024-02-20 PROCEDURE — 93005 ELECTROCARDIOGRAM TRACING: CPT | Performed by: STUDENT IN AN ORGANIZED HEALTH CARE EDUCATION/TRAINING PROGRAM

## 2024-02-20 PROCEDURE — 83880 ASSAY OF NATRIURETIC PEPTIDE: CPT

## 2024-02-20 PROCEDURE — 96361 HYDRATE IV INFUSION ADD-ON: CPT

## 2024-02-20 PROCEDURE — 6360000002 HC RX W HCPCS: Performed by: STUDENT IN AN ORGANIZED HEALTH CARE EDUCATION/TRAINING PROGRAM

## 2024-02-20 PROCEDURE — 85025 COMPLETE CBC W/AUTO DIFF WBC: CPT

## 2024-02-20 PROCEDURE — 84145 PROCALCITONIN (PCT): CPT

## 2024-02-20 PROCEDURE — 83690 ASSAY OF LIPASE: CPT

## 2024-02-20 PROCEDURE — 84484 ASSAY OF TROPONIN QUANT: CPT

## 2024-02-20 PROCEDURE — 2500000003 HC RX 250 WO HCPCS: Performed by: STUDENT IN AN ORGANIZED HEALTH CARE EDUCATION/TRAINING PROGRAM

## 2024-02-20 PROCEDURE — 80053 COMPREHEN METABOLIC PANEL: CPT

## 2024-02-20 PROCEDURE — 81003 URINALYSIS AUTO W/O SCOPE: CPT

## 2024-02-20 PROCEDURE — 1123F ACP DISCUSS/DSCN MKR DOCD: CPT | Performed by: NURSE PRACTITIONER

## 2024-02-20 PROCEDURE — 99213 OFFICE O/P EST LOW 20 MIN: CPT | Performed by: NURSE PRACTITIONER

## 2024-02-20 PROCEDURE — 2580000003 HC RX 258: Performed by: STUDENT IN AN ORGANIZED HEALTH CARE EDUCATION/TRAINING PROGRAM

## 2024-02-20 PROCEDURE — 36415 COLL VENOUS BLD VENIPUNCTURE: CPT

## 2024-02-20 PROCEDURE — 74022 RADEX COMPL AQT ABD SERIES: CPT

## 2024-02-20 PROCEDURE — A4216 STERILE WATER/SALINE, 10 ML: HCPCS | Performed by: STUDENT IN AN ORGANIZED HEALTH CARE EDUCATION/TRAINING PROGRAM

## 2024-02-20 PROCEDURE — 99285 EMERGENCY DEPT VISIT HI MDM: CPT

## 2024-02-20 PROCEDURE — 96374 THER/PROPH/DIAG INJ IV PUSH: CPT

## 2024-02-20 PROCEDURE — 87502 INFLUENZA DNA AMP PROBE: CPT

## 2024-02-20 PROCEDURE — 96375 TX/PRO/DX INJ NEW DRUG ADDON: CPT

## 2024-02-20 PROCEDURE — 83605 ASSAY OF LACTIC ACID: CPT

## 2024-02-20 PROCEDURE — 83735 ASSAY OF MAGNESIUM: CPT

## 2024-02-20 PROCEDURE — 6370000000 HC RX 637 (ALT 250 FOR IP): Performed by: STUDENT IN AN ORGANIZED HEALTH CARE EDUCATION/TRAINING PROGRAM

## 2024-02-20 RX ORDER — ONDANSETRON 4 MG/1
4 TABLET, ORALLY DISINTEGRATING ORAL EVERY 8 HOURS PRN
Status: DISCONTINUED | OUTPATIENT
Start: 2024-02-20 | End: 2024-02-22 | Stop reason: HOSPADM

## 2024-02-20 RX ORDER — ACETAMINOPHEN 650 MG/1
650 SUPPOSITORY RECTAL EVERY 6 HOURS PRN
Status: DISCONTINUED | OUTPATIENT
Start: 2024-02-20 | End: 2024-02-22 | Stop reason: HOSPADM

## 2024-02-20 RX ORDER — LEVOTHYROXINE SODIUM 0.05 MG/1
50 TABLET ORAL DAILY
Status: DISCONTINUED | OUTPATIENT
Start: 2024-02-21 | End: 2024-02-22 | Stop reason: HOSPADM

## 2024-02-20 RX ORDER — ACETAMINOPHEN 500 MG
1000 TABLET ORAL ONCE
Status: COMPLETED | OUTPATIENT
Start: 2024-02-20 | End: 2024-02-20

## 2024-02-20 RX ORDER — MAGNESIUM SULFATE IN WATER 40 MG/ML
2000 INJECTION, SOLUTION INTRAVENOUS PRN
Status: DISCONTINUED | OUTPATIENT
Start: 2024-02-20 | End: 2024-02-22 | Stop reason: HOSPADM

## 2024-02-20 RX ORDER — FAMOTIDINE 20 MG/1
20 TABLET, FILM COATED ORAL 2 TIMES DAILY
Status: DISCONTINUED | OUTPATIENT
Start: 2024-02-21 | End: 2024-02-22 | Stop reason: HOSPADM

## 2024-02-20 RX ORDER — POLYETHYLENE GLYCOL 3350 17 G/17G
17 POWDER, FOR SOLUTION ORAL DAILY PRN
Status: DISCONTINUED | OUTPATIENT
Start: 2024-02-20 | End: 2024-02-22 | Stop reason: HOSPADM

## 2024-02-20 RX ORDER — FAMOTIDINE 20 MG/1
20 TABLET, FILM COATED ORAL 2 TIMES DAILY
Qty: 60 TABLET | Refills: 0 | Status: SHIPPED | OUTPATIENT
Start: 2024-02-20

## 2024-02-20 RX ORDER — POTASSIUM CHLORIDE 20 MEQ/1
40 TABLET, EXTENDED RELEASE ORAL PRN
Status: DISCONTINUED | OUTPATIENT
Start: 2024-02-20 | End: 2024-02-22 | Stop reason: HOSPADM

## 2024-02-20 RX ORDER — SODIUM CHLORIDE 9 MG/ML
INJECTION, SOLUTION INTRAVENOUS CONTINUOUS
Status: DISCONTINUED | OUTPATIENT
Start: 2024-02-21 | End: 2024-02-21

## 2024-02-20 RX ORDER — SODIUM CHLORIDE 0.9 % (FLUSH) 0.9 %
5-40 SYRINGE (ML) INJECTION EVERY 12 HOURS SCHEDULED
Status: DISCONTINUED | OUTPATIENT
Start: 2024-02-21 | End: 2024-02-22 | Stop reason: HOSPADM

## 2024-02-20 RX ORDER — ASPIRIN 81 MG/1
81 TABLET ORAL DAILY
Status: DISCONTINUED | OUTPATIENT
Start: 2024-02-21 | End: 2024-02-22 | Stop reason: HOSPADM

## 2024-02-20 RX ORDER — ONDANSETRON 2 MG/ML
4 INJECTION INTRAMUSCULAR; INTRAVENOUS EVERY 6 HOURS PRN
Status: DISCONTINUED | OUTPATIENT
Start: 2024-02-20 | End: 2024-02-22 | Stop reason: HOSPADM

## 2024-02-20 RX ORDER — SODIUM CHLORIDE 0.9 % (FLUSH) 0.9 %
5-40 SYRINGE (ML) INJECTION PRN
Status: DISCONTINUED | OUTPATIENT
Start: 2024-02-20 | End: 2024-02-22 | Stop reason: HOSPADM

## 2024-02-20 RX ORDER — ACETAMINOPHEN 325 MG/1
650 TABLET ORAL EVERY 6 HOURS PRN
Status: DISCONTINUED | OUTPATIENT
Start: 2024-02-20 | End: 2024-02-22 | Stop reason: HOSPADM

## 2024-02-20 RX ORDER — SODIUM CHLORIDE 9 MG/ML
INJECTION, SOLUTION INTRAVENOUS PRN
Status: DISCONTINUED | OUTPATIENT
Start: 2024-02-20 | End: 2024-02-22 | Stop reason: HOSPADM

## 2024-02-20 RX ORDER — POTASSIUM CHLORIDE 7.45 MG/ML
10 INJECTION INTRAVENOUS PRN
Status: DISCONTINUED | OUTPATIENT
Start: 2024-02-20 | End: 2024-02-22 | Stop reason: HOSPADM

## 2024-02-20 RX ORDER — CLOPIDOGREL BISULFATE 75 MG/1
75 TABLET ORAL DAILY
Status: DISCONTINUED | OUTPATIENT
Start: 2024-02-21 | End: 2024-02-22 | Stop reason: HOSPADM

## 2024-02-20 RX ORDER — ENOXAPARIN SODIUM 100 MG/ML
40 INJECTION SUBCUTANEOUS DAILY
Status: DISCONTINUED | OUTPATIENT
Start: 2024-02-21 | End: 2024-02-22 | Stop reason: HOSPADM

## 2024-02-20 RX ORDER — ONDANSETRON 2 MG/ML
4 INJECTION INTRAMUSCULAR; INTRAVENOUS ONCE
Status: COMPLETED | OUTPATIENT
Start: 2024-02-20 | End: 2024-02-20

## 2024-02-20 RX ORDER — ATORVASTATIN CALCIUM 40 MG/1
40 TABLET, FILM COATED ORAL DAILY
Status: DISCONTINUED | OUTPATIENT
Start: 2024-02-21 | End: 2024-02-22 | Stop reason: HOSPADM

## 2024-02-20 RX ORDER — 0.9 % SODIUM CHLORIDE 0.9 %
1000 INTRAVENOUS SOLUTION INTRAVENOUS ONCE
Status: COMPLETED | OUTPATIENT
Start: 2024-02-20 | End: 2024-02-20

## 2024-02-20 RX ADMIN — FAMOTIDINE 20 MG: 10 INJECTION, SOLUTION INTRAVENOUS at 18:01

## 2024-02-20 RX ADMIN — DOXYCYCLINE 100 MG: 100 INJECTION, POWDER, LYOPHILIZED, FOR SOLUTION INTRAVENOUS at 23:29

## 2024-02-20 RX ADMIN — ONDANSETRON 4 MG: 2 INJECTION INTRAMUSCULAR; INTRAVENOUS at 18:02

## 2024-02-20 RX ADMIN — CEFTRIAXONE SODIUM 1000 MG: 1 INJECTION, POWDER, FOR SOLUTION INTRAMUSCULAR; INTRAVENOUS at 22:41

## 2024-02-20 RX ADMIN — ACETAMINOPHEN 1000 MG: 500 TABLET ORAL at 18:00

## 2024-02-20 RX ADMIN — SODIUM CHLORIDE 1000 ML: 9 INJECTION, SOLUTION INTRAVENOUS at 18:01

## 2024-02-20 ASSESSMENT — PAIN DESCRIPTION - DESCRIPTORS: DESCRIPTORS: PRESSURE

## 2024-02-20 ASSESSMENT — PAIN DESCRIPTION - ONSET: ONSET: ON-GOING

## 2024-02-20 ASSESSMENT — PAIN DESCRIPTION - FREQUENCY: FREQUENCY: CONTINUOUS

## 2024-02-20 ASSESSMENT — LIFESTYLE VARIABLES
HOW OFTEN DO YOU HAVE A DRINK CONTAINING ALCOHOL: NEVER
HOW MANY STANDARD DRINKS CONTAINING ALCOHOL DO YOU HAVE ON A TYPICAL DAY: PATIENT DOES NOT DRINK

## 2024-02-20 ASSESSMENT — PAIN - FUNCTIONAL ASSESSMENT: PAIN_FUNCTIONAL_ASSESSMENT: 0-10

## 2024-02-20 ASSESSMENT — PAIN DESCRIPTION - PAIN TYPE: TYPE: CHRONIC PAIN

## 2024-02-20 ASSESSMENT — PAIN SCALES - GENERAL
PAINLEVEL_OUTOF10: 0
PAINLEVEL_OUTOF10: 6

## 2024-02-20 ASSESSMENT — PAIN DESCRIPTION - LOCATION: LOCATION: BACK;CHEST

## 2024-02-20 ASSESSMENT — PAIN DESCRIPTION - ORIENTATION: ORIENTATION: ANTERIOR

## 2024-02-20 NOTE — PROGRESS NOTES
2024    Erica Perez (:  1950) is a 73 y.o. female, Established patient, here for evaluation of the following chief complaint(s):  GI Problem (With stomach pain ,  back pain starts from the top and goes to bottom )      Vitals:    24 1536   BP: 132/72   Pulse: 88   Temp: 100.4 °F (38 °C)   SpO2: 96%       SUBJECTIVE/OBJECTIVE:    Pt comes in today with complaints of chest and back pain. She thinks it is because she is having acid reflux. She denies any burning or acid reflux feelings besides having a decreased appetite. She states the pain has been going on for one week. It is gradually getting worse. She denies SOB and cough. She states the pain feels like it goes from the top of her chest down her abd and from the top of her back down to the bottom of her back. She admits to hx of having heart stents placed about 6 months ago. She repeatedly states she just does not feel good. She is accompanied by a friend/ family member. She also admits to having N/V/D at the beginning of symptoms for about 4 days and now that is gone. Pt did not want any testing for covid, flu or strep.         Review of Systems   Constitutional:  Positive for appetite change. Negative for chills, diaphoresis, fatigue and fever.   HENT:  Negative for congestion, ear pain, rhinorrhea, sinus pressure and sore throat.    Eyes:  Negative for discharge and redness.   Respiratory:  Positive for chest tightness. Negative for cough, shortness of breath and wheezing.    Cardiovascular:  Positive for chest pain. Negative for palpitations.   Gastrointestinal:  Positive for abdominal pain, diarrhea, nausea and vomiting. Negative for abdominal distention.   Genitourinary:  Negative for dysuria, frequency and pelvic pain.   Musculoskeletal:  Positive for myalgias.   Neurological: Negative.  Negative for dizziness, numbness and headaches.   Psychiatric/Behavioral: Negative.     All other systems reviewed and are negative.      Physical

## 2024-02-20 NOTE — ED PROVIDER NOTES
[facility] ED  eMERGENCY dEPARTMENT eNCOUnter      Pt Name: Erica Perez  MRN: [id number]  Birthdate 1950  Date of evaluation: 2/20/2024  Provider: Anibal Mas MD  Note Started: 2/20/24 5:20 PM EST    HISTORY OF PRESENT ILLNESS      Chief Complaint   Patient presents with    Chest Pain     Chest and back pain X1 wk starting at top and going down to stomach,        The history is provided by the Patient.  Erica Perez is a 73 y.o. female with a PMH clinically significant for  Myocarditis, HLD, CKD, CAD, Pancreatitis, Hypothyroidism, Anemia, Anxiety, MDD, Bipolar II D/o, Tobacco Smoking, and Etoh Abuse, S/P Cholecystectomy and S/P Hysterectomy presenting to the ED via PV c/o chest pain, back pain and abdominal pain ongoing over the past week in addition to generalized fatigue/weakness, shortness of breath, lightheadedness, diaphoresis and decreased appetite.  Patient states she thought that she had the flu last week.  Was nauseous and had frequent episodes of vomiting.  States that the vomiting and nausea has largely resolved.  Still having diarrhea however.  Drinking large amounts of fluids.  Also diagnosed with a urinary tract infection last week and is currently on antibiotics.  Characterizes chest pain as pressure sensation over the sternum.  Radiates into the belly and throughout the chest.  Also complaining of right-sided pressure-like back pain.  Denies hearing pain through the back connecting these.  States the pain is associated with diaphoresis and shortness of breath.  Has not really been exerting herself however.  Does feel similar to prior episode where she received stents. Has had subjective fevers and chills. States she just feels very fatigued. No energy.  Denies any associated: HA, Dizziness, Numbness, Focal Weakness, Cough, Hemoptysis, Orthopnea, PND, New or worsening BLE Edema/pain, Dysuria, Hematuria, or Difficulty urinating.  Nothing taken for relief pta.  States history  likely, negative COVID and flu at this time.  Patient with generalized flulike illness in addition to chest pain and back pain concerning for unstable angina.  Mild leukocytosis, elevated procalcitonin and fever noted at urgent care appreciated earlier today.  Did obtain lactic acid, but negative in the ED.  Also without evidence of hypotension.  Therefore did not initiate large-volume fluid resuscitation.  Also concern for possible acute decompensated heart failure given bilateral pleural effusions on chest x-ray.  Was noted to have patchy infiltrate on the left side concerning for pneumonia.  Initial troponin mildly elevated and patient was found to have acute TWI's as noted above.  Given findings, will treat for pneumonia and admit to medicine for further ACS rule out.  Does have extensive history of cardiac disease. Administered IV Abx and will plan to admit to IM. Repeat trop pending at this time.  Pt was administered   Medications   cefTRIAXone (ROCEPHIN) 1,000 mg in sodium chloride 0.9 % 50 mL IVPB (mini-bag) (has no administration in time range)   doxycycline (VIBRAMYCIN) 100 mg in sodium chloride 0.9 % 100 mL IVPB (has no administration in time range)   sodium chloride 0.9 % bolus 1,000 mL (1,000 mLs IntraVENous New Bag 2/20/24 1801)   ondansetron (ZOFRAN) injection 4 mg (4 mg IntraVENous Given 2/20/24 1802)   famotidine (PEPCID) 20 mg in sodium chloride (PF) 0.9 % 10 mL injection (20 mg IntraVENous Given 2/20/24 1801)   acetaminophen (TYLENOL) tablet 1,000 mg (1,000 mg Oral Given 2/20/24 1800)       Admit to IM pending repeat Troponin and reassessment.   Signed out to Dr. Almonte with plan as noted above.    Is this patient to be included in the SEP-1 core measure? No Exclusion criteria - the patient is NOT to be included for SEP-1 Core Measure due to: May have criteria for sepsis, but does not meet criteria for severe sepsis or septic shock      CRITICAL CARE TIME   Total CriticalCare time was 0 minutes,

## 2024-02-20 NOTE — PROGRESS NOTES
2024    Erica Perez (:  1950) is a 73 y.o. female, {New vs Established:[id number]::\"Established patient\"}, here for evaluation of the following chief complaint(s):  GI Problem (With stomach pain ,  back pain starts from the top and goes to bottom )      Vitals:    24 1536   BP: 132/72   Pulse: 88   Temp: 100.4 °F (38 °C)   SpO2: 96%       SUBJECTIVE/OBJECTIVE:    HPI    Review of Systems    Physical Exam    ASSESSMENT/PLAN:  1. Chest pain, unspecified type        Electronically signed by ENA Ramirez CNP on 24 at 4:15 PM EST

## 2024-02-21 ENCOUNTER — APPOINTMENT (OUTPATIENT)
Dept: CT IMAGING | Age: 74
DRG: 153 | End: 2024-02-21
Payer: COMMERCIAL

## 2024-02-21 LAB
ALBUMIN SERPL-MCNC: 3.1 G/DL (ref 3.5–4.6)
ALP SERPL-CCNC: 50 U/L (ref 40–130)
ALT SERPL-CCNC: 40 U/L (ref 0–33)
ANION GAP SERPL CALCULATED.3IONS-SCNC: 12 MEQ/L (ref 9–15)
AST SERPL-CCNC: 31 U/L (ref 0–35)
B PARAP IS1001 DNA NPH QL NAA+NON-PROBE: NOT DETECTED
B PERT.PT PRMT NPH QL NAA+NON-PROBE: NOT DETECTED
BASOPHILS # BLD: 0 K/UL (ref 0–0.2)
BASOPHILS NFR BLD: 0.2 %
BILIRUB SERPL-MCNC: 0.4 MG/DL (ref 0.2–0.7)
BUN SERPL-MCNC: 6 MG/DL (ref 8–23)
C PNEUM DNA NPH QL NAA+NON-PROBE: NOT DETECTED
CALCIUM SERPL-MCNC: 8 MG/DL (ref 8.5–9.9)
CHLORIDE SERPL-SCNC: 105 MEQ/L (ref 95–107)
CO2 SERPL-SCNC: 24 MEQ/L (ref 20–31)
CREAT SERPL-MCNC: 0.51 MG/DL (ref 0.5–0.9)
D DIMER PPP FEU-MCNC: 1.91 MG/L FEU (ref 0–0.5)
EKG ATRIAL RATE: 81 BPM
EKG P AXIS: 66 DEGREES
EKG P-R INTERVAL: 204 MS
EKG Q-T INTERVAL: 412 MS
EKG QRS DURATION: 136 MS
EKG QTC CALCULATION (BAZETT): 478 MS
EKG R AXIS: 97 DEGREES
EKG T AXIS: -24 DEGREES
EKG VENTRICULAR RATE: 81 BPM
EOSINOPHIL # BLD: 0.5 K/UL (ref 0–0.7)
EOSINOPHIL NFR BLD: 11 %
ERYTHROCYTE [DISTWIDTH] IN BLOOD BY AUTOMATED COUNT: 12.5 % (ref 11.5–14.5)
FLUAV RNA NPH QL NAA+NON-PROBE: NOT DETECTED
FLUBV RNA NPH QL NAA+NON-PROBE: NOT DETECTED
GLOBULIN SER CALC-MCNC: 2.5 G/DL (ref 2.3–3.5)
GLUCOSE SERPL-MCNC: 111 MG/DL (ref 70–99)
HADV DNA NPH QL NAA+NON-PROBE: NOT DETECTED
HCOV 229E RNA NPH QL NAA+NON-PROBE: NOT DETECTED
HCOV HKU1 RNA NPH QL NAA+NON-PROBE: NOT DETECTED
HCOV NL63 RNA NPH QL NAA+NON-PROBE: NOT DETECTED
HCOV OC43 RNA NPH QL NAA+NON-PROBE: NOT DETECTED
HCT VFR BLD AUTO: 34.8 % (ref 37–47)
HGB BLD-MCNC: 11.8 G/DL (ref 12–16)
HMPV RNA NPH QL NAA+NON-PROBE: NOT DETECTED
HPIV1 RNA NPH QL NAA+NON-PROBE: NOT DETECTED
HPIV2 RNA NPH QL NAA+NON-PROBE: NOT DETECTED
HPIV3 RNA NPH QL NAA+NON-PROBE: NOT DETECTED
HPIV4 RNA NPH QL NAA+NON-PROBE: NOT DETECTED
LYMPHOCYTES # BLD: 0.9 K/UL (ref 1–4.8)
LYMPHOCYTES NFR BLD: 17.6 %
M PNEUMO DNA NPH QL NAA+NON-PROBE: NOT DETECTED
MAGNESIUM SERPL-MCNC: 1.8 MG/DL (ref 1.7–2.4)
MCH RBC QN AUTO: 30.3 PG (ref 27–31.3)
MCHC RBC AUTO-ENTMCNC: 33.9 % (ref 33–37)
MCV RBC AUTO: 89.2 FL (ref 79.4–94.8)
MONOCYTES # BLD: 0.4 K/UL (ref 0.2–0.8)
MONOCYTES NFR BLD: 8.3 %
NEUTROPHILS # BLD: 3 K/UL (ref 1.4–6.5)
NEUTS SEG NFR BLD: 62.7 %
PLATELET # BLD AUTO: 186 K/UL (ref 130–400)
POTASSIUM SERPL-SCNC: 3.1 MEQ/L (ref 3.4–4.9)
PROT SERPL-MCNC: 5.6 G/DL (ref 6.3–8)
RBC # BLD AUTO: 3.9 M/UL (ref 4.2–5.4)
RSV RNA NPH QL NAA+NON-PROBE: NOT DETECTED
RV+EV RNA NPH QL NAA+NON-PROBE: NOT DETECTED
SARS-COV-2 RNA NPH QL NAA+NON-PROBE: NOT DETECTED
SODIUM SERPL-SCNC: 141 MEQ/L (ref 135–144)
WBC # BLD AUTO: 4.8 K/UL (ref 4.8–10.8)

## 2024-02-21 PROCEDURE — 80053 COMPREHEN METABOLIC PANEL: CPT

## 2024-02-21 PROCEDURE — 2580000003 HC RX 258: Performed by: INTERNAL MEDICINE

## 2024-02-21 PROCEDURE — 1210000000 HC MED SURG R&B

## 2024-02-21 PROCEDURE — APPSS45 APP SPLIT SHARED TIME 31-45 MINUTES

## 2024-02-21 PROCEDURE — 6370000000 HC RX 637 (ALT 250 FOR IP): Performed by: INTERNAL MEDICINE

## 2024-02-21 PROCEDURE — 71275 CT ANGIOGRAPHY CHEST: CPT

## 2024-02-21 PROCEDURE — 0202U NFCT DS 22 TRGT SARS-COV-2: CPT

## 2024-02-21 PROCEDURE — 85379 FIBRIN DEGRADATION QUANT: CPT

## 2024-02-21 PROCEDURE — 83735 ASSAY OF MAGNESIUM: CPT

## 2024-02-21 PROCEDURE — 99222 1ST HOSP IP/OBS MODERATE 55: CPT | Performed by: INTERNAL MEDICINE

## 2024-02-21 PROCEDURE — 36415 COLL VENOUS BLD VENIPUNCTURE: CPT

## 2024-02-21 PROCEDURE — 6360000004 HC RX CONTRAST MEDICATION: Performed by: FAMILY MEDICINE

## 2024-02-21 PROCEDURE — 85025 COMPLETE CBC W/AUTO DIFF WBC: CPT

## 2024-02-21 PROCEDURE — 97162 PT EVAL MOD COMPLEX 30 MIN: CPT

## 2024-02-21 PROCEDURE — 93010 ELECTROCARDIOGRAM REPORT: CPT | Performed by: INTERNAL MEDICINE

## 2024-02-21 PROCEDURE — 97166 OT EVAL MOD COMPLEX 45 MIN: CPT

## 2024-02-21 PROCEDURE — 6360000002 HC RX W HCPCS: Performed by: INTERNAL MEDICINE

## 2024-02-21 RX ORDER — POTASSIUM CHLORIDE 20 MEQ/1
40 TABLET, EXTENDED RELEASE ORAL ONCE
Status: COMPLETED | OUTPATIENT
Start: 2024-02-21 | End: 2024-02-21

## 2024-02-21 RX ORDER — MECOBALAMIN 5000 MCG
5 TABLET,DISINTEGRATING ORAL NIGHTLY
Status: DISCONTINUED | OUTPATIENT
Start: 2024-02-21 | End: 2024-02-22 | Stop reason: HOSPADM

## 2024-02-21 RX ADMIN — ATORVASTATIN CALCIUM 40 MG: 40 TABLET, FILM COATED ORAL at 09:05

## 2024-02-21 RX ADMIN — SODIUM CHLORIDE: 9 INJECTION, SOLUTION INTRAVENOUS at 00:44

## 2024-02-21 RX ADMIN — CLOPIDOGREL BISULFATE 75 MG: 75 TABLET ORAL at 09:05

## 2024-02-21 RX ADMIN — Medication 5 MG: at 20:42

## 2024-02-21 RX ADMIN — LEVOTHYROXINE SODIUM 50 MCG: 0.05 TABLET ORAL at 06:12

## 2024-02-21 RX ADMIN — ASPIRIN 81 MG: 81 TABLET, COATED ORAL at 09:04

## 2024-02-21 RX ADMIN — SODIUM CHLORIDE, PRESERVATIVE FREE 10 ML: 5 INJECTION INTRAVENOUS at 09:05

## 2024-02-21 RX ADMIN — FAMOTIDINE 20 MG: 20 TABLET, FILM COATED ORAL at 00:47

## 2024-02-21 RX ADMIN — FAMOTIDINE 20 MG: 20 TABLET, FILM COATED ORAL at 09:05

## 2024-02-21 RX ADMIN — AZITHROMYCIN MONOHYDRATE 500 MG: 500 INJECTION, POWDER, LYOPHILIZED, FOR SOLUTION INTRAVENOUS at 00:47

## 2024-02-21 RX ADMIN — FAMOTIDINE 20 MG: 20 TABLET, FILM COATED ORAL at 20:42

## 2024-02-21 RX ADMIN — SODIUM CHLORIDE, PRESERVATIVE FREE 10 ML: 5 INJECTION INTRAVENOUS at 20:42

## 2024-02-21 RX ADMIN — ENOXAPARIN SODIUM 40 MG: 100 INJECTION SUBCUTANEOUS at 09:04

## 2024-02-21 RX ADMIN — POTASSIUM CHLORIDE 40 MEQ: 1500 TABLET, EXTENDED RELEASE ORAL at 18:10

## 2024-02-21 RX ADMIN — IOPAMIDOL 75 ML: 755 INJECTION, SOLUTION INTRAVENOUS at 08:43

## 2024-02-21 ASSESSMENT — ENCOUNTER SYMPTOMS
VOMITING: 1
SHORTNESS OF BREATH: 0
COUGH: 0
NAUSEA: 0
SHORTNESS OF BREATH: 0
RHINORRHEA: 0
SORE THROAT: 0
SHORTNESS OF BREATH: 1
WHEEZING: 0
EYE REDNESS: 0
ALLERGIC/IMMUNOLOGIC NEGATIVE: 1
ABDOMINAL DISTENTION: 0
NAUSEA: 1
DIARRHEA: 0
COUGH: 0
SINUS PRESSURE: 0
VOMITING: 0
EYE DISCHARGE: 0
CHEST TIGHTNESS: 1
ABDOMINAL PAIN: 1
DIARRHEA: 1

## 2024-02-21 ASSESSMENT — PAIN SCALES - GENERAL
PAINLEVEL_OUTOF10: 0
PAINLEVEL_OUTOF10: 0

## 2024-02-21 NOTE — PROGRESS NOTES
Physician Progress Note      PATIENT:               CHERYL DONAHUE  [facility] #:                  [id number]  :                       1950  ADMIT DATE:       2024 5:04 PM  DISCH DATE:  RESPONDING  PROVIDER #:        Hilton JAMESON MD          QUERY TEXT:    Patient  admitted with Pneumonia. Patient noted to have WBC 13.4, lactic acid   2.2,  D dimer 1.91, procalcitonin 0.67 on admission. If possible, please   document in the progress notes and discharge summary if you are evaluating and   /or treating any of the following:    The medical record reflects the following:  Risk Factors: 73 yof, CAD  Clinical Indicators: per ED notes  pat fatigued appearing, with generalized   flulike illness in addition to chest pain and back pain concerning for   unstable angina..   CXR Atelectasis, patchy infiltrate left lower lobe with   left lower lobe pleural effusion, WBC 13.4, lactic acid 2.2,   D dimer 1.91,   procalcitonin 0.67  Treatment: Vibramycin, Rocephin,  CXR, Pulmonary consult, labs    Thank you  Ryan Andrade BSN RN [facility]   M-F 6am-2pm  Options provided:  -- Sepsis, present on admission  -- Pneumonia without Sepsis  -- Other - I will add my own diagnosis  -- Disagree - Not applicable / Not valid  -- Disagree - Clinically unable to determine / Unknown  -- Refer to Clinical Documentation Reviewer    PROVIDER RESPONSE TEXT:    This patient has pneumonia without Sepsis.    Query created by: Ryan Andrade on 2024 8:12 AM      Electronically signed by:  Hilton JAMESON MD 2024 11:51 AM

## 2024-02-21 NOTE — PLAN OF CARE
See OT evaluation for all goals and OT POC. Electronically signed by Delma Walker OTR/L on 2/21/2024 at 11:54 AM

## 2024-02-21 NOTE — PROGRESS NOTES
Physical Therapy Med Surg Initial Assessment  Facility/Department[place] [facility] [place] [facility] [place]THO [place]LE  Room: 64/[place] [address]       NAME: Erica Perez  : 1950 (73 y.o.)  MRN: [id number]  CODE STATUS: Full Code    Date of Service: 2024    Patient Diagnosis(es): Shortness of breath [R06.02]  Pneumonia, unspecified organism [J18.9]  Chest pain, unspecified type [R07.9]  Pneumonia due to infectious organism, unspecified laterality, unspecified part of lung [J18.9]   Chief Complaint   Patient presents with    Chest Pain     Chest and back pain X1 wk starting at top and going down to stomach,      Patient Active Problem List    Diagnosis Date Noted    Obesity with body mass index 30 or greater 2022    Recurrent major depression (HCC) 2022    Myopathy, unspecified 2022    Encephalopathy, unspecified 2022    Other symptoms and signs involving cognitive functions and awareness 2022    Unspecified visual disturbance 2022    Isolated myocarditis 2022    Muscle weakness (generalized) 2022    Pneumonia, unspecified organism 2024    Coronary artery disease involving native coronary artery of native heart without angina pectoris 10/27/2023    Abnormal stress test 10/03/2023    Chest pain 10/03/2023    Abnormal computed tomography angiography of heart 2023    Other fatigue 2023    Insomnia 2023    Moderate bipolar II disorder, most recent episode major depressive (HCC) 2023    Iron deficiency anemia, unspecified 2022    Anemia of chronic renal failure 2022        Past Medical History:   Diagnosis Date    Coronary artery disease involving native coronary artery of native heart without angina pectoris 10/27/2023     Past Surgical History:   Procedure Laterality Date    CARDIAC PROCEDURE N/A 10/13/2023    Left heart cath / coronary angiography possible percutaneous coronary intervention performed by Anupam Bae DO at [facility] [place]     and safety. Rec ambuation with rollator. Would benefit from f/u therapy upon DC for continued balance and agility training.  Requires PT Follow-Up: Yes       PLAN OF CARE:  Physical Therapy Plan  General Plan: 1 time a day 3-6 times a week  Current Treatment Recommendations: Strengthening, Balance training, Functional mobility training, Transfer training, Endurance training, Gait training, Stair training, Neuromuscular re-education, Manual, Return to work related activity, Safety education & training, Patient/Caregiver education & training, Modalities, Equipment evaluation, education, & procurement, Positioning    Safety Devices  Type of Devices: All fall risk precautions in place    Goals:  Long Term Goals  Long Term Goal 1: Pt to complete bed mobility with indep  Long Term Goal 2: Pt to complete transfers with indep  Long Term Goal 3: Pt to ambulate 50-150ft with LRD and indep  Long Term Goal 4: Pt to complete TUG within 13sec    [facility] (6 CLICK) BASIC MOBILITY  AM-PAC Inpatient Mobility Raw Score : 18     Therapy Time:   Individual   Time In 1024   Time Out 1037   Minutes 13           Melissa Gonzalez, PT, 02/21/24 at 12:02 PM         Definitions for assistance levels  Independent = pt does not require any physical supervision or assistance from another person for activity completion. Device may be needed.  Stand by assistance = pt requires verbal cues or instructions from another person, close to but not touching, to perform the activity  Minimal assistance= pt performs 75% or more of the activity; assistance is required to complete the activity  Moderate assistance= pt performs 50% of the activity; assistance is required to complete the activity  Maximal assistance = pt performs 25% of the activity; assistance is required to complete the activity  Dependent = pt requires total physical assistance to accomplish the task

## 2024-02-21 NOTE — CARE COORDINATION
Met with patient.  Definition of pneumonia discussed.  Causes of different types of pneumonia reviewed.  Symptoms discussed and pt does understand that they may have some or all of these symptoms.  Testing to diagnose pneumonia reviewed as well as possible treatments.  Importance of avoiding infections discussed including: taking medication as directed, washing hands, disposing of used tissues, getting the pneumonia and flu vaccines, and avoiding others who are ill.  Importance of not smoking and to avoid others who may be smoking around patient stressed.  Pneumonia Zones also reviewed. \"Green\" zone is the goal, \"Yellow\" zone means to call the doctor, and \"Red\" zone means to call the doctor ASAP or call 911.  Copies of Pneumonia booklet and Zone Pamphlet given to patient.  Offer for patient to express any concerns or questions. Pt denies having further questions at this time.

## 2024-02-21 NOTE — PROGRESS NOTES
Progress Note  Date:2024       [facility]  Patient Name:Erica Perez     YOB: 1950     Age:73 y.o.        Subjective    Subjective:  Symptoms:  She reports malaise and weakness.  No shortness of breath, cough, chest pain, headache, chest pressure, anorexia, diarrhea or anxiety.    Diet:  No nausea or vomiting.       Review of Systems   Respiratory:  Negative for cough and shortness of breath.    Cardiovascular:  Negative for chest pain.   Gastrointestinal:  Negative for anorexia, diarrhea, nausea and vomiting.   Neurological:  Positive for weakness.     Objective         Vitals Last 24 Hours:  TEMPERATURE:  Temp  Av.1 °F (37.3 °C)  Min: 98.2 °F (36.8 °C)  Max: 100.4 °F (38 °C)  RESPIRATIONS RANGE: Resp  Av.9  Min: 15  Max: 27  PULSE OXIMETRY RANGE: SpO2  Av.7 %  Min: 96 %  Max: 99 %  PULSE RANGE: Pulse  Av  Min: 62  Max: 90  BLOOD PRESSURE RANGE: Systolic (24hrs), Av , Min:132 , Max:170   ; Diastolic (24hrs), Av, Min:53, Max:85    I/O (24Hr):  No intake or output data in the 24 hours ending 24 1328  Objective:  General Appearance:  Comfortable, well-appearing and in no acute distress.    Vital signs: (most recent): Blood pressure (!) 166/63, pulse 70, temperature 98.4 °F (36.9 °C), temperature source Oral, resp. rate 18, height 1.524 m (5'), weight 70.3 kg (155 lb), SpO2 98 %.    HEENT: Normal HEENT exam.    Lungs:  There are decreased breath sounds.    Heart: S1 normal and S2 normal.    Abdomen: Abdomen is soft.  Bowel sounds are normal.   There is no epigastric area tenderness.     Pulses: Distal pulses are intact.    Neurological: Patient is alert.    Pupils:  Pupils are equal, round, and reactive to light.    Skin:  Warm.      Labs/Imaging/Diagnostics    Labs:  CBC:  Recent Labs     24  1745 24  0438   WBC 13.4* 4.8   RBC 4.29 3.90*   HGB 13.2 11.8*   HCT 38.5 34.8*   MCV 89.7 89.2   RDW 12.5 12.5    186     CHEMISTRIES:  Recent

## 2024-02-21 NOTE — ED PROVIDER NOTES
Patient signed out to me pending repeat troponin and reevaluation    Chest x-ray shows concern for infiltrate left lower lobe.  Initial troponin 38, significant delay in getting repeat troponins, finally ended up resulting at 40 and then 41.  Patient was chest pain-free    External documentation reviewed: I reviewed the CTA from 08/2022 which showed no aneurysm or PE,       patient's blood pressure at 141/72 on my exam.  My reevaluation she is feeling well, no chest pain, tells me that she has had some chills and a cough and shortness of breath concerning for pneumonia    She was started on Rocephin and doxycycline for the pneumonia next    She has been compliant with her medications including Plavix.    Consulted with hospitalist service Dr. Ulrich who accepts patient for admission for ACS rule out and pneumonia.    Sai Almonte DO  Emergency physician         Sai Almonte DO  02/20/24 [id number]

## 2024-02-21 NOTE — PROGRESS NOTES
[facility] OCCUPATIONAL THERAPY EVALUATION - ACUTE     NAME: Erica Perez  : 1950 (73 y.o.)  MRN: [id number]  CODE STATUS: Full Code  Room: W264/W264-01    Date of Service: 2024    Patient Diagnosis(es): Shortness of breath [R06.02]  Pneumonia, unspecified organism [J18.9]  Chest pain, unspecified type [R07.9]  Pneumonia due to infectious organism, unspecified laterality, unspecified part of lung [J18.9]   Patient Active Problem List    Diagnosis Date Noted    Obesity with body mass index 30 or greater 2022    Recurrent major depression (HCC) 2022    Myopathy, unspecified 2022    Encephalopathy, unspecified 2022    Other symptoms and signs involving cognitive functions and awareness 2022    Unspecified visual disturbance 2022    Isolated myocarditis 2022    Muscle weakness (generalized) 2022    Pneumonia, unspecified organism 2024    Coronary artery disease involving native coronary artery of native heart without angina pectoris 10/27/2023    Abnormal stress test 10/03/2023    Chest pain 10/03/2023    Abnormal computed tomography angiography of heart 2023    Other fatigue 2023    Insomnia 2023    Moderate bipolar II disorder, most recent episode major depressive (HCC) 2023    Iron deficiency anemia, unspecified 2022    Anemia of chronic renal failure 2022        Past Medical History:   Diagnosis Date    Coronary artery disease involving native coronary artery of native heart without angina pectoris 10/27/2023     Past Surgical History:   Procedure Laterality Date    CARDIAC PROCEDURE N/A 10/13/2023    Left heart cath / coronary angiography possible percutaneous coronary intervention performed by Anupam Bae DO at [facility] CARDIAC CATH LAB    CARDIAC PROCEDURE N/A 10/13/2023    Percutaneous coronary intervention performed by Anupam Bae DO at [facility] CARDIAC CATH LAB    CHOLECYSTECTOMY      HYSTERECTOMY (CERVIX

## 2024-02-21 NOTE — H&P
Hospitalist Group   History and Physical      CHIEF COMPLAINT: Chest pain    History of Present Illness:  73 y.o. female with a history of CAD status post stents presents with chest pain.  Pain started a week ago.  Described as pressure in the middle of the chest with no radiation.  Pain is mild and worse when taking deep breath.  Denied any cough.  Patient was having nausea, vomiting, fatigue, body ache.  However, the symptoms have improved but not the chest pain.  Denies any shortness of breath.  Had chills and feeling hot/cold but did not check her temperature.  Patient denied urinary symptoms, abdominal pain.  She has a history of stents and last was within the last 6 months.  She is on dual antiplatelet.    REVIEW OF SYSTEMS:  no fevers, chills, has cp, no sob, n/v, ha, vision/hearing changes, wt changes, hot/cold flashes, other open skin lesions, diarrhea, constipation, dysuria/hematuria unless noted in HPI. Complete ROS performed with the patient and is otherwise negative.      PMH:  Past Medical History:   Diagnosis Date    Coronary artery disease involving native coronary artery of native heart without angina pectoris 10/27/2023       Surgical History:  Past Surgical History:   Procedure Laterality Date    CARDIAC PROCEDURE N/A 10/13/2023    Left heart cath / coronary angiography possible percutaneous coronary intervention performed by Anupam Bae DO [facility]    CARDIAC PROCEDURE N/A 10/13/2023    Percutaneous coronary intervention performed by Anupam Bae DO at [facility] CARD[facility]AC CATH LAB    CHOLECYSTECTOMY      HYSTERECTOMY (CERVIX STATUS UNKNOWN)      OVARY REMOVAL         Medications Prior to Admission:    Prior to Admission medications    Medication Sig Start Date End Date Taking? Authorizing Provider   famotidine (PEPCID) 20 MG tablet Take 1 tablet by mouth 2 times daily 2/20/24   Peyton Gray APRN - CNP   CAPLYTA 42 MG capsule TAKE 1 CAPSULE BY MOUTH DAILY 2/12/24

## 2024-02-21 NOTE — FLOWSHEET NOTE
Pt has been up in the chair and back into the bed , family has been visiting . Son went home and got her medication (Caplyla) sent it to the pharmacy to be checked and scanned and then gave it to her. The rest of the medications are in the lock box in her room.Pt has been eating and drinking and watching tv. No distress noted.Electronically signed by Arlene Krishna LPN on 2/21/2024 at 4:08 PM  Pt son left for a while and said he would return in about 1 hour went to get pt food.no distress noted.Electronically signed by Arlene Krishna LPN on 2/21/2024 at 6:02 PM

## 2024-02-21 NOTE — CARE COORDINATION
Case Management Assessment  Initial Evaluation    Date/Time of Evaluation: 2/21/2024 12:06 PM  Assessment Completed by: Selena Pham RN    If patient is discharged prior to next notation, then this note serves as note for discharge by case management.    Patient Name: Erica Perez                   YOB: 1950  Diagnosis: Shortness of breath [R06.02]  Pneumonia, unspecified organism [J18.9]  Chest pain, unspecified type [R07.9]  Pneumonia due to infectious organism, unspecified laterality, unspecified part of lung [J18.9]                   Date / Time: 2/20/2024  5:04 PM    Patient Admission Status: Inpatient   Readmission Risk (Low < 19, Mod (19-27), High > 27): Readmission Risk Score: 10.7    Current PCP: Gian Spears MD  PCP verified by CM? Yes    Chart Reviewed: Yes      History Provided by: Patient  Patient Orientation: Alert and Oriented, Person, Place, Situation, Self    Patient Cognition: Alert    Hospitalization in the last 30 days (Readmission):  No    If yes, Readmission Assessment in CM Navigator will be completed.    Advance Directives:      Code Status: Full Code   Patient's Primary Decision Maker is: Named in Scanned ACP Document    Primary Decision Maker: diego hoffman - [phone]    Secondary Decision Maker: JUMA PEREZ - Gabriela - [phone]    Discharge Planning:    Patient lives with: Alone Type of Home: Apartment  Primary Care Giver: Self  Patient Support Systems include: Children   Current Financial resources:    Current community resources:    Current services prior to admission: None            Current DME:              Type of Home Care services:  None    ADLS  Prior functional level: Independent in ADLs/IADLs  Current functional level: Assistance with the following:, Shopping    PT AM-PAC: 18 /24  OT AM-PAC: 19 /24    Family can provide assistance at DC: Yes  Would you like Case Management to discuss the discharge plan with any other family  with the Discharge Plan?      Selena Pham RN  Case Management Department

## 2024-02-21 NOTE — CONSULTS
DATE OF CONSULTATION  2/21/2024    CONSULTANT  Anupam Bae DO    REQUESTING PHYSICIAN  Hilton Jefferson MD     PRIMARY CARDIOLOGIST  Dr. Bae    REASON FOR CONSULTATION  Chief Complaint   Patient presents with    Chest Pain     Chest and back pain X1 wk starting at top and going down to stomach,        Hospital Day: 1       Patient is a 73 y.o. female with PMH of CAD with 99% mid RCA stenosis s/p stents x 3 on 10/2023, tobacco abuse, ETOH abuse, HLD, who presents with a chief complaint of chest pain x 1 week. Patient reports that the pain is sharp in center of her chest and radiates to her back. Patient reports that the chest pain is constant at rest and worsens with inhalation. Denies any relieving factors. Patient is followed on a regular basis by Gian Zhu MD.     Troponins 38,40,41  D dimer 1.91  CXR shows Atelectasis, patchy infiltrate left lower lobe with left lower lobe pleural Effusion. Scarring or trace right pleural effusion. No free air.  CTA chest showing   IMPRESSION:  1. No evidence of thoracic aortic aneurysm or dissection.  No evidence for  pulmonary embolism.  2. Cardiomegaly with pulmonary edema and trace to small right and small left  pleural effusions.  Past Medical History:   Diagnosis Date    Coronary artery disease involving native coronary artery of native heart without angina pectoris 10/27/2023      Patient Active Problem List   Diagnosis    Iron deficiency anemia, unspecified    Anemia of chronic renal failure    Encephalopathy, unspecified    Isolated myocarditis    Muscle weakness (generalized)    Myopathy, unspecified    Other symptoms and signs involving cognitive functions and awareness    Unspecified visual disturbance    Obesity with body mass index 30 or greater    Recurrent major depression (HCC)    Moderate bipolar II disorder, most recent episode major depressive (HCC)    Insomnia    Abnormal computed tomography angiography of heart    Other fatigue    Abnormal  Cardiac size enlarged with coronary disease including coronary calcifications and trace pericardial effusion. Lungs/Pleura: Septal thickening and mosaicism progress in the mid and lower lungs with trace to small right and small left pleural effusions. Upper Abdomen: Limited images of the upper abdomen are unremarkable. Soft Tissues/Bones: No acute bone or soft tissue abnormality.     1. No evidence of thoracic aortic aneurysm or dissection.  No evidence for pulmonary embolism. 2. Cardiomegaly with pulmonary edema and trace to small right and small left pleural effusions.     XR ACUTE ABD SERIES CHEST 1 VW    Result Date: 2/20/2024  EXAMINATION: TWO XRAY VIEWS OF THE ABDOMEN AND SINGLE  XRAY VIEW OF THE CHEST 2/20/2024 6:04 pm COMPARISON: None. HISTORY: ORDERING SYSTEM PROVIDED HISTORY: CHAD BERUMEN TECHNOLOGIST PROVIDED HISTORY: Reason for exam:->CHAD BERUMEN What reading provider will be dictating this exam?->CRC FINDINGS: Four views are submitted. The cardiac silhouette is enlarged. Pulmonary vascular unremarkable. Right-sided trachea.  Area of atelectasis, patchy infiltrate left lower lobe. There is blunting of the right costophrenic angle which could be scarring or trace right pleural effusion.  There is a trace small left pleural effusion. Upright film shows no free air. Bowel gas seen both large small bowel.  Bowel gas seen to the rectum. There is stool scattered throughout the large bowel to the level of the rectal vault. The bowel gas pattern is nonspecific nonobstructive.     Atelectasis, patchy infiltrate left lower lobe with left lower lobe pleural effusion. Scarring or trace right pleural effusion. No free air. Nonspecific bowel gas pattern       EKG: normal sinus rhythm, PAC's noted      2D Echo 7/2022:    CONCLUSIONS:    1. The left ventricular systolic function is normal with a 60-65% estimated ejection fraction.    2. Spectral Doppler shows an impaired relaxation pattern of left ventricular diastolic filling.

## 2024-02-21 NOTE — CARE COORDINATION
Met with pt and son Jermaine at bedside to discuss discharge planning. Offered SNF and son and pt decline stating they were just hoping for [facility] to come and work with pt with therapy. Son and pt requesting female therapists if able.   When calling pt to schedule appointments please call pt and if no answer call son Jermaine who will then get a hold of pt. Son reported pt sometimes does not answer phone if she is unsure of who is calling.   Chelle with [facility] updated of pt request of female providers if able.

## 2024-02-21 NOTE — PROGRESS NOTES
[facility]  Pharmacy Home Medication Reconciliation Note      Medication reconciliation was attempted for patient Erica Perez 1950.   Admit date: 2/21/2024  Consult: No    Med Rec Status: Complete    Information provided by: MED REC HISTORY: Patient Interview, Caregiver Interview, with son after patient permission obtained, and Review of EMR    Consider this the most complete and up to date medication list.    MEDICATIONS     Prior to Admission medications    Medication Sig Start Date End Date Taking? Authorizing Provider   famotidine (PEPCID) 20 MG tablet Take 1 tablet by mouth 2 times daily 2/20/24   Peyton Gray APRN - CNP   CAPLYTA 42 MG capsule TAKE 1 CAPSULE BY MOUTH DAILY 2/12/24   Mode Garza APRN - CNP   atorvastatin (LIPITOR) 40 MG tablet TAKE 1 TABLET BY MOUTH DAILY 2/4/24   Holiday, Anupam DANIELS DO   clopidogrel (PLAVIX) 75 MG tablet Take 1 tablet by mouth daily 10/27/23   Holiday, Anupam DANIELS DO   aspirin 81 MG EC tablet Take 1 tablet by mouth daily 10/13/23   Holiday, Anupam DANIELS DO   nitroGLYCERIN (NITROSTAT) 0.4 MG SL tablet Place 1 tablet under the tongue every 5 minutes as needed for Chest pain up to max of 3 total doses. If no relief after 1 dose, call 911. 10/13/23   Holiday, Anuapm DANIELS DO   traZODone (DESYREL) 50 MG tablet Take 1 tablet by mouth at bedtime for sleep  Patient not taking: Reported on 2/20/2024 9/8/23   Mode Garza APRN - CNP   levothyroxine (SYNTHROID) 50 MCG tablet Take 1 tablet by mouth daily 8/31/23   Gian Spears MD       ALLERGIES   She has No Known Allergies.    Medications removed from home list:   none    Medications added to home list:  none    Changes were made to the following PTA medications:  none    Other notes:  Caplyta - non formulary, instructed pt/family to bring in home supply for inpatient use  Trazodone - not currently taking  Nitroglycerin SL tablets - active Rx but has not used  Aricept - new Rx from PCP this month, Pt started

## 2024-02-22 VITALS
RESPIRATION RATE: 18 BRPM | BODY MASS INDEX: 30.43 KG/M2 | OXYGEN SATURATION: 97 % | TEMPERATURE: 98.4 F | SYSTOLIC BLOOD PRESSURE: 132 MMHG | HEIGHT: 60 IN | WEIGHT: 155 LBS | HEART RATE: 60 BPM | DIASTOLIC BLOOD PRESSURE: 86 MMHG

## 2024-02-22 LAB
ALBUMIN SERPL-MCNC: 3.1 G/DL (ref 3.5–4.6)
ALP SERPL-CCNC: 46 U/L (ref 40–130)
ALT SERPL-CCNC: 35 U/L (ref 0–33)
ANION GAP SERPL CALCULATED.3IONS-SCNC: 12 MEQ/L (ref 9–15)
AST SERPL-CCNC: 24 U/L (ref 0–35)
BASOPHILS # BLD: 0 K/UL (ref 0–0.2)
BASOPHILS NFR BLD: 0.5 %
BILIRUB SERPL-MCNC: 0.3 MG/DL (ref 0.2–0.7)
BUN SERPL-MCNC: 6 MG/DL (ref 8–23)
CALCIUM SERPL-MCNC: 8.1 MG/DL (ref 8.5–9.9)
CHLORIDE SERPL-SCNC: 109 MEQ/L (ref 95–107)
CO2 SERPL-SCNC: 23 MEQ/L (ref 20–31)
CREAT SERPL-MCNC: 0.56 MG/DL (ref 0.5–0.9)
EOSINOPHIL # BLD: 0.6 K/UL (ref 0–0.7)
EOSINOPHIL NFR BLD: 11.6 %
ERYTHROCYTE [DISTWIDTH] IN BLOOD BY AUTOMATED COUNT: 12.7 % (ref 11.5–14.5)
GLOBULIN SER CALC-MCNC: 2.4 G/DL (ref 2.3–3.5)
GLUCOSE SERPL-MCNC: 101 MG/DL (ref 70–99)
HCT VFR BLD AUTO: 33.1 % (ref 37–47)
HGB BLD-MCNC: 11.1 G/DL (ref 12–16)
LYMPHOCYTES # BLD: 2 K/UL (ref 1–4.8)
LYMPHOCYTES NFR BLD: 35.4 %
MCH RBC QN AUTO: 30.2 PG (ref 27–31.3)
MCHC RBC AUTO-ENTMCNC: 33.5 % (ref 33–37)
MCV RBC AUTO: 89.9 FL (ref 79.4–94.8)
MONOCYTES # BLD: 0.6 K/UL (ref 0.2–0.8)
MONOCYTES NFR BLD: 11 %
NEUTROPHILS # BLD: 2.3 K/UL (ref 1.4–6.5)
NEUTS SEG NFR BLD: 41.3 %
PLATELET # BLD AUTO: 194 K/UL (ref 130–400)
POTASSIUM SERPL-SCNC: 3.6 MEQ/L (ref 3.4–4.9)
PROT SERPL-MCNC: 5.5 G/DL (ref 6.3–8)
RBC # BLD AUTO: 3.68 M/UL (ref 4.2–5.4)
SODIUM SERPL-SCNC: 144 MEQ/L (ref 135–144)
WBC # BLD AUTO: 5.5 K/UL (ref 4.8–10.8)

## 2024-02-22 PROCEDURE — 99232 SBSQ HOSP IP/OBS MODERATE 35: CPT | Performed by: INTERNAL MEDICINE

## 2024-02-22 PROCEDURE — 36415 COLL VENOUS BLD VENIPUNCTURE: CPT

## 2024-02-22 PROCEDURE — 6370000000 HC RX 637 (ALT 250 FOR IP): Performed by: INTERNAL MEDICINE

## 2024-02-22 PROCEDURE — 6360000002 HC RX W HCPCS: Performed by: INTERNAL MEDICINE

## 2024-02-22 PROCEDURE — 80053 COMPREHEN METABOLIC PANEL: CPT

## 2024-02-22 PROCEDURE — 2580000003 HC RX 258: Performed by: INTERNAL MEDICINE

## 2024-02-22 PROCEDURE — 85025 COMPLETE CBC W/AUTO DIFF WBC: CPT

## 2024-02-22 RX ORDER — LEVOFLOXACIN 500 MG/1
500 TABLET, FILM COATED ORAL DAILY
Qty: 5 TABLET | Refills: 0 | Status: SHIPPED | OUTPATIENT
Start: 2024-02-22 | End: 2024-02-27

## 2024-02-22 RX ADMIN — ENOXAPARIN SODIUM 40 MG: 100 INJECTION SUBCUTANEOUS at 09:16

## 2024-02-22 RX ADMIN — CLOPIDOGREL BISULFATE 75 MG: 75 TABLET ORAL at 09:16

## 2024-02-22 RX ADMIN — LEVOTHYROXINE SODIUM 50 MCG: 0.05 TABLET ORAL at 05:37

## 2024-02-22 RX ADMIN — ATORVASTATIN CALCIUM 40 MG: 40 TABLET, FILM COATED ORAL at 09:16

## 2024-02-22 RX ADMIN — ASPIRIN 81 MG: 81 TABLET, COATED ORAL at 09:16

## 2024-02-22 RX ADMIN — CEFTRIAXONE SODIUM 1000 MG: 1 INJECTION, POWDER, FOR SOLUTION INTRAMUSCULAR; INTRAVENOUS at 00:49

## 2024-02-22 RX ADMIN — SODIUM CHLORIDE, PRESERVATIVE FREE 10 ML: 5 INJECTION INTRAVENOUS at 09:17

## 2024-02-22 RX ADMIN — FAMOTIDINE 20 MG: 20 TABLET, FILM COATED ORAL at 09:16

## 2024-02-22 RX ADMIN — AZITHROMYCIN MONOHYDRATE 500 MG: 500 INJECTION, POWDER, LYOPHILIZED, FOR SOLUTION INTRAVENOUS at 01:29

## 2024-02-22 ASSESSMENT — PAIN SCALES - GENERAL: PAINLEVEL_OUTOF10: 0

## 2024-02-22 NOTE — PROGRESS NOTES
CLINICAL PHARMACY NOTE: MEDS TO BEDS    Total # of Prescriptions Filled: 1   The following medications were delivered to the patient:  Levofloxacin 500 mg Tab    Additional Documentation:

## 2024-02-22 NOTE — DISCHARGE SUMMARY
Discharge Summary    Date: 2/22/2024  Patient Name: Erica Perez    YOB: 1950     Age: 73 y.o.    Admit Date: 2/20/2024  Discharge Date: 2/22/2024  Discharge Condition: Fair    Admission Diagnosis  Shortness of breath [R06.02];Pneumonia, unspecified organism [J18.9];Chest pain, unspecified type [R07.9];Pneumonia due to infectious organism, unspecified laterality, unspecified part of lung [J18.9]      Discharge Diagnosis  Principal Problem:    Pneumonia, unspecified organism  Resolved Problems:    * No resolved hospital problems. *      Hospital Stay  Narrative of Hospital Course:  Patient comes in for chest pain which resolved.  Questionable pneumonia, CT scan did not show any pneumonia.  Patient improved with antibiotics.  Was likely upper respiratory infection and will discharge on Levaquin to finish the course.    Consultants:  IP CONSULT TO CARDIOLOGY    Surgeries/procedures Performed:      Treatments:            Discharge Plan/Disposition:  Home    Hospital/Incidental Findings Requiring Follow Up:    Patient Instructions:    Diet:    Activity:  For number of days (if applicable):      Other Instructions:    Provider Follow-Up:   No follow-ups on file.     Significant Diagnostic Studies:    Recent Labs:  Admission on 02/20/2024, Discharged on 02/22/2024  Ventricular Rate                              Date: 02/20/2024  Value: 81          Ref range: BPM                Status: Final  Atrial Rate                                   Date: 02/20/2024  Value: 81          Ref range: BPM                Status: Final  P-R Interval                                  Date: 02/20/2024  Value: 204         Ref range: ms                 Status: Final  QRS Duration                                  Date: 02/20/2024  Value: 136         Ref range: ms                 Status: Final  Q-T Interval                                  Date: 02/20/2024  Value: 412         Ref range: ms                 Status: Final  QTc  Status: Final                Comment: Pediatric calculator link  https://www.kidney.org/professionals/kdoqi/gfr_calculatorped  Effective Oct 3, 2022  These results are not intended for use in patients  <18 years of age.  eGFR results are calculated without  a race factor using the 2021 CKD-EPI equation.  Careful  clinical correlation is recommended, particularly when  comparing to results calculated using previous equations.  The CKD-EPI equation is less accurate in patients with  extremes of muscle mass, extra-renal metabolism of  creatinine, excessive creatinine ingestion, or following  therapy that affects renal tubular secretion.    Calcium                                       Date: 02/20/2024  Value: 8.5         Ref range: 8.5 - 9.9 mg/dL    Status: Final  Total Protein                                 Date: 02/20/2024  Value: 6.9         Ref range: 6.3 - 8.0 g/dL     Status: Final  Albumin                                       Date: 02/20/2024  Value: 3.6         Ref range: 3.5 - 4.6 g/dL     Status: Final  Total Bilirubin                               Date: 02/20/2024  Value: 0.6         Ref range: 0.2 - 0.7 mg/dL    Status: Final  Alkaline Phosphatase                          Date: 02/20/2024  Value: 60          Ref range: 40 - 130 U/L       Status: Final  ALT                                           Date: 02/20/2024  Value: 51 (H)      Ref range: 0 - 33 U/L         Status: Final                Comment: Specimen hemolysis has exceeded the interference as defined  by Roche.  Result may be affected. Suggest recollection if  clinically indicated.    AST                                           Date: 02/20/2024  Value: 58 (H)      Ref range: 0 - 35 U/L         Status: Final                Comment: Specimen hemolysis has exceeded the interference as defined  by Roche.  Value may be falsely increased. Suggest  recollection if clinically indicated.    Globulin                                      Date:

## 2024-02-22 NOTE — FLOWSHEET NOTE
Pt was awake in the bed when change of shift asking about going home, Dr Bae up to see her and ok for her to go home. Pt has a student nurse today and was assisting her with bathing.and fresh sheets.  Pt denies any discomfort took morning medication with out any problems.  Pt ate 100% of breakfast and is sitting up in the chair.  Son up visiting and states she is getting restless, perfect served Dr Jefferson .Electronically signed by Arlene Krishna LPN on 2/22/2024 at 10:36 AM   Received notice that pt can be discharged home .  Paper work done and will review with pt and son.  Student removed IV .Electronically signed by Arlene Krishna LPN on 2/22/2024 at 11:24 AM

## 2024-02-22 NOTE — PROGRESS NOTES
Physical Therapy Missed Treatment   Facility/Department: [facility] MED SURG W264/W264-01    NAME: Erica Perez    : 1950 (73 y.o.)  MRN: [id number]    Account: [id number]  Gender: female    Chart reviewed, attempted PT at 1117. Patient unavailable 2° to:    [] Hold per nsg request    [x] Pt declined due to having been up and walking around throughout the morning and discharging this afternoon per Pt reporting.    [x] Nsg notified   [] Other notified    [] Pt.. off floor for test/procedure.     [] Pt. Unavailable        Will attempt PT treatment again at earliest convenience.      Electronically signed by Tye Childress PTA on 24 at 11:19 AM EST

## 2024-02-22 NOTE — PROGRESS NOTES
CONSULTANT  Anupam Bae DO     REQUESTING PHYSICIAN  Hilton Jefferson MD           PRIMARY CARDIOLOGIST  Dr. Bae     REASON FOR CONSULTATION       Chief Complaint   Patient presents with    Chest Pain       Chest and back pain X1 wk starting at top and going down to stomach,          Hospital Day: 1         Patient is a 73 y.o. female with PMH of CAD with 99% mid RCA stenosis s/p stents x 3 on 10/2023, tobacco abuse, ETOH abuse, HLD, who presents with a chief complaint of chest pain x 1 week. Patient reports that the pain is sharp in center of her chest and radiates to her back. Patient reports that the chest pain is constant at rest and worsens with inhalation. Denies any relieving factors. Patient is followed on a regular basis by Gian Zhu MD.      Troponins 38,40,41  D dimer 1.91  CXR shows Atelectasis, patchy infiltrate left lower lobe with left lower lobe pleural Effusion. Scarring or trace right pleural effusion. No free air.  CTA chest showing   IMPRESSION:  1. No evidence of thoracic aortic aneurysm or dissection.  No evidence for  pulmonary embolism.  2. Cardiomegaly with pulmonary edema and trace to small right and small left  pleural effusions.      2/22/2024: Patient feeling much better today.  She denies any chest pain or shortness of breath.  No further diarrhea.  She is hemodynamically stable.      Past Medical History        Past Medical History:   Diagnosis Date    Coronary artery disease involving native coronary artery of native heart without angina pectoris 10/27/2023             Patient Active Problem List   Diagnosis    Iron deficiency anemia, unspecified    Anemia of chronic renal failure    Encephalopathy, unspecified    Isolated myocarditis    Muscle weakness (generalized)    Myopathy, unspecified    Other symptoms and signs involving cognitive functions and awareness    Unspecified visual disturbance    Obesity with body mass index 30 or greater    Recurrent major

## 2024-02-23 ENCOUNTER — TELEPHONE (OUTPATIENT)
Dept: FAMILY MEDICINE CLINIC | Age: 74
End: 2024-02-23

## 2024-02-23 NOTE — TELEPHONE ENCOUNTER
Care Transitions Initial Follow Up Call    Outreach made within 2 business days of discharge: Yes    Patient: Erica Perez Patient : 1950   MRN: [id number]  Reason for Admission: There are no discharge diagnoses documented for the most recent discharge.  Discharge Date: 24       Spoke with: rang several times, then went busy    Discharge department/facility: [facility]    Scheduled appointment with PCP within 7-14 days    Follow Up  Future Appointments   Date Time Provider Department Center   2024 10:15 AM Carla Reed, APRN - CNP [facility]   3/19/2024 11:30 AM Gian Spears MD [facility]   3/20/2024 11:00 AM Valdemar Gambino MD [facility]   10/3/2024  3:15 PM Anupam Bae DO [facility], MA

## 2024-02-23 NOTE — PROGRESS NOTES
Physical Therapy  Facility/Department: [facility] MED SURG W264/W264-01  Physical Therapy Discharge      NAME: Erica Perez    : 1950 (73 y.o.)  MRN: [id number]    Account: [id number]  Gender: female      Patient has been discharged from [facility] hospital. DC patient from current PT program.      Electronically signed by Melissa Gonzalez PT on 24 at 2:10 PM EST

## 2024-02-23 NOTE — TELEPHONE ENCOUNTER
Care Transitions Initial Follow Up Call    Outreach made within 2 business days of discharge: Yes    Patient: Erica Perez Patient : 1950   MRN: [id number]  Reason for Admission: There are no discharge diagnoses documented for the most recent discharge.  Discharge Date: 24       Spoke with: pt    Discharge department/facility: [facility]    TCM Interactive Patient Contact:  Was patient able to fill all prescriptions: Yes  Was patient instructed to bring all medications to the follow-up visit: Yes  Is patient taking all medications as directed in the discharge summary? Yes  Does patient understand their discharge instructions: Yes  Does patient have questions or concerns that need addressed prior to 7-14 day follow up office visit: no    Scheduled appointment with PCP within 7-14 days    Follow Up  Future Appointments   Date Time Provider Department Center   2024 10:15 AM Carla Reed, APRN - CNP [facility]   3/19/2024 11:30 AM Gian Spears MD [facility]   3/20/2024 11:00 AM Valdemar Gambino MD [facility]   10/3/2024  3:15 PM Anupam Bae DO [facility] Bill[facility], MA

## 2024-02-24 NOTE — PROGRESS NOTES
Physician Progress Note      PATIENT:               CHERYL DONAHUE  [facility] #:                  [id number]  :                       1950  ADMIT DATE:       2024 5:04 PM  DISCH DATE:        2024 12:09 PM  RESPONDING  PROVIDER #:        Hilton JAMESON MD          QUERY TEXT:    Patient admitted with Pneumonia.  Query for possible sepsis with response by   Dr Jameson 24 states  \"patient has pneumonia without sepsis\".   Dr. Jameson's discharge summary 24 states: \"Questionable pneumonia, CT scan   did not show any pneumonia.  Patient improved with antibiotics.  Was likely   upper respiratory infection and will discharge on Levaquin to finish the   course.\"  If possible, please document in progress notes and discharge summary   if you are evaluating and /or treating any of the following:    The medical record reflects the following:  Risk Factors: 73 yof, CAD  Clinical Indicators: per ED notes  pat fatigued appearing, with generalized   flulike illness in addition to chest pain and back pain concerning for   unstable angina..   CXR Atelectasis, patchy infiltrate left lower lobe with   left lower lobe pleural effusion, WBC 13.4, lactic acid 2.2,   D dimer 1.91,   procalcitonin 0.67  Treatment: Vibramycin, Rocephin,  CXR, Pulmonary consult, labs, discharge on   Levaquin to finish the course.    Thank you  Ryan Andrade BSN RN [facility]   M-F 6am-2pm  Options provided:  -- Pneumonia confirmed  -- after careful study, Upper respiratory infection confirmed pneumonia ruled   out  -- Other - I will add my own diagnosis  -- Disagree - Not applicable / Not valid  -- Disagree - Clinically unable to determine / Unknown  -- Refer to Clinical Documentation Reviewer    PROVIDER RESPONSE TEXT:    After careful study, Upper respiratory infection confirmed and pneumonia ruled   out.    Query created by: Ryan Andrade on 2024 2:16 PM      Electronically signed by:  Hilton JAMESON MD 2024  11:59 AM

## 2024-02-29 ENCOUNTER — OFFICE VISIT (OUTPATIENT)
Dept: FAMILY MEDICINE CLINIC | Age: 74
End: 2024-02-29

## 2024-02-29 VITALS
SYSTOLIC BLOOD PRESSURE: 134 MMHG | WEIGHT: 155 LBS | HEART RATE: 69 BPM | OXYGEN SATURATION: 99 % | HEIGHT: 60 IN | DIASTOLIC BLOOD PRESSURE: 72 MMHG | BODY MASS INDEX: 30.43 KG/M2

## 2024-02-29 DIAGNOSIS — Z09 HOSPITAL DISCHARGE FOLLOW-UP: ICD-10-CM

## 2024-02-29 DIAGNOSIS — J18.9 PNEUMONIA DUE TO INFECTIOUS ORGANISM, UNSPECIFIED LATERALITY, UNSPECIFIED PART OF LUNG: Primary | ICD-10-CM

## 2024-03-19 ENCOUNTER — OFFICE VISIT (OUTPATIENT)
Dept: FAMILY MEDICINE CLINIC | Age: 74
End: 2024-03-19
Payer: COMMERCIAL

## 2024-03-19 VITALS
BODY MASS INDEX: 30.63 KG/M2 | TEMPERATURE: 97.5 F | HEART RATE: 72 BPM | WEIGHT: 156 LBS | OXYGEN SATURATION: 98 % | DIASTOLIC BLOOD PRESSURE: 64 MMHG | SYSTOLIC BLOOD PRESSURE: 128 MMHG | HEIGHT: 60 IN

## 2024-03-19 DIAGNOSIS — E88.09 HYPOALBUMINEMIA: ICD-10-CM

## 2024-03-19 DIAGNOSIS — J18.9 PNEUMONIA DUE TO INFECTIOUS ORGANISM, UNSPECIFIED LATERALITY, UNSPECIFIED PART OF LUNG: ICD-10-CM

## 2024-03-19 DIAGNOSIS — R41.3 MEMORY LOSS: ICD-10-CM

## 2024-03-19 DIAGNOSIS — D64.9 ANEMIA, UNSPECIFIED TYPE: ICD-10-CM

## 2024-03-19 DIAGNOSIS — J18.9 PNEUMONIA DUE TO INFECTIOUS ORGANISM, UNSPECIFIED LATERALITY, UNSPECIFIED PART OF LUNG: Primary | ICD-10-CM

## 2024-03-19 LAB
ALBUMIN SERPL-MCNC: 4.7 G/DL (ref 3.5–4.6)
ALP SERPL-CCNC: 74 U/L (ref 40–130)
ALT SERPL-CCNC: 36 U/L (ref 0–33)
ANION GAP SERPL CALCULATED.3IONS-SCNC: 11 MEQ/L (ref 9–15)
AST SERPL-CCNC: 43 U/L (ref 0–35)
BASOPHILS # BLD: 0.1 K/UL (ref 0–0.2)
BASOPHILS NFR BLD: 0.8 %
BILIRUB SERPL-MCNC: 0.6 MG/DL (ref 0.2–0.7)
BUN SERPL-MCNC: 7 MG/DL (ref 8–23)
CALCIUM SERPL-MCNC: 9.3 MG/DL (ref 8.5–9.9)
CHLORIDE SERPL-SCNC: 102 MEQ/L (ref 95–107)
CO2 SERPL-SCNC: 26 MEQ/L (ref 20–31)
CREAT SERPL-MCNC: 0.65 MG/DL (ref 0.5–0.9)
EOSINOPHIL # BLD: 0.7 K/UL (ref 0–0.7)
EOSINOPHIL NFR BLD: 7.2 %
ERYTHROCYTE [DISTWIDTH] IN BLOOD BY AUTOMATED COUNT: 13.5 % (ref 11.5–14.5)
GLOBULIN SER CALC-MCNC: 3.2 G/DL (ref 2.3–3.5)
GLUCOSE SERPL-MCNC: 115 MG/DL (ref 70–99)
HCT VFR BLD AUTO: 43.8 % (ref 37–47)
HGB BLD-MCNC: 14.3 G/DL (ref 12–16)
LYMPHOCYTES # BLD: 2.5 K/UL (ref 1–4.8)
LYMPHOCYTES NFR BLD: 27.7 %
MCH RBC QN AUTO: 30.3 PG (ref 27–31.3)
MCHC RBC AUTO-ENTMCNC: 32.6 % (ref 33–37)
MCV RBC AUTO: 92.8 FL (ref 79.4–94.8)
MONOCYTES # BLD: 0.8 K/UL (ref 0.2–0.8)
MONOCYTES NFR BLD: 8.4 %
NEUTROPHILS # BLD: 5.1 K/UL (ref 1.4–6.5)
NEUTS SEG NFR BLD: 55.7 %
PLATELET # BLD AUTO: 231 K/UL (ref 130–400)
POTASSIUM SERPL-SCNC: 4.3 MEQ/L (ref 3.4–4.9)
PROT SERPL-MCNC: 7.9 G/DL (ref 6.3–8)
RBC # BLD AUTO: 4.72 M/UL (ref 4.2–5.4)
SODIUM SERPL-SCNC: 139 MEQ/L (ref 135–144)
WBC # BLD AUTO: 9.2 K/UL (ref 4.8–10.8)

## 2024-03-19 PROCEDURE — 99215 OFFICE O/P EST HI 40 MIN: CPT | Performed by: FAMILY MEDICINE

## 2024-03-19 PROCEDURE — 1123F ACP DISCUSS/DSCN MKR DOCD: CPT | Performed by: FAMILY MEDICINE

## 2024-03-19 RX ORDER — DONEPEZIL HYDROCHLORIDE 5 MG/1
5 TABLET, FILM COATED ORAL NIGHTLY
Qty: 1 TABLET | Refills: 0
Start: 2024-03-19

## 2024-03-19 ASSESSMENT — ENCOUNTER SYMPTOMS
CHEST TIGHTNESS: 0
PHOTOPHOBIA: 0
SHORTNESS OF BREATH: 0
ABDOMINAL DISTENTION: 0
ABDOMINAL PAIN: 0

## 2024-03-19 NOTE — PATIENT INSTRUCTIONS
Patient is now resolved from prior illness.    Retrial of today's appeal will be done for memory improvement.  Suspect she may have been having nausea response to because she was becoming ill with her pneumonia.  If nausea occurs again we will consider  the Caplyta and benazepril dosing.  If this is still not working due to nausea will refer to neurology.    [place] cognitive exam done today.  Will repeat this in 2 to 3 months.    Follow-up appointment 4 weeks to review labs and status of current treatment.    Laboratories ordered to confirm normalization of hospital noted abnormal CBC and CMP

## 2024-03-19 NOTE — PROGRESS NOTES
response to because she was becoming ill with her pneumonia.  If nausea occurs again we will consider  the Caplyta and benazepril dosing.  If this is still not working due to nausea will refer to neurology.    [place] cognitive exam done today.  Will repeat this in 2 to 3 months.    Follow-up appointment 4 weeks to review labs and status of current treatment.    Laboratories ordered to confirm normalization of hospital noted abnormal CBC and CMP    This note was partially created with the assistance of dictation.  This may lead to grammatical or spelling errors.      Gian Spears M.D.

## 2024-03-20 ENCOUNTER — OFFICE VISIT (OUTPATIENT)
Dept: OBGYN CLINIC | Age: 74
End: 2024-03-20
Payer: COMMERCIAL

## 2024-03-20 ENCOUNTER — TELEPHONE (OUTPATIENT)
Dept: OBGYN CLINIC | Age: 74
End: 2024-03-20

## 2024-03-20 VITALS
HEIGHT: 60 IN | HEART RATE: 84 BPM | WEIGHT: 157 LBS | DIASTOLIC BLOOD PRESSURE: 62 MMHG | SYSTOLIC BLOOD PRESSURE: 118 MMHG | BODY MASS INDEX: 30.82 KG/M2

## 2024-03-20 DIAGNOSIS — N95.2 ATROPHIC VAGINITIS: Primary | ICD-10-CM

## 2024-03-20 PROCEDURE — 99203 OFFICE O/P NEW LOW 30 MIN: CPT | Performed by: OBSTETRICS & GYNECOLOGY

## 2024-03-20 PROCEDURE — 1123F ACP DISCUSS/DSCN MKR DOCD: CPT | Performed by: OBSTETRICS & GYNECOLOGY

## 2024-03-20 RX ORDER — ESTRADIOL 0.1 MG/G
CREAM VAGINAL
Qty: 1 EACH | Refills: 3 | Status: SHIPPED | OUTPATIENT
Start: 2024-03-20

## 2024-03-20 NOTE — TELEPHONE ENCOUNTER
Pt was called back when the phones came back on.  Pt was confused about how much cream she needed to use.  Sade WARNER was contacted who spoke to patient on the phone and explained to her how much to put in the applicator per dose.  Pt understood directions.

## 2024-03-20 NOTE — RESULT ENCOUNTER NOTE
Notify patient current lab values are improving or stable.   No changes to be made in medical management at this time.

## 2024-03-20 NOTE — TELEPHONE ENCOUNTER
Pt called with questions about dosage of estradiol. Phone disconnected before pt question could be answered. Then phone was down. Kept saying registering.

## 2024-03-26 NOTE — PROGRESS NOTES
Erica Perez is a 73 y.o. female who presents here today for complaints of Hematuria (Referred by Dr. Spears.)    Last urine analysis checked , no clear evidence of hematuria noted. Patient with chronic anemia . Patient does mention symptoms of vaginal irritation symptoms on and off with concerns that possible pinkish discharge is vaginal in origin . Patient with history of hysterectomy.       Vitals:  /62 (Site: Right Upper Arm, Position: Sitting, Cuff Size: Medium Adult)   Pulse 84   Ht 1.524 m (5')   Wt 71.2 kg (157 lb)   BMI 30.66 kg/m²   Allergies:  Patient has no known allergies.  Past Medical History:   Diagnosis Date    Coronary artery disease involving native coronary artery of native heart without angina pectoris 10/27/2023     Past Surgical History:   Procedure Laterality Date    CARDIAC PROCEDURE N/A 10/13/2023    Left heart cath / coronary angiography possible percutaneous coronary intervention performed by Anupam Bae DO at [facility]    CARDIAC PROCEDURE N/A 10/13/2023    Percutaneous coronary intervention performed by Anupam Bae DO at [facility]    CHOLECYSTECTOMY      HYSTERECTOMY (CERVIX STATUS UNKNOWN)      OVARY REMOVAL       OB History          2    Para   2    Term   2            AB        Living             SAB        IAB        Ectopic        Molar        Multiple        Live Births   2              No family history on file.  Social History     Socioeconomic History    Marital status:      Spouse name: Not on file    Number of children: Not on file    Years of education: Not on file    Highest education level: Not on file   Occupational History    Not on file   Tobacco Use    Smoking status: Former     Current packs/day: 0.00     Average packs/day: 0.5 packs/day for 10.0 years (5.0 ttl pk-yrs)     Types: Cigarettes     Start date:      Quit date:      Years since quittin.2     Passive exposure: Past

## 2024-04-12 DIAGNOSIS — R07.9 CHEST PAIN, UNSPECIFIED TYPE: ICD-10-CM

## 2024-04-15 RX ORDER — FAMOTIDINE 20 MG/1
20 TABLET, FILM COATED ORAL 2 TIMES DAILY
Qty: 60 TABLET | Refills: 0 | OUTPATIENT
Start: 2024-04-15

## 2024-04-17 ENCOUNTER — OFFICE VISIT (OUTPATIENT)
Dept: FAMILY MEDICINE CLINIC | Age: 74
End: 2024-04-17
Payer: COMMERCIAL

## 2024-04-17 VITALS
HEART RATE: 67 BPM | DIASTOLIC BLOOD PRESSURE: 74 MMHG | OXYGEN SATURATION: 98 % | SYSTOLIC BLOOD PRESSURE: 142 MMHG | TEMPERATURE: 98.2 F | WEIGHT: 157 LBS | BODY MASS INDEX: 30.82 KG/M2 | HEIGHT: 60 IN

## 2024-04-17 DIAGNOSIS — E03.8 OTHER SPECIFIED HYPOTHYROIDISM: ICD-10-CM

## 2024-04-17 DIAGNOSIS — R41.3 MEMORY LOSS: Primary | ICD-10-CM

## 2024-04-17 DIAGNOSIS — D64.9 ANEMIA, UNSPECIFIED TYPE: ICD-10-CM

## 2024-04-17 PROCEDURE — 99214 OFFICE O/P EST MOD 30 MIN: CPT | Performed by: FAMILY MEDICINE

## 2024-04-17 PROCEDURE — 1123F ACP DISCUSS/DSCN MKR DOCD: CPT | Performed by: FAMILY MEDICINE

## 2024-04-17 RX ORDER — DONEPEZIL HYDROCHLORIDE 5 MG/1
5 TABLET, FILM COATED ORAL NIGHTLY
Qty: 30 TABLET | Refills: 0 | Status: SHIPPED | OUTPATIENT
Start: 2024-04-17

## 2024-04-17 RX ORDER — LEVOTHYROXINE SODIUM 0.05 MG/1
50 TABLET ORAL DAILY
Qty: 30 TABLET | Refills: 12 | Status: SHIPPED | OUTPATIENT
Start: 2024-04-17

## 2024-04-17 ASSESSMENT — PATIENT HEALTH QUESTIONNAIRE - PHQ9
10. IF YOU CHECKED OFF ANY PROBLEMS, HOW DIFFICULT HAVE THESE PROBLEMS MADE IT FOR YOU TO DO YOUR WORK, TAKE CARE OF THINGS AT HOME, OR GET ALONG WITH OTHER PEOPLE: NOT DIFFICULT AT ALL
7. TROUBLE CONCENTRATING ON THINGS, SUCH AS READING THE NEWSPAPER OR WATCHING TELEVISION: NOT AT ALL
1. LITTLE INTEREST OR PLEASURE IN DOING THINGS: NOT AT ALL
SUM OF ALL RESPONSES TO PHQ QUESTIONS 1-9: 4
6. FEELING BAD ABOUT YOURSELF - OR THAT YOU ARE A FAILURE OR HAVE LET YOURSELF OR YOUR FAMILY DOWN: NOT AT ALL
5. POOR APPETITE OR OVEREATING: NOT AT ALL
SUM OF ALL RESPONSES TO PHQ QUESTIONS 1-9: 4
9. THOUGHTS THAT YOU WOULD BE BETTER OFF DEAD, OR OF HURTING YOURSELF: NOT AT ALL
SUM OF ALL RESPONSES TO PHQ QUESTIONS 1-9: 4
SUM OF ALL RESPONSES TO PHQ9 QUESTIONS 1 & 2: 0
4. FEELING TIRED OR HAVING LITTLE ENERGY: SEVERAL DAYS
SUM OF ALL RESPONSES TO PHQ QUESTIONS 1-9: 4
2. FEELING DOWN, DEPRESSED OR HOPELESS: NOT AT ALL
8. MOVING OR SPEAKING SO SLOWLY THAT OTHER PEOPLE COULD HAVE NOTICED. OR THE OPPOSITE, BEING SO FIGETY OR RESTLESS THAT YOU HAVE BEEN MOVING AROUND A LOT MORE THAN USUAL: NOT AT ALL
3. TROUBLE FALLING OR STAYING ASLEEP: NEARLY EVERY DAY

## 2024-04-17 NOTE — PATIENT INSTRUCTIONS
Refill thyroid medications ordered last labs in February were normal.    Family will check medication box at home.   new prescription of Aricept that is being sent to the pharmacy today.  Ensure patient is taking medication daily.  Follow-up appointment in 5 weeks to confirm patient tolerated medication as there was nausea noted previously that may have been related to her having pneumonia versus the medication and we will repeat the mini cog exam to evaluate effectiveness of medication.    Labs reviewed and anemia has been improved since pneumonia.

## 2024-04-17 NOTE — PROGRESS NOTES
Diagnosis Orders   1. Memory loss  donepezil (ARICEPT) 5 MG tablet      2. Other specified hypothyroidism  levothyroxine (SYNTHROID) 50 MCG tablet      3. Anemia, unspecified type          Return in about 5 weeks (around 5/22/2024) for aricept f/u with minicog.  Patient Instructions   Refill thyroid medications ordered last labs in February were normal.    Family will check medication box at home.   new prescription of Aricept that is being sent to the pharmacy today.  Ensure patient is taking medication daily.  Follow-up appointment in 5 weeks to confirm patient tolerated medication as there was nausea noted previously that may have been related to her having pneumonia versus the medication and we will repeat the mini cog exam to evaluate effectiveness of medication.    Labs reviewed and anemia has been improved since pneumonia is resolved.  Glucose was elevated but patient was not fasting    Subjective:      Patient ID: Erica Perez is a 73 y.o. female who presents for:  Chief Complaint   Patient presents with    Discuss Labs     & current treatment regimen   X 1 month     Discuss Medications       Patient states she did not  the prescription.  States there was a problem at the pharmacy and they were not able to get a hold of this office to clarify it.  She does come in with a bottle of Aricept 5 mg that still has 9 tablets in the bottle.  Patient states she did not start this medication back up nor did she  the other 1.  The daughter is with her and did not realize this was the case.  With patient's memory issues were not sure that this is accurate.  The daughter did not check patient's medication supply before she left the house.  So the daughter is not certain what she has done either.  We discussed at length further intervention needs with family for patient's medication management.  Talked about pharmacy options as well.        Current Outpatient Medications on File Prior to Visit

## 2024-05-03 ENCOUNTER — OFFICE VISIT (OUTPATIENT)
Dept: BEHAVIORAL/MENTAL HEALTH CLINIC | Age: 74
End: 2024-05-03
Payer: COMMERCIAL

## 2024-05-03 VITALS
HEART RATE: 65 BPM | WEIGHT: 150 LBS | SYSTOLIC BLOOD PRESSURE: 130 MMHG | BODY MASS INDEX: 29.29 KG/M2 | DIASTOLIC BLOOD PRESSURE: 70 MMHG

## 2024-05-03 DIAGNOSIS — G47.00 INSOMNIA, UNSPECIFIED TYPE: ICD-10-CM

## 2024-05-03 DIAGNOSIS — G30.9 MILD NEUROCOGNITIVE DISORDER DUE TO ALZHEIMER'S DISEASE (HCC): ICD-10-CM

## 2024-05-03 DIAGNOSIS — F31.32 BIPOLAR AFFECTIVE DISORDER, CURRENTLY DEPRESSED, MODERATE (HCC): ICD-10-CM

## 2024-05-03 DIAGNOSIS — F06.70 MILD NEUROCOGNITIVE DISORDER DUE TO ALZHEIMER'S DISEASE (HCC): ICD-10-CM

## 2024-05-03 DIAGNOSIS — F31.81 BIPOLAR 2 DISORDER, MAJOR DEPRESSIVE EPISODE (HCC): Primary | ICD-10-CM

## 2024-05-03 PROBLEM — F31.30 BIPOLAR AFFECTIVE DISORDER, CURRENT EPISODE DEPRESSED (HCC): Status: ACTIVE | Noted: 2024-05-03

## 2024-05-03 PROCEDURE — 1123F ACP DISCUSS/DSCN MKR DOCD: CPT | Performed by: REGISTERED NURSE

## 2024-05-03 PROCEDURE — 90833 PSYTX W PT W E/M 30 MIN: CPT | Performed by: REGISTERED NURSE

## 2024-05-03 PROCEDURE — 99215 OFFICE O/P EST HI 40 MIN: CPT | Performed by: REGISTERED NURSE

## 2024-05-03 RX ORDER — LITHIUM CARBONATE 150 MG/1
150 CAPSULE ORAL 2 TIMES DAILY WITH MEALS
Qty: 60 CAPSULE | Refills: 3 | Status: SHIPPED | OUTPATIENT
Start: 2024-05-03

## 2024-05-03 ASSESSMENT — PATIENT HEALTH QUESTIONNAIRE - PHQ9
9. THOUGHTS THAT YOU WOULD BE BETTER OFF DEAD, OR OF HURTING YOURSELF: NOT AT ALL
3. TROUBLE FALLING OR STAYING ASLEEP: NEARLY EVERY DAY
SUM OF ALL RESPONSES TO PHQ9 QUESTIONS 1 & 2: 5
SUM OF ALL RESPONSES TO PHQ QUESTIONS 1-9: 12
SUM OF ALL RESPONSES TO PHQ QUESTIONS 1-9: 12
8. MOVING OR SPEAKING SO SLOWLY THAT OTHER PEOPLE COULD HAVE NOTICED. OR THE OPPOSITE, BEING SO FIGETY OR RESTLESS THAT YOU HAVE BEEN MOVING AROUND A LOT MORE THAN USUAL: NOT AT ALL
7. TROUBLE CONCENTRATING ON THINGS, SUCH AS READING THE NEWSPAPER OR WATCHING TELEVISION: NOT AT ALL
4. FEELING TIRED OR HAVING LITTLE ENERGY: NEARLY EVERY DAY
SUM OF ALL RESPONSES TO PHQ QUESTIONS 1-9: 12
6. FEELING BAD ABOUT YOURSELF - OR THAT YOU ARE A FAILURE OR HAVE LET YOURSELF OR YOUR FAMILY DOWN: SEVERAL DAYS
2. FEELING DOWN, DEPRESSED OR HOPELESS: NEARLY EVERY DAY
5. POOR APPETITE OR OVEREATING: NOT AT ALL
10. IF YOU CHECKED OFF ANY PROBLEMS, HOW DIFFICULT HAVE THESE PROBLEMS MADE IT FOR YOU TO DO YOUR WORK, TAKE CARE OF THINGS AT HOME, OR GET ALONG WITH OTHER PEOPLE: NOT DIFFICULT AT ALL
1. LITTLE INTEREST OR PLEASURE IN DOING THINGS: MORE THAN HALF THE DAYS
SUM OF ALL RESPONSES TO PHQ QUESTIONS 1-9: 12

## 2024-05-03 NOTE — PROGRESS NOTES
PSYCHIATRIC MEDICATION MANAGEMENT PROGRESS NOTE  Mode Greg, PMHNP    05/03/2024     Patient was seen and examined in person, Chart reviewed   Patient's case discussed with other treatment providers       Time spent with Patient: 30 minutes    Chief Complaint: 73 y.o. female seen for follow-up visit for medication management of There were no encounter diagnoses.. Visit attended by patient  Subjective:      Pt reports increased depression. Feeling low, poor motivation, increased sadness, feeling a void.    States I take care of things because I have to. Poor energy.  Increased fatigued.     States symptoms increased      UTI and pneumonia back in February, pt reprots she believe that's when she felt like her depression came on.    Continues to take her dog on a walk daily.    Taking care of self. Continues to attend social events.    Wedding anniversary passe with late .    Per daughter's reprots, pt recently diagnosed with dementia, started aricept    Pt reports sleep has improved. Feels rested, falls asleep well and remains asleep throughout the evening.  Denies nightmares.  Sleep appx 8 hours per night     Denies passive SI, no plan or intent.  Denies HI AVH; no delusions noted    Patient denies medication side effects.     At today's visit, patient denies thoughts of harm to self or others since last appointment, and denies auditory or visual hallucinations.       Appetite (since last visit):   [x] Normal/Unchanged  [] Increased  [] Decreased      Sleep (since last visit):   [x] Normal/Unchanged  [] Increased  [] Decreased      Energy:    [x] Normal/Unchanged  [] Increased  [] Decreased        SI [] Present  [x] Absent    HI  []Present  [x] Absent     Aggression:  [] yes  [x] no    Patient is [x] Able to contract for safety  [] unable to CONTRACT FOR SAFETY     ROS:  [x] All negative/unchanged except if checked. Explain positive(checked items) below:       [] Constitutional  [] Eyes  []

## 2024-05-07 ENCOUNTER — TELEPHONE (OUTPATIENT)
Dept: BEHAVIORAL/MENTAL HEALTH CLINIC | Age: 74
End: 2024-05-07

## 2024-05-07 NOTE — TELEPHONE ENCOUNTER
Pt lm that since taking the lithium started on Friday 5/3/2024 she has been constipated and stomach ache. Patient reports that she did take a lg dose of milk of magnesia and no longer constipated but stomach still hurst.   Per Mode D/MARIA LUZ the Lithium and will discuss change in med at NOV.  Patient gave good verbal understanding.

## 2024-05-08 RX ORDER — LUMATEPERONE 42 MG/1
42 CAPSULE ORAL DAILY
Qty: 30 CAPSULE | Refills: 2 | OUTPATIENT
Start: 2024-05-08

## 2024-05-10 DIAGNOSIS — F31.81 BIPOLAR 2 DISORDER, MAJOR DEPRESSIVE EPISODE (HCC): ICD-10-CM

## 2024-05-10 LAB
ALBUMIN SERPL-MCNC: 4.5 G/DL (ref 3.5–4.6)
ALP SERPL-CCNC: 85 U/L (ref 40–130)
ALT SERPL-CCNC: 44 U/L (ref 0–33)
ANION GAP SERPL CALCULATED.3IONS-SCNC: 14 MEQ/L (ref 9–15)
AST SERPL-CCNC: 38 U/L (ref 0–35)
BILIRUB SERPL-MCNC: 0.5 MG/DL (ref 0.2–0.7)
BUN SERPL-MCNC: 12 MG/DL (ref 8–23)
CALCIUM SERPL-MCNC: 9.8 MG/DL (ref 8.5–9.9)
CHLORIDE SERPL-SCNC: 102 MEQ/L (ref 95–107)
CO2 SERPL-SCNC: 26 MEQ/L (ref 20–31)
CREAT SERPL-MCNC: 0.62 MG/DL (ref 0.5–0.9)
GLOBULIN SER CALC-MCNC: 3.3 G/DL (ref 2.3–3.5)
GLUCOSE SERPL-MCNC: 139 MG/DL (ref 70–99)
LITHIUM SERPL-MCNC: 0.4 MEQ/L (ref 0.6–1.2)
POTASSIUM SERPL-SCNC: 5.1 MEQ/L (ref 3.4–4.9)
PROT SERPL-MCNC: 7.8 G/DL (ref 6.3–8)
SODIUM SERPL-SCNC: 142 MEQ/L (ref 135–144)

## 2024-05-17 ENCOUNTER — OFFICE VISIT (OUTPATIENT)
Dept: BEHAVIORAL/MENTAL HEALTH CLINIC | Age: 74
End: 2024-05-17
Payer: COMMERCIAL

## 2024-05-17 VITALS — BODY MASS INDEX: 30.47 KG/M2 | WEIGHT: 156 LBS | DIASTOLIC BLOOD PRESSURE: 74 MMHG | SYSTOLIC BLOOD PRESSURE: 130 MMHG

## 2024-05-17 DIAGNOSIS — F03.90 MAJOR NEUROCOGNITIVE DISORDER (HCC): ICD-10-CM

## 2024-05-17 DIAGNOSIS — G47.00 INSOMNIA, UNSPECIFIED TYPE: ICD-10-CM

## 2024-05-17 DIAGNOSIS — R53.83 OTHER FATIGUE: ICD-10-CM

## 2024-05-17 DIAGNOSIS — F31.32 BIPOLAR AFFECTIVE DISORDER, CURRENTLY DEPRESSED, MODERATE (HCC): Primary | ICD-10-CM

## 2024-05-17 DIAGNOSIS — R41.3 MEMORY LOSS: ICD-10-CM

## 2024-05-17 PROCEDURE — 1123F ACP DISCUSS/DSCN MKR DOCD: CPT | Performed by: REGISTERED NURSE

## 2024-05-17 PROCEDURE — 99214 OFFICE O/P EST MOD 30 MIN: CPT | Performed by: REGISTERED NURSE

## 2024-05-17 RX ORDER — LITHIUM CARBONATE 150 MG/1
150 CAPSULE ORAL 2 TIMES DAILY WITH MEALS
Qty: 60 CAPSULE | Refills: 5 | Status: SHIPPED | OUTPATIENT
Start: 2024-05-17

## 2024-05-17 ASSESSMENT — PATIENT HEALTH QUESTIONNAIRE - PHQ9
SUM OF ALL RESPONSES TO PHQ QUESTIONS 1-9: 1
1. LITTLE INTEREST OR PLEASURE IN DOING THINGS: NOT AT ALL
SUM OF ALL RESPONSES TO PHQ QUESTIONS 1-9: 1
SUM OF ALL RESPONSES TO PHQ QUESTIONS 1-9: 1
8. MOVING OR SPEAKING SO SLOWLY THAT OTHER PEOPLE COULD HAVE NOTICED. OR THE OPPOSITE, BEING SO FIGETY OR RESTLESS THAT YOU HAVE BEEN MOVING AROUND A LOT MORE THAN USUAL: NOT AT ALL
10. IF YOU CHECKED OFF ANY PROBLEMS, HOW DIFFICULT HAVE THESE PROBLEMS MADE IT FOR YOU TO DO YOUR WORK, TAKE CARE OF THINGS AT HOME, OR GET ALONG WITH OTHER PEOPLE: NOT DIFFICULT AT ALL
7. TROUBLE CONCENTRATING ON THINGS, SUCH AS READING THE NEWSPAPER OR WATCHING TELEVISION: NOT AT ALL
2. FEELING DOWN, DEPRESSED OR HOPELESS: NOT AT ALL
3. TROUBLE FALLING OR STAYING ASLEEP: NOT AT ALL
SUM OF ALL RESPONSES TO PHQ9 QUESTIONS 1 & 2: 0
9. THOUGHTS THAT YOU WOULD BE BETTER OFF DEAD, OR OF HURTING YOURSELF: NOT AT ALL
SUM OF ALL RESPONSES TO PHQ QUESTIONS 1-9: 1
6. FEELING BAD ABOUT YOURSELF - OR THAT YOU ARE A FAILURE OR HAVE LET YOURSELF OR YOUR FAMILY DOWN: NOT AT ALL
4. FEELING TIRED OR HAVING LITTLE ENERGY: SEVERAL DAYS
5. POOR APPETITE OR OVEREATING: NOT AT ALL

## 2024-05-17 NOTE — PROGRESS NOTES
Musculoskeletal  [] Skin/Breast  [] Neurological  [] Endocrine  [] Heme/Lymph  [] Allergic/Immunologic      MEDICATIONS:    Current Outpatient Medications:     lithium 150 MG capsule, Take 1 capsule by mouth 2 times daily (with meals), Disp: 60 capsule, Rfl: 3    Lumateperone Tosylate (CAPLYTA) 42 MG capsule, Take 1 capsule by mouth daily, Disp: 91 capsule, Rfl: 0    levothyroxine (SYNTHROID) 50 MCG tablet, Take 1 tablet by mouth daily, Disp: 30 tablet, Rfl: 12    donepezil (ARICEPT) 5 MG tablet, Take 1 tablet by mouth nightly, Disp: 30 tablet, Rfl: 0    estradiol (ESTRACE VAGINAL) 0.1 MG/GM vaginal cream, Apply vaginally twice weekly, Disp: 1 each, Rfl: 3    CAPLYTA 42 MG capsule, TAKE 1 CAPSULE BY MOUTH DAILY, Disp: 30 capsule, Rfl: 2    atorvastatin (LIPITOR) 40 MG tablet, TAKE 1 TABLET BY MOUTH DAILY, Disp: 90 tablet, Rfl: 3    clopidogrel (PLAVIX) 75 MG tablet, Take 1 tablet by mouth daily, Disp: 90 tablet, Rfl: 3    aspirin 81 MG EC tablet, Take 1 tablet by mouth daily, Disp: 30 tablet, Rfl: 3    nitroGLYCERIN (NITROSTAT) 0.4 MG SL tablet, Place 1 tablet under the tongue every 5 minutes as needed for Chest pain up to max of 3 total doses. If no relief after 1 dose, call 911., Disp: 25 tablet, Rfl: 3    Examination:    Vitals:   Vitals:    05/17/24 0842   BP: 130/74       Wt Readings from Last 3 Encounters:   05/17/24 70.8 kg (156 lb)   05/03/24 68 kg (150 lb)   04/17/24 71.2 kg (157 lb)       Labs:   Lithium Lvl   Date Value Ref Range Status   05/10/2024 0.4 (L) 0.6 - 1.2 mEq/L Final     TSH   Date Value Ref Range Status   02/06/2024 1.430 0.440 - 3.860 uIU/mL Final   06/08/2023 1.700 0.440 - 3.860 uIU/mL Final          Assessment:     Mental Status Examination:      Level of consciousness:  alert and oriented to person, place, and situation  Appearance:  well-appearing grooming and good hygiene  Behavior/Motor:  no abnormalities noted  Attitude toward examiner:  attentive and good eye contact  Speech:

## 2024-05-20 DIAGNOSIS — R41.3 MEMORY LOSS: ICD-10-CM

## 2024-05-20 RX ORDER — DONEPEZIL HYDROCHLORIDE 5 MG/1
5 TABLET, FILM COATED ORAL NIGHTLY
Qty: 30 TABLET | Refills: 0 | OUTPATIENT
Start: 2024-05-20

## 2024-05-20 RX ORDER — DONEPEZIL HYDROCHLORIDE 5 MG/1
5 TABLET, FILM COATED ORAL NIGHTLY
Qty: 30 TABLET | Refills: 5 | Status: SHIPPED | OUTPATIENT
Start: 2024-05-20

## 2024-05-20 NOTE — TELEPHONE ENCOUNTER
Future Appointments    Encounter Information   Provider Department Appt Notes   6/5/2024 Gian Spears MD [facility] Primary Care Return in about 5 weeks (around 5/22/2024) for aricept f/u with minicog.   10/3/2024 Anupam Bae DO [facility] Cardiology 1 year f/u     Past Visits    Date Provider Specialty Visit Type Primary Dx   05/17/2024 Mode Garza APRN - CNP Behavioral Health Office Visit Bipolar affective disorder, currently depressed, moderate (HCC)   05/03/2024 Mode Garza APRN - CNP Behavioral Health Office Visit Bipolar 2 disorder, major depressive episode (HCC)   04/17/2024 Gian Spears MD Family Medicine Office Visit Memory loss

## 2024-06-05 ENCOUNTER — OFFICE VISIT (OUTPATIENT)
Dept: FAMILY MEDICINE CLINIC | Age: 74
End: 2024-06-05
Payer: COMMERCIAL

## 2024-06-05 VITALS
BODY MASS INDEX: 29.84 KG/M2 | DIASTOLIC BLOOD PRESSURE: 78 MMHG | HEIGHT: 60 IN | OXYGEN SATURATION: 98 % | SYSTOLIC BLOOD PRESSURE: 132 MMHG | TEMPERATURE: 98.7 F | HEART RATE: 55 BPM | WEIGHT: 152 LBS

## 2024-06-05 DIAGNOSIS — R41.3 MEMORY LOSS: ICD-10-CM

## 2024-06-05 DIAGNOSIS — H61.23 EXCESSIVE CERUMEN IN BOTH EAR CANALS: Primary | ICD-10-CM

## 2024-06-05 PROCEDURE — 99214 OFFICE O/P EST MOD 30 MIN: CPT | Performed by: FAMILY MEDICINE

## 2024-06-05 PROCEDURE — 1123F ACP DISCUSS/DSCN MKR DOCD: CPT | Performed by: FAMILY MEDICINE

## 2024-06-05 NOTE — PATIENT INSTRUCTIONS
Patient will initiate hydrogen peroxide drops to the ears 2 nights per week.  2 drops per ear.  This is for the excess cerumen.    No change in medication at this time.  Aware patient had lithium added by behavioral health.

## 2024-06-05 NOTE — PROGRESS NOTES
estradiol 0.1 MG/GM vaginal cream  Commonly known as: ESTRACE VAGINAL  Apply vaginally twice weekly     levothyroxine 50 MCG tablet  Commonly known as: SYNTHROID  Take 1 tablet by mouth daily     lithium 150 MG capsule  Take 1 capsule by mouth 2 times daily (with meals)     nitroGLYCERIN 0.4 MG SL tablet  Commonly known as: NITROSTAT  Place 1 tablet under the tongue every 5 minutes as needed for Chest pain up to max of 3 total doses. If no relief after 1 dose, call 911.           * This list has 2 medication(s) that are the same as other medications prescribed for you. Read the directions carefully, and ask your doctor or other care provider to review them with you.                    Plan:   Return in about 3 months (around 9/5/2024) for for review of outcome of today's recommendation- diego cognitive exam repeat.    Patient Instructions   Patient will initiate hydrogen peroxide drops to the ears 2 nights per week.  2 drops per ear.  This is for the excess cerumen.    No change in medication at this time.  Aware patient had lithium added by behavioral health.    This note was partially created with the assistance of dictation.  This may lead to grammatical or spelling errors.      Gian Spears M.D.

## 2024-07-01 ENCOUNTER — TELEPHONE (OUTPATIENT)
Dept: FAMILY MEDICINE CLINIC | Age: 74
End: 2024-07-01

## 2024-07-01 NOTE — TELEPHONE ENCOUNTER
Patient is calling in and asking about stopping the Lithium 150 mg    She is stating that ever since she started the med she is very tired   she would like to know if she can stop taking the med.     Thank you

## 2024-07-05 DIAGNOSIS — E03.8 OTHER SPECIFIED HYPOTHYROIDISM: ICD-10-CM

## 2024-07-05 DIAGNOSIS — R41.3 MEMORY LOSS: ICD-10-CM

## 2024-07-05 RX ORDER — ASPIRIN 81 MG/1
81 TABLET ORAL DAILY
Qty: 30 TABLET | Refills: 12 | Status: SHIPPED | OUTPATIENT
Start: 2024-07-05

## 2024-07-05 RX ORDER — DONEPEZIL HYDROCHLORIDE 5 MG/1
5 TABLET, FILM COATED ORAL NIGHTLY
Qty: 30 TABLET | Refills: 12 | Status: SHIPPED | OUTPATIENT
Start: 2024-07-05

## 2024-07-05 RX ORDER — LEVOTHYROXINE SODIUM 0.05 MG/1
50 TABLET ORAL DAILY
Qty: 30 TABLET | Refills: 12 | Status: SHIPPED | OUTPATIENT
Start: 2024-07-05

## 2024-07-05 RX ORDER — CLOPIDOGREL BISULFATE 75 MG/1
75 TABLET ORAL DAILY
Qty: 90 TABLET | Refills: 3 | Status: SHIPPED | OUTPATIENT
Start: 2024-07-05

## 2024-07-05 RX ORDER — ATORVASTATIN CALCIUM 40 MG/1
40 TABLET, FILM COATED ORAL DAILY
Qty: 90 TABLET | Refills: 3 | Status: SHIPPED | OUTPATIENT
Start: 2024-07-05

## 2024-07-05 NOTE — TELEPHONE ENCOUNTER
Comments: Please review, thanks.    Last Office Visit (last PCP visit):   6/5/2024    Next Visit Date:  Future Appointments   Date Time Provider Department Center   7/25/2024 10:00 AM Mode Garza, APRN - CNP [facility]X AM  [facility]   9/5/2024  9:45 AM Gian Spears MD [facility]   10/3/2024  3:15 PM Holiday, DO [facility]       **If hasn't been seen in over a year OR hasn't followed up according to last diabetes/ADHD visit, make appointment for patient before sending refill to provider.    Rx requested:  Requested Prescriptions     Pending Prescriptions Disp Refills    atorvastatin (LIPITOR) 40 MG tablet 90 tablet 3     Sig: Take 1 tablet by mouth daily    levothyroxine (SYNTHROID) 50 MCG tablet 30 tablet 12     Sig: Take 1 tablet by mouth daily    aspirin 81 MG EC tablet 30 tablet 3     Sig: Take 1 tablet by mouth daily    clopidogrel (PLAVIX) 75 MG tablet 90 tablet 3     Sig: Take 1 tablet by mouth daily    donepezil (ARICEPT) 5 MG tablet 30 tablet 5     Sig: Take 1 tablet by mouth nightly

## 2024-07-05 NOTE — TELEPHONE ENCOUNTER
MEDICATION REFILL REQUEST     Rx Requested    Requested Prescriptions     Pending Prescriptions Disp Refills    lithium 150 MG capsule 60 capsule 5     Sig: Take 1 capsule by mouth 2 times daily (with meals)         Patient's Last Office Visit   5/17/2024      Patient's Next Office Visit   Future Appointments   Date Time Provider Department Center   7/25/2024 10:00 AM Mode Garza APRN - CNP [facility] AM  [facility]   9/5/2024  9:45 AM Gian Spears MD [facility]   10/3/2024  3:15 PM Holziggy, DO [facility]         Other comments

## 2024-07-08 RX ORDER — LITHIUM CARBONATE 150 MG/1
150 CAPSULE ORAL 2 TIMES DAILY WITH MEALS
Qty: 60 CAPSULE | Refills: 5 | OUTPATIENT
Start: 2024-07-08

## 2024-07-23 NOTE — TELEPHONE ENCOUNTER
Daughter states patient is no longer using [facility]. She is inquiring if her script for Lithium 150 mg can be sent to [facility].

## 2024-07-23 NOTE — TELEPHONE ENCOUNTER
Comments:     Last Office Visit (last PCP visit):   5/17/2024    Next Visit Date:  Future Appointments   Date Time Provider Department Center   7/25/2024 10:00 AM Mode Garza, APRN - CNP [facility] AM  [facility]   9/5/2024  9:45 AM Gian Spears MD [facility]   10/3/2024  3:15 PM Holiday, DO [facility]       **If hasn't been seen in over a year OR hasn't followed up according to last diabetes/ADHD visit, make appointment for patient before sending refill to provider.    Rx requested:  Requested Prescriptions     Pending Prescriptions Disp Refills    lithium 150 MG capsule 60 capsule 5     Sig: Take 1 capsule by mouth 2 times daily (with meals)

## 2024-07-24 RX ORDER — LITHIUM CARBONATE 150 MG/1
150 CAPSULE ORAL 2 TIMES DAILY WITH MEALS
Qty: 60 CAPSULE | Refills: 5 | Status: SHIPPED | OUTPATIENT
Start: 2024-07-24 | End: 2024-07-25 | Stop reason: SDUPTHER

## 2024-07-25 ENCOUNTER — OFFICE VISIT (OUTPATIENT)
Dept: BEHAVIORAL/MENTAL HEALTH CLINIC | Age: 74
End: 2024-07-25
Payer: COMMERCIAL

## 2024-07-25 VITALS
WEIGHT: 156 LBS | HEART RATE: 88 BPM | SYSTOLIC BLOOD PRESSURE: 128 MMHG | DIASTOLIC BLOOD PRESSURE: 70 MMHG | BODY MASS INDEX: 30.47 KG/M2

## 2024-07-25 DIAGNOSIS — G47.00 INSOMNIA, UNSPECIFIED TYPE: ICD-10-CM

## 2024-07-25 DIAGNOSIS — G30.9 MILD NEUROCOGNITIVE DISORDER DUE TO ALZHEIMER'S DISEASE (HCC): ICD-10-CM

## 2024-07-25 DIAGNOSIS — F31.32 BIPOLAR AFFECTIVE DISORDER, CURRENTLY DEPRESSED, MODERATE (HCC): Primary | ICD-10-CM

## 2024-07-25 DIAGNOSIS — F06.70 MILD NEUROCOGNITIVE DISORDER DUE TO ALZHEIMER'S DISEASE (HCC): ICD-10-CM

## 2024-07-25 PROCEDURE — 1123F ACP DISCUSS/DSCN MKR DOCD: CPT | Performed by: REGISTERED NURSE

## 2024-07-25 PROCEDURE — 99214 OFFICE O/P EST MOD 30 MIN: CPT | Performed by: REGISTERED NURSE

## 2024-07-25 RX ORDER — LITHIUM CARBONATE 150 MG/1
150 CAPSULE ORAL 2 TIMES DAILY WITH MEALS
Qty: 60 CAPSULE | Refills: 5 | Status: SHIPPED | OUTPATIENT
Start: 2024-07-25

## 2024-07-25 NOTE — PROGRESS NOTES
PSYCHIATRIC MEDICATION MANAGEMENT PROGRESS NOTE  Mode Garza, PMHNP    07/25/2024  1000     Patient was seen and examined in person, Chart reviewed   Patient's case discussed with other treatment providers       Time spent with Patient: 30 minutes    Chief Complaint: 73 y.o. female seen for follow-up visit for medication management of The primary encounter diagnosis was Bipolar affective disorder, currently depressed, moderate ([facility]). Diagnoses of Mild neurocognitive disorder due to Alzheimer's disease (HCC) and Insomnia, unspecified type were also pertinent to this visit.. Visit attended by patient  Subjective:      Reports feeling better, overall mood improvement.     Motivation improved, continues to walk dog daily.  States following through with daily activities.  Increased socialization with sister who is in from [place]    Per daughter's reprots, pt recently diagnosed with dementia, started aricept    Pt appears brighter    Pt reports sleep has improved. Feels rested, falls asleep well and remains asleep throughout the evening.  Denies nightmares.  Sleep appx 8 hours per night     Denies passive SI, no plan or intent.  Denies HI AVH; no delusions noted    Patient denies medication side effects.     At today's visit, patient denies thoughts of harm to self or others since last appointment, and denies auditory or visual hallucinations.       Appetite (since last visit):   [x] Normal/Unchanged  [] Increased  [] Decreased      Sleep (since last visit):   [x] Normal/Unchanged  [] Increased  [] Decreased      Energy:    [x] Normal/Unchanged  [] Increased  [] Decreased        SI [] Present  [x] Absent    HI  []Present  [x] Absent     Aggression:  [] yes  [x] no    Patient is [x] Able to contract for safety  [] unable to CONTRACT FOR SAFETY     ROS:  [x] All negative/unchanged except if checked. Explain positive(checked items) below:       [] Constitutional  [] Eyes  [] Ear/Nose/Mouth/Throat  [] Respiratory  [] CV  []

## 2024-09-04 ENCOUNTER — HOSPITAL ENCOUNTER (OUTPATIENT)
Dept: WOMENS IMAGING | Age: 74
Discharge: HOME | End: 2024-09-06
Payer: COMMERCIAL

## 2024-09-04 ENCOUNTER — TELEPHONE (OUTPATIENT)
Dept: FAMILY MEDICINE CLINIC | Age: 74
End: 2024-09-04

## 2024-09-04 ENCOUNTER — OFFICE VISIT (OUTPATIENT)
Dept: FAMILY MEDICINE CLINIC | Age: 74
End: 2024-09-04
Payer: COMMERCIAL

## 2024-09-04 VITALS
OXYGEN SATURATION: 98 % | BODY MASS INDEX: 30.04 KG/M2 | DIASTOLIC BLOOD PRESSURE: 70 MMHG | SYSTOLIC BLOOD PRESSURE: 140 MMHG | HEART RATE: 72 BPM | HEIGHT: 60 IN | WEIGHT: 153 LBS

## 2024-09-04 DIAGNOSIS — D64.9 ANEMIA, UNSPECIFIED TYPE: ICD-10-CM

## 2024-09-04 DIAGNOSIS — R53.83 OTHER FATIGUE: ICD-10-CM

## 2024-09-04 DIAGNOSIS — Z51.81 ENCOUNTER FOR MEDICATION MONITORING: ICD-10-CM

## 2024-09-04 DIAGNOSIS — E03.8 OTHER SPECIFIED HYPOTHYROIDISM: ICD-10-CM

## 2024-09-04 DIAGNOSIS — R41.3 MEMORY LOSS: ICD-10-CM

## 2024-09-04 DIAGNOSIS — Z12.31 ENCOUNTER FOR SCREENING MAMMOGRAM FOR MALIGNANT NEOPLASM OF BREAST: ICD-10-CM

## 2024-09-04 DIAGNOSIS — E03.8 OTHER SPECIFIED HYPOTHYROIDISM: Primary | ICD-10-CM

## 2024-09-04 LAB
ALBUMIN SERPL-MCNC: 4.4 G/DL (ref 3.5–4.6)
ALP SERPL-CCNC: 72 U/L (ref 40–130)
ALT SERPL-CCNC: 37 U/L (ref 0–33)
ANION GAP SERPL CALCULATED.3IONS-SCNC: 15 MEQ/L (ref 9–15)
AST SERPL-CCNC: 40 U/L (ref 0–35)
BASOPHILS # BLD: 0.1 K/UL (ref 0–0.2)
BASOPHILS NFR BLD: 0.7 %
BILIRUB SERPL-MCNC: 0.5 MG/DL (ref 0.2–0.7)
BUN SERPL-MCNC: 11 MG/DL (ref 8–23)
CALCIUM SERPL-MCNC: 9.4 MG/DL (ref 8.5–9.9)
CHLORIDE SERPL-SCNC: 102 MEQ/L (ref 95–107)
CO2 SERPL-SCNC: 24 MEQ/L (ref 20–31)
CREAT SERPL-MCNC: 0.69 MG/DL (ref 0.5–0.9)
EOSINOPHIL # BLD: 0.2 K/UL (ref 0–0.7)
EOSINOPHIL NFR BLD: 1.9 %
ERYTHROCYTE [DISTWIDTH] IN BLOOD BY AUTOMATED COUNT: 12.7 % (ref 11.5–14.5)
GLOBULIN SER CALC-MCNC: 3 G/DL (ref 2.3–3.5)
GLUCOSE SERPL-MCNC: 162 MG/DL (ref 70–99)
HCT VFR BLD AUTO: 41.9 % (ref 37–47)
HGB BLD-MCNC: 13.2 G/DL (ref 12–16)
LITHIUM SERPL-MCNC: 0.4 MEQ/L (ref 0.6–1.2)
LYMPHOCYTES # BLD: 1.7 K/UL (ref 1–4.8)
LYMPHOCYTES NFR BLD: 21 %
MCH RBC QN AUTO: 30.1 PG (ref 27–31.3)
MCHC RBC AUTO-ENTMCNC: 31.5 % (ref 33–37)
MCV RBC AUTO: 95.4 FL (ref 79.4–94.8)
MONOCYTES # BLD: 0.5 K/UL (ref 0.2–0.8)
MONOCYTES NFR BLD: 6 %
NEUTROPHILS # BLD: 5.8 K/UL (ref 1.4–6.5)
NEUTS SEG NFR BLD: 70.2 %
PLATELET # BLD AUTO: 239 K/UL (ref 130–400)
POTASSIUM SERPL-SCNC: 5 MEQ/L (ref 3.4–4.9)
PROT SERPL-MCNC: 7.4 G/DL (ref 6.3–8)
RBC # BLD AUTO: 4.39 M/UL (ref 4.2–5.4)
SODIUM SERPL-SCNC: 141 MEQ/L (ref 135–144)
TSH REFLEX: 2.09 UIU/ML (ref 0.44–3.86)
WBC # BLD AUTO: 8.2 K/UL (ref 4.8–10.8)

## 2024-09-04 PROCEDURE — 77063 BREAST TOMOSYNTHESIS BI: CPT

## 2024-09-04 PROCEDURE — 1123F ACP DISCUSS/DSCN MKR DOCD: CPT | Performed by: FAMILY MEDICINE

## 2024-09-04 PROCEDURE — 99214 OFFICE O/P EST MOD 30 MIN: CPT | Performed by: FAMILY MEDICINE

## 2024-09-04 RX ORDER — PANTOPRAZOLE SODIUM 40 MG/1
40 TABLET, DELAYED RELEASE ORAL
COMMUNITY
Start: 2024-06-28

## 2024-09-04 ASSESSMENT — ENCOUNTER SYMPTOMS
SHORTNESS OF BREATH: 0
ABDOMINAL PAIN: 0
ABDOMINAL DISTENTION: 0
CHEST TIGHTNESS: 0
PHOTOPHOBIA: 0

## 2024-09-04 NOTE — PROGRESS NOTES
tablet by mouth daily 30 tablet 12    clopidogrel (PLAVIX) 75 MG tablet Take 1 tablet by mouth daily 90 tablet 3    donepezil (ARICEPT) 5 MG tablet Take 1 tablet by mouth nightly 30 tablet 12    Lumateperone Tosylate (CAPLYTA) 42 MG capsule Take 1 capsule by mouth daily 91 capsule 0    estradiol (ESTRACE VAGINAL) 0.1 MG/GM vaginal cream Apply vaginally twice weekly 1 each 3    nitroGLYCERIN (NITROSTAT) 0.4 MG SL tablet Place 1 tablet under the tongue every 5 minutes as needed for Chest pain up to max of 3 total doses. If no relief after 1 dose, call 911. 25 tablet 3     No current facility-administered medications on file prior to visit.     Past Medical History:   Diagnosis Date    Coronary artery disease involving native coronary artery of native heart without angina pectoris 10/27/2023     Past Surgical History:   Procedure Laterality Date    CARDIAC PROCEDURE N/A 10/13/2023    Left heart cath / coronary angiography possible percutaneous coronary intervention performed by Anupam Bae DO at [facility] CARDIAC CATH LAB    CARDIAC PROCEDURE N/A 10/13/2023    Percutaneous coronary intervention performed by Anupam Bae DO at [facility] CARDIAC CATH LAB    CHOLECYSTECTOMY      HYSTERECTOMY (CERVIX STATUS UNKNOWN)      OVARY REMOVAL       Social History     Socioeconomic History    Marital status:      Spouse name: Not on file    Number of children: Not on file    Years of education: Not on file    Highest education level: Not on file   Occupational History    Not on file   Tobacco Use    Smoking status: Former     Current packs/day: 0.00     Average packs/day: 0.5 packs/day for 10.0 years (5.0 ttl pk-yrs)     Types: Cigarettes     Start date:      Quit date:      Years since quittin.7     Passive exposure: Past    Smokeless tobacco: Never   Vaping Use    Vaping status: Never Used   Substance and Sexual Activity    Alcohol use: Yes     Comment: social    Drug use: Never    Sexual activity: Not on file

## 2024-09-04 NOTE — PATIENT INSTRUCTIONS
Labs will be done to look for sources of fatigue including urine culture as she had a positive urine culture this year once already.    No change in medication at this time    May need to adjust lithium levels

## 2024-09-05 LAB
FOLATE: >20 NG/ML (ref 4.8–24.2)
IRON % SATURATION: 21 % (ref 20–55)
IRON: 76 UG/DL (ref 37–145)
TOTAL IRON BINDING CAPACITY: 364 UG/DL (ref 250–450)
UNSATURATED IRON BINDING CAPACITY: 288 UG/DL (ref 112–347)
VITAMIN B-12: 747 PG/ML (ref 232–1245)

## 2024-09-06 LAB — BACTERIA UR CULT: NORMAL

## 2024-09-17 ENCOUNTER — OFFICE VISIT (OUTPATIENT)
Dept: FAMILY MEDICINE CLINIC | Age: 74
End: 2024-09-17
Payer: COMMERCIAL

## 2024-09-17 VITALS
OXYGEN SATURATION: 99 % | HEART RATE: 69 BPM | WEIGHT: 155 LBS | DIASTOLIC BLOOD PRESSURE: 60 MMHG | SYSTOLIC BLOOD PRESSURE: 142 MMHG | HEIGHT: 60 IN | BODY MASS INDEX: 30.43 KG/M2

## 2024-09-17 DIAGNOSIS — G30.9 MILD NEUROCOGNITIVE DISORDER DUE TO ALZHEIMER'S DISEASE (HCC): ICD-10-CM

## 2024-09-17 DIAGNOSIS — F06.70 MILD NEUROCOGNITIVE DISORDER DUE TO ALZHEIMER'S DISEASE (HCC): ICD-10-CM

## 2024-09-17 DIAGNOSIS — F03.90 MAJOR NEUROCOGNITIVE DISORDER (HCC): ICD-10-CM

## 2024-09-17 DIAGNOSIS — F31.81 MODERATE BIPOLAR II DISORDER, MOST RECENT EPISODE MAJOR DEPRESSIVE (HCC): ICD-10-CM

## 2024-09-17 DIAGNOSIS — H61.22 IMPACTED CERUMEN OF LEFT EAR: ICD-10-CM

## 2024-09-17 DIAGNOSIS — R53.83 OTHER FATIGUE: ICD-10-CM

## 2024-09-17 DIAGNOSIS — R03.0 ELEVATED BLOOD PRESSURE READING: ICD-10-CM

## 2024-09-17 DIAGNOSIS — R73.09 ABNORMAL GLUCOSE: Primary | ICD-10-CM

## 2024-09-17 PROBLEM — E78.00 HYPERCHOLESTEROLEMIA: Status: ACTIVE | Noted: 2017-09-08

## 2024-09-17 PROBLEM — Z87.891 HISTORY OF TOBACCO USE: Status: ACTIVE | Noted: 2019-08-19

## 2024-09-17 PROBLEM — M62.9 DISORDER OF MUSCLE: Status: ACTIVE | Noted: 2022-08-24

## 2024-09-17 PROBLEM — K85.90 ACUTE PANCREATITIS: Status: ACTIVE | Noted: 2021-11-18

## 2024-09-17 PROBLEM — M85.80 OSTEOPENIA: Status: ACTIVE | Noted: 2017-12-08

## 2024-09-17 PROBLEM — E66.3 OVERWEIGHT: Status: ACTIVE | Noted: 2017-09-08

## 2024-09-17 PROBLEM — G93.9 DISORDER OF BRAIN: Status: ACTIVE | Noted: 2022-07-29

## 2024-09-17 PROBLEM — H51.8: Status: ACTIVE | Noted: 2024-09-17

## 2024-09-17 PROBLEM — F31.9 BIPOLAR DISORDER (HCC): Status: ACTIVE | Noted: 2017-09-08

## 2024-09-17 PROBLEM — E03.9 HYPOTHYROIDISM: Status: ACTIVE | Noted: 2021-02-09

## 2024-09-17 PROBLEM — H02.409 PTOSIS OF EYELID: Status: ACTIVE | Noted: 2024-09-17

## 2024-09-17 PROBLEM — F17.201 TOBACCO DEPENDENCE IN REMISSION: Status: ACTIVE | Noted: 2020-05-29

## 2024-09-17 LAB — HBA1C MFR BLD: 5.4 %

## 2024-09-17 PROCEDURE — 99214 OFFICE O/P EST MOD 30 MIN: CPT | Performed by: FAMILY MEDICINE

## 2024-09-17 PROCEDURE — 1123F ACP DISCUSS/DSCN MKR DOCD: CPT | Performed by: FAMILY MEDICINE

## 2024-09-17 PROCEDURE — 83036 HEMOGLOBIN GLYCOSYLATED A1C: CPT | Performed by: FAMILY MEDICINE

## 2024-09-17 ASSESSMENT — ENCOUNTER SYMPTOMS
ABDOMINAL DISTENTION: 0
PHOTOPHOBIA: 0
CHEST TIGHTNESS: 0
SHORTNESS OF BREATH: 0
ABDOMINAL PAIN: 0

## 2024-10-03 ENCOUNTER — OFFICE VISIT (OUTPATIENT)
Dept: CARDIOLOGY CLINIC | Age: 74
End: 2024-10-03
Payer: COMMERCIAL

## 2024-10-03 VITALS
DIASTOLIC BLOOD PRESSURE: 80 MMHG | WEIGHT: 154 LBS | BODY MASS INDEX: 30.08 KG/M2 | SYSTOLIC BLOOD PRESSURE: 128 MMHG | HEART RATE: 71 BPM | OXYGEN SATURATION: 97 %

## 2024-10-03 DIAGNOSIS — Z87.891 HISTORY OF TOBACCO USE: ICD-10-CM

## 2024-10-03 DIAGNOSIS — E66.9 OBESITY WITH BODY MASS INDEX 30 OR GREATER: ICD-10-CM

## 2024-10-03 DIAGNOSIS — R53.83 OTHER FATIGUE: Primary | ICD-10-CM

## 2024-10-03 DIAGNOSIS — I25.10 CORONARY ARTERY DISEASE INVOLVING NATIVE CORONARY ARTERY OF NATIVE HEART WITHOUT ANGINA PECTORIS: ICD-10-CM

## 2024-10-03 DIAGNOSIS — F17.201 TOBACCO DEPENDENCE IN REMISSION: ICD-10-CM

## 2024-10-03 PROBLEM — R07.9 CHEST PAIN: Status: RESOLVED | Noted: 2023-10-03 | Resolved: 2024-10-03

## 2024-10-03 PROBLEM — R94.39 ABNORMAL STRESS TEST: Status: RESOLVED | Noted: 2023-10-03 | Resolved: 2024-10-03

## 2024-10-03 PROBLEM — K85.90 ACUTE PANCREATITIS: Status: RESOLVED | Noted: 2021-11-18 | Resolved: 2024-10-03

## 2024-10-03 PROCEDURE — 1123F ACP DISCUSS/DSCN MKR DOCD: CPT | Performed by: INTERNAL MEDICINE

## 2024-10-03 PROCEDURE — 99214 OFFICE O/P EST MOD 30 MIN: CPT | Performed by: INTERNAL MEDICINE

## 2024-10-03 NOTE — PROGRESS NOTES
heart without angina pectoris                    PLAN:     As always, aggressive risk factor modification is strongly recommended. We should adhere to the JNC VIII guidelines for HTN management and the NCEP ATP III guidelines for LDL-C management.    Cardiac diet is always recommended with low fat, cholesterol, calories and sodium.    Continue medications at current doses.     NO Lopressor due to fatigue    Ok to DC plavix.     Follow up with Psych.     Patient was advised and encouraged to check blood pressure at home or at a pharmacy, maintain a logbook, and also call us back if blood pressure are above the target ranges or if it is low. Patient clearly understands and agrees to the instructions.     We will need to continue to monitor muscle and liver enzymes, BUN, CR, and electrolytes.

## 2024-10-22 ENCOUNTER — TELEPHONE (OUTPATIENT)
Dept: FAMILY MEDICINE CLINIC | Age: 74
End: 2024-10-22

## 2024-10-22 RX ORDER — CLOPIDOGREL BISULFATE 75 MG/1
75 TABLET ORAL DAILY
Qty: 90 TABLET | Refills: 3 | Status: SHIPPED | OUTPATIENT
Start: 2024-10-22

## 2024-10-22 NOTE — TELEPHONE ENCOUNTER
Comments:     Last Office Visit (last PCP visit):   9/17/2024    Next Visit Date:  Future Appointments   Date Time Provider Department Center   12/19/2024  4:00 PM Gian Spears MD [facility] DEP   4/24/2025  2:30 PM Gian Spears MD [facility] DEP   10/2/2025  3:00 PM Holiday, DO [facility]       **If hasn't been seen in over a year OR hasn't followed up according to last diabetes/ADHD visit, make appointment for patient before sending refill to provider.    Rx requested:  Requested Prescriptions     Pending Prescriptions Disp Refills    clopidogrel (PLAVIX) 75 MG tablet 90 tablet 3     Sig: Take 1 tablet by mouth daily

## 2024-10-22 NOTE — TELEPHONE ENCOUNTER
Family will be out of town when pt's refills are out and they wanted to make sure the Plavix got re-ordered so that she has it.

## 2024-12-19 ENCOUNTER — OFFICE VISIT (OUTPATIENT)
Dept: FAMILY MEDICINE CLINIC | Age: 74
End: 2024-12-19
Payer: COMMERCIAL

## 2024-12-19 VITALS
DIASTOLIC BLOOD PRESSURE: 68 MMHG | SYSTOLIC BLOOD PRESSURE: 136 MMHG | BODY MASS INDEX: 28.92 KG/M2 | HEART RATE: 67 BPM | HEIGHT: 60 IN | OXYGEN SATURATION: 97 % | WEIGHT: 147.3 LBS

## 2024-12-19 DIAGNOSIS — R53.83 OTHER FATIGUE: ICD-10-CM

## 2024-12-19 DIAGNOSIS — H61.23 EXCESSIVE CERUMEN IN BOTH EAR CANALS: Primary | ICD-10-CM

## 2024-12-19 PROCEDURE — 99213 OFFICE O/P EST LOW 20 MIN: CPT | Performed by: FAMILY MEDICINE

## 2024-12-19 PROCEDURE — 1159F MED LIST DOCD IN RCRD: CPT | Performed by: FAMILY MEDICINE

## 2024-12-19 PROCEDURE — 1123F ACP DISCUSS/DSCN MKR DOCD: CPT | Performed by: FAMILY MEDICINE

## 2024-12-19 PROCEDURE — 1160F RVW MEDS BY RX/DR IN RCRD: CPT | Performed by: FAMILY MEDICINE

## 2024-12-19 NOTE — PATIENT INSTRUCTIONS
Medicare annual wellness exam in feb 2025    Reviewed again fatigue symptom related to visual abnormalities

## 2024-12-19 NOTE — PROGRESS NOTES
Diagnosis Orders   1. Excessive cerumen in both ear canals        2. Other fatigue          Return in about 2 months (around 2/17/2025) for MAW exam due.  Patient Instructions   Medicare annual wellness exam in feb 2025    Reviewed again fatigue symptom related to visual abnormalities    Subjective:      Patient ID: Erica Perez is a 73 y.o. female who presents for:  Chief Complaint   Patient presents with    Cerumen Impaction     Check up    Fatigue     Extreme fatigue, pt wondering about getting BW or supplement. Unsure if it's caused by age. Dx w/ Bipolar    Health Maintenance     Pt says they had DEXA a couple years ago. Done through [employer]. Please advise       HPI  Returns for exam of cerumen in the ear.  She has been using hydrogen peroxide to the canals twice a week.        Current Outpatient Medications on File Prior to Visit   Medication Sig Dispense Refill    clopidogrel (PLAVIX) 75 MG tablet Take 1 tablet by mouth daily 90 tablet 3    lithium 150 MG capsule Take 1 capsule by mouth 2 times daily (with meals) 60 capsule 5    atorvastatin (LIPITOR) 40 MG tablet Take 1 tablet by mouth daily 90 tablet 3    levothyroxine (SYNTHROID) 50 MCG tablet Take 1 tablet by mouth daily 30 tablet 12    aspirin 81 MG EC tablet Take 1 tablet by mouth daily 30 tablet 12    donepezil (ARICEPT) 5 MG tablet Take 1 tablet by mouth nightly 30 tablet 12    Lumateperone Tosylate (CAPLYTA) 42 MG capsule Take 1 capsule by mouth daily 91 capsule 0    estradiol (ESTRACE VAGINAL) 0.1 MG/GM vaginal cream Apply vaginally twice weekly 1 each 3    nitroGLYCERIN (NITROSTAT) 0.4 MG SL tablet Place 1 tablet under the tongue every 5 minutes as needed for Chest pain up to max of 3 total doses. If no relief after 1 dose, call 911. 25 tablet 3     No current facility-administered medications on file prior to visit.     Past Medical History:   Diagnosis Date    Coronary artery disease involving native coronary artery of native heart without angina

## 2025-02-06 ENCOUNTER — OFFICE VISIT (OUTPATIENT)
Dept: FAMILY MEDICINE CLINIC | Age: 75
End: 2025-02-06

## 2025-02-06 VITALS
WEIGHT: 142.9 LBS | BODY MASS INDEX: 28.06 KG/M2 | HEART RATE: 48 BPM | HEIGHT: 60 IN | TEMPERATURE: 98.2 F | OXYGEN SATURATION: 96 %

## 2025-02-06 DIAGNOSIS — R05.1 ACUTE COUGH: Primary | ICD-10-CM

## 2025-02-06 LAB
INFLUENZA A ANTIBODY: NORMAL
INFLUENZA B ANTIBODY: NORMAL
Lab: NORMAL
PERFORMING INSTRUMENT: NORMAL
QC PASS/FAIL: NORMAL
S PYO AG THROAT QL: NORMAL
SARS-COV-2, POC: NORMAL

## 2025-02-06 RX ORDER — BENZONATATE 100 MG/1
100 CAPSULE ORAL 3 TIMES DAILY PRN
Qty: 30 CAPSULE | Refills: 0 | Status: SHIPPED | OUTPATIENT
Start: 2025-02-06 | End: 2025-02-16

## 2025-02-06 RX ORDER — PREDNISONE 10 MG/1
TABLET ORAL
Qty: 20 TABLET | Refills: 0 | Status: SHIPPED | OUTPATIENT
Start: 2025-02-06

## 2025-02-06 RX ORDER — AZITHROMYCIN 250 MG/1
TABLET, FILM COATED ORAL
Qty: 6 TABLET | Refills: 0 | Status: SHIPPED | OUTPATIENT
Start: 2025-02-06

## 2025-02-06 SDOH — ECONOMIC STABILITY: FOOD INSECURITY: WITHIN THE PAST 12 MONTHS, YOU WORRIED THAT YOUR FOOD WOULD RUN OUT BEFORE YOU GOT MONEY TO BUY MORE.: NEVER TRUE

## 2025-02-06 SDOH — ECONOMIC STABILITY: FOOD INSECURITY: WITHIN THE PAST 12 MONTHS, THE FOOD YOU BOUGHT JUST DIDN'T LAST AND YOU DIDN'T HAVE MONEY TO GET MORE.: NEVER TRUE

## 2025-02-06 ASSESSMENT — PATIENT HEALTH QUESTIONNAIRE - PHQ9
2. FEELING DOWN, DEPRESSED OR HOPELESS: SEVERAL DAYS
SUM OF ALL RESPONSES TO PHQ QUESTIONS 1-9: 7
8. MOVING OR SPEAKING SO SLOWLY THAT OTHER PEOPLE COULD HAVE NOTICED. OR THE OPPOSITE, BEING SO FIGETY OR RESTLESS THAT YOU HAVE BEEN MOVING AROUND A LOT MORE THAN USUAL: NOT AT ALL
SUM OF ALL RESPONSES TO PHQ QUESTIONS 1-9: 7
9. THOUGHTS THAT YOU WOULD BE BETTER OFF DEAD, OR OF HURTING YOURSELF: NOT AT ALL
6. FEELING BAD ABOUT YOURSELF - OR THAT YOU ARE A FAILURE OR HAVE LET YOURSELF OR YOUR FAMILY DOWN: NOT AT ALL
7. TROUBLE CONCENTRATING ON THINGS, SUCH AS READING THE NEWSPAPER OR WATCHING TELEVISION: NOT AT ALL
5. POOR APPETITE OR OVEREATING: NOT AT ALL
SUM OF ALL RESPONSES TO PHQ9 QUESTIONS 1 & 2: 2
4. FEELING TIRED OR HAVING LITTLE ENERGY: NEARLY EVERY DAY
SUM OF ALL RESPONSES TO PHQ QUESTIONS 1-9: 7
3. TROUBLE FALLING OR STAYING ASLEEP: MORE THAN HALF THE DAYS
SUM OF ALL RESPONSES TO PHQ QUESTIONS 1-9: 7
1. LITTLE INTEREST OR PLEASURE IN DOING THINGS: SEVERAL DAYS
10. IF YOU CHECKED OFF ANY PROBLEMS, HOW DIFFICULT HAVE THESE PROBLEMS MADE IT FOR YOU TO DO YOUR WORK, TAKE CARE OF THINGS AT HOME, OR GET ALONG WITH OTHER PEOPLE: SOMEWHAT DIFFICULT

## 2025-02-06 NOTE — PROGRESS NOTES
Diagnosis Orders   1. Acute cough  POCT rapid strep A    POCT Influenza A/B    POCT COVID-19, Antigen        Return in about 1 week (around 2/13/2025) for for review of outcome of today's recommendation.  Patient Instructions   Patient will initiate Zithromax and prednisone for bronchitis with Tessalon Perles for suppression of cough.    Subjective:      Patient ID: Erica Perez is a 74 y.o. female who presents for:  Chief Complaint   Patient presents with    Influenza     Pt thinks she has the flu, started about a week ago. Temp was never checked. She is coughing up green mucus, she has runny/stuffy nose, diarrhea, sore throat. No nausea or vomiting. No SOB.    Pt went to [place] and on the way back home the airplane was very cold and congested, she believes it contributed to the sickness. She states she caught something prior to coming back home.       HPI    Pt thinks she has the flu, started about a week ago. Temp was never checked. She is coughing up green mucus, she has runny/stuffy nose, diarrhea, sore throat. No nausea or vomiting. No SOB. Pt went to [place] and on the way back home the airplane was very cold and congested, she believes it contributed to the sickness. She states she caught something prior to coming back home.            Current Outpatient Medications on File Prior to Visit   Medication Sig Dispense Refill    clopidogrel (PLAVIX) 75 MG tablet Take 1 tablet by mouth daily 90 tablet 3    lithium 150 MG capsule Take 1 capsule by mouth 2 times daily (with meals) 60 capsule 5    atorvastatin (LIPITOR) 40 MG tablet Take 1 tablet by mouth daily 90 tablet 3    levothyroxine (SYNTHROID) 50 MCG tablet Take 1 tablet by mouth daily 30 tablet 12    aspirin 81 MG EC tablet Take 1 tablet by mouth daily 30 tablet 12    donepezil (ARICEPT) 5 MG tablet Take 1 tablet by mouth nightly 30 tablet 12    Lumateperone Tosylate (CAPLYTA) 42 MG capsule Take 1 capsule by mouth daily 91 capsule 0    estradiol (ESTRACE VAGINAL)

## 2025-02-06 NOTE — PATIENT INSTRUCTIONS
Patient will initiate Zithromax and prednisone for bronchitis with Tessalon Perles for suppression of cough.

## 2025-02-13 ENCOUNTER — OFFICE VISIT (OUTPATIENT)
Dept: FAMILY MEDICINE CLINIC | Age: 75
End: 2025-02-13
Payer: COMMERCIAL

## 2025-02-13 VITALS
HEART RATE: 56 BPM | DIASTOLIC BLOOD PRESSURE: 64 MMHG | HEIGHT: 60 IN | SYSTOLIC BLOOD PRESSURE: 118 MMHG | TEMPERATURE: 97.5 F | WEIGHT: 155.6 LBS | OXYGEN SATURATION: 98 % | BODY MASS INDEX: 30.55 KG/M2

## 2025-02-13 DIAGNOSIS — R05.1 ACUTE COUGH: Primary | ICD-10-CM

## 2025-02-13 PROCEDURE — 1126F AMNT PAIN NOTED NONE PRSNT: CPT | Performed by: FAMILY MEDICINE

## 2025-02-13 PROCEDURE — 99213 OFFICE O/P EST LOW 20 MIN: CPT | Performed by: FAMILY MEDICINE

## 2025-02-13 PROCEDURE — 1123F ACP DISCUSS/DSCN MKR DOCD: CPT | Performed by: FAMILY MEDICINE

## 2025-02-13 PROCEDURE — 1160F RVW MEDS BY RX/DR IN RCRD: CPT | Performed by: FAMILY MEDICINE

## 2025-02-13 PROCEDURE — 1159F MED LIST DOCD IN RCRD: CPT | Performed by: FAMILY MEDICINE

## 2025-02-13 RX ORDER — LITHIUM CARBONATE 300 MG/1
300 TABLET, FILM COATED, EXTENDED RELEASE ORAL NIGHTLY
COMMUNITY
Start: 2025-02-12

## 2025-02-13 RX ORDER — BENZONATATE 100 MG/1
100 CAPSULE ORAL 3 TIMES DAILY PRN
Qty: 30 CAPSULE | Refills: 0 | Status: SHIPPED | OUTPATIENT
Start: 2025-02-13 | End: 2025-02-23

## 2025-02-13 ASSESSMENT — ENCOUNTER SYMPTOMS: COUGH: 1

## 2025-02-13 NOTE — PATIENT INSTRUCTIONS
Patient will continue benzonatate as needed for cough suppression.    Significantly improved from prior exam.

## 2025-02-13 NOTE — PROGRESS NOTES
Diagnosis Orders   1. Acute cough  benzonatate (TESSALON) 100 MG capsule        Return if symptoms worsen or fail to improve.  Patient Instructions   Patient will continue benzonatate as needed for cough suppression.    Significantly improved from prior exam.    Subjective:      Patient ID: Erica Perez is a 74 y.o. female who presents for:  Chief Complaint   Patient presents with    Cough     Pt still coughing, that's all though. No other complaints/sx.       HPI  Feeling overall better.  Still coughing.  No wet cough.  Could still use to have a few more cough gelcaps        Current Outpatient Medications on File Prior to Visit   Medication Sig Dispense Refill    lithium (LITHOBID) 300 MG extended release tablet Take 1 tablet by mouth at bedtime      azithromycin (ZITHROMAX) 250 MG tablet 500mg on day 1 followed by 250mg on days 2 - 5 6 tablet 0    clopidogrel (PLAVIX) 75 MG tablet Take 1 tablet by mouth daily 90 tablet 3    atorvastatin (LIPITOR) 40 MG tablet Take 1 tablet by mouth daily 90 tablet 3    levothyroxine (SYNTHROID) 50 MCG tablet Take 1 tablet by mouth daily 30 tablet 12    aspirin 81 MG EC tablet Take 1 tablet by mouth daily 30 tablet 12    donepezil (ARICEPT) 5 MG tablet Take 1 tablet by mouth nightly 30 tablet 12    Lumateperone Tosylate (CAPLYTA) 42 MG capsule Take 1 capsule by mouth daily 91 capsule 0    estradiol (ESTRACE VAGINAL) 0.1 MG/GM vaginal cream Apply vaginally twice weekly 1 each 3    predniSONE (DELTASONE) 10 MG tablet Take 3 tabs for 3 days, then 2 tabs for 3 days, then 1 tab for 3 days, then 1/2 tab for 3 days, then stop. (Patient not taking: Reported on 2/13/2025) 20 tablet 0    lithium 150 MG capsule Take 1 capsule by mouth 2 times daily (with meals) (Patient not taking: Reported on 2/13/2025) 60 capsule 5    nitroGLYCERIN (NITROSTAT) 0.4 MG SL tablet Place 1 tablet under the tongue every 5 minutes as needed for Chest pain up to max of 3 total doses. If no relief after 1 dose,

## 2025-02-26 ENCOUNTER — OFFICE VISIT (OUTPATIENT)
Dept: FAMILY MEDICINE CLINIC | Age: 75
End: 2025-02-26
Payer: COMMERCIAL

## 2025-02-26 VITALS — WEIGHT: 156 LBS | HEIGHT: 60 IN | BODY MASS INDEX: 30.63 KG/M2

## 2025-02-26 DIAGNOSIS — Z00.00 MEDICARE ANNUAL WELLNESS VISIT, SUBSEQUENT: Primary | ICD-10-CM

## 2025-02-26 DIAGNOSIS — H91.93 BILATERAL HEARING LOSS, UNSPECIFIED HEARING LOSS TYPE: ICD-10-CM

## 2025-02-26 DIAGNOSIS — F31.81 MODERATE BIPOLAR II DISORDER, MOST RECENT EPISODE MAJOR DEPRESSIVE (HCC): ICD-10-CM

## 2025-02-26 PROBLEM — F31.32 BIPOLAR DISORDER, CURRENT EPISODE DEPRESSED, MODERATE (HCC): Status: ACTIVE | Noted: 2024-11-05

## 2025-02-26 PROCEDURE — 1160F RVW MEDS BY RX/DR IN RCRD: CPT | Performed by: FAMILY MEDICINE

## 2025-02-26 PROCEDURE — 1123F ACP DISCUSS/DSCN MKR DOCD: CPT | Performed by: FAMILY MEDICINE

## 2025-02-26 PROCEDURE — G0439 PPPS, SUBSEQ VISIT: HCPCS | Performed by: FAMILY MEDICINE

## 2025-02-26 PROCEDURE — 1126F AMNT PAIN NOTED NONE PRSNT: CPT | Performed by: FAMILY MEDICINE

## 2025-02-26 PROCEDURE — 1159F MED LIST DOCD IN RCRD: CPT | Performed by: FAMILY MEDICINE

## 2025-02-26 ASSESSMENT — LIFESTYLE VARIABLES
HOW OFTEN DO YOU HAVE A DRINK CONTAINING ALCOHOL: MONTHLY OR LESS
HOW MANY STANDARD DRINKS CONTAINING ALCOHOL DO YOU HAVE ON A TYPICAL DAY: 1 OR 2

## 2025-02-26 ASSESSMENT — PATIENT HEALTH QUESTIONNAIRE - PHQ9
10. IF YOU CHECKED OFF ANY PROBLEMS, HOW DIFFICULT HAVE THESE PROBLEMS MADE IT FOR YOU TO DO YOUR WORK, TAKE CARE OF THINGS AT HOME, OR GET ALONG WITH OTHER PEOPLE: SOMEWHAT DIFFICULT
SUM OF ALL RESPONSES TO PHQ9 QUESTIONS 1 & 2: 2
3. TROUBLE FALLING OR STAYING ASLEEP: NOT AT ALL
4. FEELING TIRED OR HAVING LITTLE ENERGY: NEARLY EVERY DAY
8. MOVING OR SPEAKING SO SLOWLY THAT OTHER PEOPLE COULD HAVE NOTICED. OR THE OPPOSITE, BEING SO FIGETY OR RESTLESS THAT YOU HAVE BEEN MOVING AROUND A LOT MORE THAN USUAL: NEARLY EVERY DAY
5. POOR APPETITE OR OVEREATING: NOT AT ALL
SUM OF ALL RESPONSES TO PHQ QUESTIONS 1-9: 8
1. LITTLE INTEREST OR PLEASURE IN DOING THINGS: SEVERAL DAYS
9. THOUGHTS THAT YOU WOULD BE BETTER OFF DEAD, OR OF HURTING YOURSELF: NOT AT ALL
SUM OF ALL RESPONSES TO PHQ QUESTIONS 1-9: 8
6. FEELING BAD ABOUT YOURSELF - OR THAT YOU ARE A FAILURE OR HAVE LET YOURSELF OR YOUR FAMILY DOWN: NOT AT ALL
7. TROUBLE CONCENTRATING ON THINGS, SUCH AS READING THE NEWSPAPER OR WATCHING TELEVISION: NOT AT ALL
2. FEELING DOWN, DEPRESSED OR HOPELESS: SEVERAL DAYS

## 2025-02-26 NOTE — PROGRESS NOTES
Medicare Annual Wellness Visit    Erica Perez is here for Medicare AWV    Assessment & Plan   Medicare annual wellness visit, subsequent  Moderate bipolar II disorder, most recent episode major depressive (HCC)  -     Lithium Level; Future  Bilateral hearing loss, unspecified hearing loss type  -     External Referral to Audiology       Return in about 1 year (around 2/26/2026) for MAW exam due.     Subjective       Patient's complete Health Risk Assessment and screening values have been reviewed and are found in Flowsheets. The following problems were reviewed today and where indicated follow up appointments were made and/or referrals ordered.    Positive Risk Factor Screenings with Interventions:    Fall Risk:  Do you feel unsteady or are you worried about falling? : (!) yes  2 or more falls in past year?: (!) yes  Fall with injury in past year?: no     Interventions:    Reviewed medications, home hazards, visual acuity, and co-morbidities that can increase risk for falls     Depression:  PHQ-2 Score: 2  PHQ-9 Total Score: 8  Total Score Interpretation: 5-9 = mild depression  Interventions:  Patient declines any further evaluation or treatment          General HRA Questions:  Select all that apply: (!) New or Increased Fatigue  Interventions Fatigue:  Patient declined any further interventions or treatment        Abnormal BMI (obese):  Body mass index is 30.47 kg/m². (!) Abnormal  Interventions:  Patient declines any further evaluation or treatment         Hearing Screen:  Do you or your family notice any trouble with your hearing that hasn't been managed with hearing aids?: (!) Yes (a little bit)    Interventions:  Referred to Audiology    Vision Screen:  Do you have difficulty driving, watching TV, or doing any of your daily activities because of your eyesight?: (!) Yes  Have you had an eye exam within the past year?: Yes  Interventions:   Patient encouraged to make appointment with their eye specialist

## 2025-03-01 DIAGNOSIS — R05.1 ACUTE COUGH: ICD-10-CM

## 2025-03-03 RX ORDER — BENZONATATE 100 MG/1
100 CAPSULE ORAL 3 TIMES DAILY PRN
Qty: 60 CAPSULE | Refills: 0 | Status: SHIPPED | OUTPATIENT
Start: 2025-03-03

## 2025-03-03 NOTE — TELEPHONE ENCOUNTER
Comments:     Last Office Visit (last PCP visit):   2/26/2025    Next Visit Date:  Future Appointments   Date Time Provider Department Center   8/25/2025 12:00 PM Gian Spears MD [facility]   10/2/2025  3:00 PM Holiday, DO [facility]   3/4/2026  4:00 PM Gian Spears MD [facility]       **If hasn't been seen in over a year OR hasn't followed up according to last diabetes/ADHD visit, make appointment for patient before sending refill to provider.    Rx requested:  Requested Prescriptions     Pending Prescriptions Disp Refills    benzonatate (TESSALON) 100 MG capsule [Pharmacy Med Name: benzonatate 100 mg capsule] 30 capsule 0     Sig: TAKE 1 CAPSULE BY MOUTH THREE TIMES DAILY AS NEEDED FOR COUGH               
WDL

## 2025-03-12 DIAGNOSIS — F31.81 MODERATE BIPOLAR II DISORDER, MOST RECENT EPISODE MAJOR DEPRESSIVE (HCC): ICD-10-CM

## 2025-03-12 LAB — LITHIUM SERPL-MCNC: 0.6 MEQ/L (ref 0.6–1.2)

## 2025-03-17 ENCOUNTER — RESULTS FOLLOW-UP (OUTPATIENT)
Dept: FAMILY MEDICINE CLINIC | Age: 75
End: 2025-03-17

## 2025-04-09 ENCOUNTER — OFFICE VISIT (OUTPATIENT)
Dept: FAMILY MEDICINE CLINIC | Age: 75
End: 2025-04-09
Payer: COMMERCIAL

## 2025-04-09 VITALS
BODY MASS INDEX: 30.74 KG/M2 | WEIGHT: 156.6 LBS | OXYGEN SATURATION: 99 % | DIASTOLIC BLOOD PRESSURE: 72 MMHG | SYSTOLIC BLOOD PRESSURE: 150 MMHG | HEIGHT: 60 IN | HEART RATE: 86 BPM

## 2025-04-09 DIAGNOSIS — R53.1 WEAKNESS: ICD-10-CM

## 2025-04-09 DIAGNOSIS — R06.02 SOB (SHORTNESS OF BREATH): Primary | ICD-10-CM

## 2025-04-09 DIAGNOSIS — R29.818 NEUROLOGIC ABNORMALITY: ICD-10-CM

## 2025-04-09 PROCEDURE — 99213 OFFICE O/P EST LOW 20 MIN: CPT | Performed by: FAMILY MEDICINE

## 2025-04-09 PROCEDURE — 1160F RVW MEDS BY RX/DR IN RCRD: CPT | Performed by: FAMILY MEDICINE

## 2025-04-09 PROCEDURE — 1123F ACP DISCUSS/DSCN MKR DOCD: CPT | Performed by: FAMILY MEDICINE

## 2025-04-09 PROCEDURE — 1125F AMNT PAIN NOTED PAIN PRSNT: CPT | Performed by: FAMILY MEDICINE

## 2025-04-09 PROCEDURE — 1159F MED LIST DOCD IN RCRD: CPT | Performed by: FAMILY MEDICINE

## 2025-04-09 RX ORDER — LITHIUM CARBONATE 300 MG/1
300 CAPSULE ORAL NIGHTLY
Qty: 1 CAPSULE | Refills: 0
Start: 2025-04-09

## 2025-04-09 ASSESSMENT — ENCOUNTER SYMPTOMS
ABDOMINAL PAIN: 0
PHOTOPHOBIA: 0
ABDOMINAL DISTENTION: 0
SHORTNESS OF BREATH: 1
CHEST TIGHTNESS: 0

## 2025-04-09 NOTE — PROGRESS NOTES
Diagnosis Orders   1. SOB (shortness of breath)        2. Weakness        3. Neurologic abnormality            See patient instructions for further assessment/plan specifics    Return for with the referal specialist.  Patient Instructions   Due to shortness of breath combined with weakness we will send patient back to cardiology for investigation has a history of coronary atherosclerotic disease with stent placement.    If coronary workup is negative we will look to establish physical therapy for strengthening and endurance    Subjective:      Patient ID: Erica Perez is a 74 y.o. female who presents for:  Chief Complaint   Patient presents with    Leg Pain       HPI  Both legs hurt.  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  No meds would be helpful.  Further testing to clarify was an option but would not have changed her treatment so they did not do    Daughter is noting general weakness in her legs that seems worse than previously and pt is \"winded\" with simple activities now.  Walking the dog used to be no problem and now makes her winded.     Chart reviewed and 2023 cardiology noted stenosis of coronary arteries with stent placement.  Complaint at that time with shortness of breath.          Current Outpatient Medications on File Prior to Visit   Medication Sig Dispense Refill    lithium (LITHOBID) 300 MG extended release tablet Take 1 tablet by mouth at bedtime      clopidogrel (PLAVIX) 75 MG tablet Take 1 tablet by mouth daily 90 tablet 3    atorvastatin (LIPITOR) 40 MG tablet Take 1 tablet by mouth daily 90 tablet 3    levothyroxine (SYNTHROID) 50 MCG tablet Take 1 tablet by mouth daily 30 tablet 12    aspirin 81 MG EC tablet Take 1 tablet by mouth daily 30 tablet 12    donepezil (ARICEPT) 5 MG tablet Take 1 tablet by mouth nightly 30 tablet 12    Lumateperone Tosylate (CAPLYTA) 42 MG capsule Take 1 capsule by mouth daily 91

## 2025-04-09 NOTE — PATIENT INSTRUCTIONS
Due to shortness of breath combined with weakness we will send patient back to cardiology for investigation has a history of coronary atherosclerotic disease with stent placement.    If coronary workup is negative we will look to establish physical therapy for strengthening and endurance

## 2025-04-15 ENCOUNTER — OFFICE VISIT (OUTPATIENT)
Dept: CARDIOLOGY CLINIC | Age: 75
End: 2025-04-15
Payer: COMMERCIAL

## 2025-04-15 VITALS
DIASTOLIC BLOOD PRESSURE: 80 MMHG | WEIGHT: 155 LBS | BODY MASS INDEX: 30.27 KG/M2 | SYSTOLIC BLOOD PRESSURE: 120 MMHG | HEART RATE: 60 BPM

## 2025-04-15 DIAGNOSIS — Z98.61 HISTORY OF PERCUTANEOUS CORONARY INTERVENTION: ICD-10-CM

## 2025-04-15 DIAGNOSIS — R53.83 OTHER FATIGUE: ICD-10-CM

## 2025-04-15 DIAGNOSIS — R06.02 SOB (SHORTNESS OF BREATH): Primary | ICD-10-CM

## 2025-04-15 DIAGNOSIS — E78.2 MIXED HYPERLIPIDEMIA: ICD-10-CM

## 2025-04-15 DIAGNOSIS — R06.02 SHORTNESS OF BREATH: ICD-10-CM

## 2025-04-15 DIAGNOSIS — Z87.891 HISTORY OF TOBACCO USE: ICD-10-CM

## 2025-04-15 PROCEDURE — 1126F AMNT PAIN NOTED NONE PRSNT: CPT | Performed by: INTERNAL MEDICINE

## 2025-04-15 PROCEDURE — 1159F MED LIST DOCD IN RCRD: CPT | Performed by: INTERNAL MEDICINE

## 2025-04-15 PROCEDURE — 1123F ACP DISCUSS/DSCN MKR DOCD: CPT | Performed by: INTERNAL MEDICINE

## 2025-04-15 PROCEDURE — 93000 ELECTROCARDIOGRAM COMPLETE: CPT | Performed by: INTERNAL MEDICINE

## 2025-04-15 PROCEDURE — 99214 OFFICE O/P EST MOD 30 MIN: CPT | Performed by: INTERNAL MEDICINE

## 2025-04-15 PROCEDURE — 1160F RVW MEDS BY RX/DR IN RCRD: CPT | Performed by: INTERNAL MEDICINE

## 2025-04-15 NOTE — PROGRESS NOTES
CC; abn stress test      8-25-23: Patient presents for initial medical evaluation. Patient is followed on a regular basis by Gian Zhu MD. no prior history of diabetes hypertension or hyperlipidemia.  No smoking.  Patient complains of chest pain, shortness of breath as well as fatigue.  She underwent treadmill stress EKG in June 2023 which was submaximal, reached 81% of my heart rate without evidence of ischemia.  Status post nuclear stress test which was inconclusive due to motion artifact/a adjacent bowel artifact cannot exclude ischemia.  No prior history of cardiac disease    9/28/23: pt seen in office today to discuss cardiac CTA results.  Results from 9/16/23 shows:  1. Significant stenosis of the proximal/mid dominant right coronary   artery with noncalcified plaque. Mid FFR<0.50 (hemodynamically   significant).   2. Mild-moderate diffuse coronary artery disease of the remaining coronary arteries without significant stenosis.   3. No hemodynamic significant stenosis of the remaining major coronary arteries by FFR assessment.   4. Coronary artery calcium score 140, 70th percentile for age, gender, and race in asymptomatic patients.     Pt denies any episodes of chest pain.  However, she reports she has been feeling very tired, fatigued, no energy as well as mild SOB with exertion.  Discussed need for LHC with possible PCI with pt and her son at length.  They are in agreement to move forward with procedure.       10/27/2023: Status post cardiac catheterization showing 99% mid RCA stenosis status post PCI with drug-eluting stents x3.  She was noted to have 50 to 60% mid LAD and circumflex disease normal left main and ejection fraction of 60%.  She complains of extreme fatigue    10-3-24: Patient states she feels more fatigued.  Thyroid function is within normal limits.  Hemoglobin is 13.   cardiac catheterization  in 10/2023 showing 99% mid RCA stenosis status post PCI with drug-eluting stents x3.

## 2025-04-17 ENCOUNTER — RESULTS FOLLOW-UP (OUTPATIENT)
Age: 75
End: 2025-04-17

## 2025-04-22 DIAGNOSIS — R06.02 SOB (SHORTNESS OF BREATH): ICD-10-CM

## 2025-04-22 LAB
ALBUMIN SERPL-MCNC: 4.5 G/DL (ref 3.5–4.6)
ALP SERPL-CCNC: 74 U/L (ref 40–130)
ALT SERPL-CCNC: 42 U/L (ref 0–33)
ANION GAP SERPL CALCULATED.3IONS-SCNC: 15 MEQ/L (ref 9–15)
AST SERPL-CCNC: 38 U/L (ref 0–35)
BILIRUB SERPL-MCNC: 0.6 MG/DL (ref 0.2–0.7)
BNP BLD-MCNC: 755 PG/ML
BUN SERPL-MCNC: 8 MG/DL (ref 8–23)
CALCIUM SERPL-MCNC: 9.4 MG/DL (ref 8.5–9.9)
CHLORIDE SERPL-SCNC: 102 MEQ/L (ref 95–107)
CO2 SERPL-SCNC: 23 MEQ/L (ref 20–31)
CREAT SERPL-MCNC: 0.68 MG/DL (ref 0.5–0.9)
D DIMER PPP FEU-MCNC: 0.43 MG/L FEU (ref 0–0.5)
ERYTHROCYTE [DISTWIDTH] IN BLOOD BY AUTOMATED COUNT: 13.4 % (ref 11.5–14.5)
GLOBULIN SER CALC-MCNC: 2.7 G/DL (ref 2.3–3.5)
GLUCOSE SERPL-MCNC: 94 MG/DL (ref 70–99)
HCT VFR BLD AUTO: 40.9 % (ref 37–47)
HGB BLD-MCNC: 13.4 G/DL (ref 12–16)
MCH RBC QN AUTO: 30.7 PG (ref 27–31.3)
MCHC RBC AUTO-ENTMCNC: 32.8 % (ref 33–37)
MCV RBC AUTO: 93.8 FL (ref 79.4–94.8)
PLATELET # BLD AUTO: 258 K/UL (ref 130–400)
POTASSIUM SERPL-SCNC: 4.1 MEQ/L (ref 3.4–4.9)
PROT SERPL-MCNC: 7.2 G/DL (ref 6.3–8)
RBC # BLD AUTO: 4.36 M/UL (ref 4.2–5.4)
SODIUM SERPL-SCNC: 140 MEQ/L (ref 135–144)
TSH SERPL-MCNC: 1.39 UIU/ML (ref 0.44–3.86)
WBC # BLD AUTO: 7.7 K/UL (ref 4.8–10.8)

## 2025-05-19 ENCOUNTER — OFFICE VISIT (OUTPATIENT)
Dept: FAMILY MEDICINE CLINIC | Age: 75
End: 2025-05-19
Payer: COMMERCIAL

## 2025-05-19 VITALS
WEIGHT: 154 LBS | SYSTOLIC BLOOD PRESSURE: 126 MMHG | HEIGHT: 60 IN | DIASTOLIC BLOOD PRESSURE: 70 MMHG | HEART RATE: 57 BPM | OXYGEN SATURATION: 98 % | BODY MASS INDEX: 30.23 KG/M2

## 2025-05-19 DIAGNOSIS — J40 BRONCHITIS: Primary | ICD-10-CM

## 2025-05-19 PROCEDURE — 99214 OFFICE O/P EST MOD 30 MIN: CPT | Performed by: FAMILY MEDICINE

## 2025-05-19 PROCEDURE — 1126F AMNT PAIN NOTED NONE PRSNT: CPT | Performed by: FAMILY MEDICINE

## 2025-05-19 PROCEDURE — 1160F RVW MEDS BY RX/DR IN RCRD: CPT | Performed by: FAMILY MEDICINE

## 2025-05-19 PROCEDURE — 1123F ACP DISCUSS/DSCN MKR DOCD: CPT | Performed by: FAMILY MEDICINE

## 2025-05-19 PROCEDURE — 1159F MED LIST DOCD IN RCRD: CPT | Performed by: FAMILY MEDICINE

## 2025-05-19 RX ORDER — LAMOTRIGINE 25 MG/1
TABLET ORAL
COMMUNITY
Start: 2025-04-15

## 2025-05-19 RX ORDER — PREDNISONE 10 MG/1
TABLET ORAL
Qty: 20 TABLET | Refills: 0 | Status: SHIPPED | OUTPATIENT
Start: 2025-05-19

## 2025-05-19 RX ORDER — LAMOTRIGINE 100 MG/1
TABLET ORAL
COMMUNITY
Start: 2025-05-08

## 2025-05-19 ASSESSMENT — ENCOUNTER SYMPTOMS
DIARRHEA: 1
COUGH: 1
VOMITING: 1
WHEEZING: 1

## 2025-05-19 NOTE — PROGRESS NOTES
Diagnosis Orders   1. Bronchitis  predniSONE (DELTASONE) 10 MG tablet          See patient instructions for further assessment/plan specifics    Return if symptoms worsen or fail to improve.  Patient Instructions   Prednisone taper for inflammatory bronchitis symptoms    Patient will complete follow-up testing with cardiology regarding cardiovascular abnormalities.    Subjective:      Patient ID: Erica Perez is a 74 y.o. female who presents for:  Chief Complaint   Patient presents with    Bronchitis     Pt's daughter states it came on pretty fast. Coughing a good amount. Taking OTC-cough medication. Daughter states she sounds pretty congested.       HPI  Separate from patient's issue of shortness of breath it is being evaluated by cardiology she is noticing onset about 5 days ago of diarrhea for 2 days followed by increasing wet cough.  No fever or chills noted.  Some wheezing noted.  Some difficulty with catching her breath around the time of coughing.  Has coughed so hard she has vomited.        Current Outpatient Medications on File Prior to Visit   Medication Sig Dispense Refill    lamoTRIgine (LAMICTAL) 100 MG tablet take 1 tablet by mouth once daily for 2 weeks, then 1 & 1/2 tablets daily for 2 weeks      lamoTRIgine (LAMICTAL) 25 MG tablet take 1 tablet by mouth daily for 2 weeks, then 2 tablets daily for 2 weeks      clopidogrel (PLAVIX) 75 MG tablet Take 1 tablet by mouth daily 90 tablet 3    atorvastatin (LIPITOR) 40 MG tablet Take 1 tablet by mouth daily 90 tablet 3    levothyroxine (SYNTHROID) 50 MCG tablet Take 1 tablet by mouth daily 30 tablet 12    aspirin 81 MG EC tablet Take 1 tablet by mouth daily 30 tablet 12    donepezil (ARICEPT) 5 MG tablet Take 1 tablet by mouth nightly 30 tablet 12    Lumateperone Tosylate (CAPLYTA) 42 MG capsule Take 1 capsule by mouth daily 91 capsule 0    estradiol (ESTRACE VAGINAL) 0.1 MG/GM vaginal cream Apply vaginally twice weekly 1 each 3    nitroGLYCERIN

## 2025-05-19 NOTE — PATIENT INSTRUCTIONS
Prednisone taper for inflammatory bronchitis symptoms    Patient will complete follow-up testing with cardiology regarding cardiovascular abnormalities.

## 2025-06-04 ENCOUNTER — HOSPITAL ENCOUNTER (OUTPATIENT)
Dept: NUCLEAR MEDICINE | Age: 75
Discharge: HOME | End: 2025-06-06
Attending: INTERNAL MEDICINE
Payer: COMMERCIAL

## 2025-06-04 ENCOUNTER — HOSPITAL ENCOUNTER (OUTPATIENT)
Age: 75
Discharge: HOME | End: 2025-06-06
Attending: INTERNAL MEDICINE
Payer: COMMERCIAL

## 2025-06-04 ENCOUNTER — HOSPITAL ENCOUNTER (OUTPATIENT)
Dept: CT IMAGING | Age: 75
Discharge: HOME | End: 2025-06-06
Attending: INTERNAL MEDICINE
Payer: COMMERCIAL

## 2025-06-04 ENCOUNTER — TELEPHONE (OUTPATIENT)
Age: 75
End: 2025-06-04

## 2025-06-04 VITALS
BODY MASS INDEX: 30.25 KG/M2 | HEIGHT: 60 IN | DIASTOLIC BLOOD PRESSURE: 70 MMHG | WEIGHT: 154.1 LBS | SYSTOLIC BLOOD PRESSURE: 126 MMHG

## 2025-06-04 DIAGNOSIS — R06.02 SHORTNESS OF BREATH: Primary | ICD-10-CM

## 2025-06-04 DIAGNOSIS — R06.02 SHORTNESS OF BREATH: ICD-10-CM

## 2025-06-04 LAB
ECHO AO ROOT DIAM: 2.8 CM
ECHO AO ROOT INDEX: 1.68 CM/M2
ECHO AV AREA PEAK VELOCITY: 2.4 CM2
ECHO AV AREA VTI: 2.1 CM2
ECHO AV AREA/BSA PEAK VELOCITY: 1.4 CM2/M2
ECHO AV AREA/BSA VTI: 1.3 CM2/M2
ECHO AV CUSP MM: 2.1 CM
ECHO AV MEAN GRADIENT: 3 MMHG
ECHO AV MEAN VELOCITY: 0.8 M/S
ECHO AV PEAK GRADIENT: 5 MMHG
ECHO AV PEAK VELOCITY: 1.2 M/S
ECHO AV VELOCITY RATIO: 0.75
ECHO AV VTI: 27.4 CM
ECHO BSA: 1.72 M2
ECHO EST RA PRESSURE: 3 MMHG
ECHO LA DIAMETER INDEX: 1.74 CM/M2
ECHO LA DIAMETER: 2.9 CM
ECHO LA TO AORTIC ROOT RATIO: 1.04
ECHO LA VOL A-L A2C: 31 ML (ref 22–52)
ECHO LA VOL A-L A4C: 83 ML (ref 22–52)
ECHO LA VOL MOD A2C: 29 ML (ref 22–52)
ECHO LA VOL MOD A4C: 70 ML (ref 22–52)
ECHO LA VOLUME AREA LENGTH: 59 ML
ECHO LA VOLUME INDEX A-L A2C: 19 ML/M2 (ref 16–34)
ECHO LA VOLUME INDEX A-L A4C: 50 ML/M2 (ref 16–34)
ECHO LA VOLUME INDEX AREA LENGTH: 35 ML/M2 (ref 16–34)
ECHO LA VOLUME INDEX MOD A2C: 17 ML/M2 (ref 16–34)
ECHO LA VOLUME INDEX MOD A4C: 42 ML/M2 (ref 16–34)
ECHO LV E' LATERAL VELOCITY: 7.95 CM/S
ECHO LV E' SEPTAL VELOCITY: 5.41 CM/S
ECHO LV EDV A2C: 89 ML
ECHO LV EDV A4C: 84 ML
ECHO LV EDV BP: 95 ML (ref 56–104)
ECHO LV EDV INDEX A4C: 50 ML/M2
ECHO LV EDV INDEX BP: 57 ML/M2
ECHO LV EDV NDEX A2C: 53 ML/M2
ECHO LV EF PHYSICIAN: 60 %
ECHO LV EJECTION FRACTION A2C: 58 %
ECHO LV EJECTION FRACTION A4C: 50 %
ECHO LV EJECTION FRACTION BIPLANE: 59 % (ref 55–100)
ECHO LV ESV A2C: 37 ML
ECHO LV ESV A4C: 42 ML
ECHO LV ESV BP: 39 ML (ref 19–49)
ECHO LV ESV INDEX A2C: 22 ML/M2
ECHO LV ESV INDEX A4C: 25 ML/M2
ECHO LV ESV INDEX BP: 23 ML/M2
ECHO LV FRACTIONAL SHORTENING: 23 % (ref 28–44)
ECHO LV INTERNAL DIMENSION DIASTOLE INDEX: 2.63 CM/M2
ECHO LV INTERNAL DIMENSION DIASTOLIC: 4.4 CM (ref 3.9–5.3)
ECHO LV INTERNAL DIMENSION SYSTOLIC INDEX: 2.04 CM/M2
ECHO LV INTERNAL DIMENSION SYSTOLIC: 3.4 CM
ECHO LV IVSD: 1.2 CM (ref 0.6–0.9)
ECHO LV IVSS: 1.4 CM
ECHO LV MASS 2D: 191.3 G (ref 67–162)
ECHO LV MASS INDEX 2D: 114.6 G/M2 (ref 43–95)
ECHO LV POSTERIOR WALL DIASTOLIC: 1.2 CM (ref 0.6–0.9)
ECHO LV POSTERIOR WALL SYSTOLIC: 1.5 CM
ECHO LV RELATIVE WALL THICKNESS RATIO: 0.55
ECHO LVOT AREA: 3.5 CM2
ECHO LVOT AV VTI INDEX: 0.64
ECHO LVOT DIAM: 2.1 CM
ECHO LVOT MEAN GRADIENT: 1 MMHG
ECHO LVOT PEAK GRADIENT: 3 MMHG
ECHO LVOT PEAK VELOCITY: 0.9 M/S
ECHO LVOT STROKE VOLUME INDEX: 36.1 ML/M2
ECHO LVOT SV: 60.2 ML
ECHO LVOT VTI: 17.4 CM
ECHO MV A VELOCITY: 0.86 M/S
ECHO MV E DECELERATION TIME (DT): 206.8 MS
ECHO MV E VELOCITY: 0.82 M/S
ECHO MV E/A RATIO: 0.95
ECHO MV E/E' LATERAL: 10.31
ECHO MV E/E' RATIO (AVERAGED): 12.74
ECHO MV E/E' SEPTAL: 15.16
ECHO MV REGURGITANT PEAK GRADIENT: 74 MMHG
ECHO MV REGURGITANT PEAK VELOCITY: 4.3 M/S
ECHO PV MAX VELOCITY: 1 M/S
ECHO PV PEAK GRADIENT: 4 MMHG
ECHO RIGHT VENTRICULAR SYSTOLIC PRESSURE (RVSP): 14 MMHG
ECHO RV INTERNAL DIMENSION: 2.9 CM
ECHO RV TAPSE: 1.9 CM (ref 1.7–?)
ECHO RVOT PEAK GRADIENT: 3 MMHG
ECHO RVOT PEAK VELOCITY: 0.9 M/S
ECHO TV REGURGITANT MAX VELOCITY: 1.69 M/S
ECHO TV REGURGITANT PEAK GRADIENT: 11 MMHG
NUC STRESS EJECTION FRACTION: 64 %
PERFORMED ON: NORMAL
POC CREATININE: 0.7 MG/DL (ref 0.6–1.2)
POC SAMPLE TYPE: NORMAL
STRESS BASELINE DIAS BP: 135 MMHG
STRESS BASELINE HR: 71 BPM
STRESS BASELINE ST DEPRESSION: 0 MM
STRESS BASELINE SYS BP: 146 MMHG
STRESS ESTIMATED WORKLOAD: 1 METS
STRESS PEAK DIAS BP: 82 MMHG
STRESS PEAK SYS BP: 150 MMHG
STRESS PERCENT HR ACHIEVED: 62 %
STRESS POST PEAK HR: 90 BPM
STRESS RATE PRESSURE PRODUCT: NORMAL BPM*MMHG
STRESS ST DEPRESSION: 0 MM
STRESS TARGET HR: 145 BPM
TID: 0.97

## 2025-06-04 PROCEDURE — 6360000002 HC RX W HCPCS: Performed by: INTERNAL MEDICINE

## 2025-06-04 PROCEDURE — 93017 CV STRESS TEST TRACING ONLY: CPT

## 2025-06-04 PROCEDURE — 93018 CV STRESS TEST I&R ONLY: CPT | Performed by: INTERNAL MEDICINE

## 2025-06-04 PROCEDURE — 71275 CT ANGIOGRAPHY CHEST: CPT

## 2025-06-04 PROCEDURE — 6360000004 HC RX CONTRAST MEDICATION: Performed by: INTERNAL MEDICINE

## 2025-06-04 PROCEDURE — 93306 TTE W/DOPPLER COMPLETE: CPT | Performed by: INTERNAL MEDICINE

## 2025-06-04 PROCEDURE — 2500000003 HC RX 250 WO HCPCS: Performed by: INTERNAL MEDICINE

## 2025-06-04 PROCEDURE — A9502 TC99M TETROFOSMIN: HCPCS | Performed by: INTERNAL MEDICINE

## 2025-06-04 PROCEDURE — 3430000000 HC RX DIAGNOSTIC RADIOPHARMACEUTICAL: Performed by: INTERNAL MEDICINE

## 2025-06-04 PROCEDURE — 78452 HT MUSCLE IMAGE SPECT MULT: CPT

## 2025-06-04 PROCEDURE — 93306 TTE W/DOPPLER COMPLETE: CPT

## 2025-06-04 RX ORDER — IOPAMIDOL 755 MG/ML
75 INJECTION, SOLUTION INTRAVASCULAR
Status: COMPLETED | OUTPATIENT
Start: 2025-06-04 | End: 2025-06-04

## 2025-06-04 RX ORDER — REGADENOSON 0.08 MG/ML
0.4 INJECTION, SOLUTION INTRAVENOUS
Status: COMPLETED | OUTPATIENT
Start: 2025-06-04 | End: 2025-06-04

## 2025-06-04 RX ORDER — SODIUM CHLORIDE 0.9 % (FLUSH) 0.9 %
10 SYRINGE (ML) INJECTION PRN
Status: COMPLETED | OUTPATIENT
Start: 2025-06-04 | End: 2025-06-04

## 2025-06-04 RX ADMIN — REGADENOSON 0.4 MG: 0.08 INJECTION, SOLUTION INTRAVENOUS at 11:42

## 2025-06-04 RX ADMIN — TETROFOSMIN 35.8 MILLICURIE: 1.38 INJECTION, POWDER, LYOPHILIZED, FOR SOLUTION INTRAVENOUS at 11:42

## 2025-06-04 RX ADMIN — IOPAMIDOL 75 ML: 755 INJECTION, SOLUTION INTRAVENOUS at 16:22

## 2025-06-04 RX ADMIN — Medication 10 ML: at 11:41

## 2025-06-04 RX ADMIN — Medication 10 ML: at 10:11

## 2025-06-04 RX ADMIN — TETROFOSMIN 11.7 MILLICURIE: 1.38 INJECTION, POWDER, LYOPHILIZED, FOR SOLUTION INTRAVENOUS at 10:11

## 2025-06-04 RX ADMIN — Medication 10 ML: at 11:43

## 2025-06-04 NOTE — TELEPHONE ENCOUNTER
Per DR Bae echo showing abnormalities and needs a stat chest CT. Spoke with daughter and provided number to scheduling dept. Calling to schedule

## 2025-06-04 NOTE — TELEPHONE ENCOUNTER
----- Message from Dr. Anupam Bae DO sent at 6/4/2025  2:56 PM EDT -----  Regarding: abn ECHo  Abn echo concern for pulmonary embolism  Please obtain stat CTA of chest to rule out PE.     Electronically signed by Anupam Bae DO on 6/4/2025 at 2:56 PM

## 2025-06-10 DIAGNOSIS — E03.8 OTHER SPECIFIED HYPOTHYROIDISM: ICD-10-CM

## 2025-06-10 DIAGNOSIS — R41.3 MEMORY LOSS: ICD-10-CM

## 2025-06-10 NOTE — TELEPHONE ENCOUNTER
Comments:     Last Office Visit (last PCP visit):   5/19/2025    Next Visit Date:  Future Appointments   Date Time Provider Department Center   6/11/2025 11:00 AM Saúl Schreiber APRN - CNP [facility]   8/25/2025 12:00 PM Gian Spears MD [facility]   10/2/2025  3:00 PM HolidayAnupam DO [facility]   3/4/2026  4:00 PM Gian Spears MD [facility]   4/21/2026 10:30 AM Holiday, Anupam DANIELS DO [facility]       **If hasn't been seen in over a year OR hasn't followed up according to last diabetes/ADHD visit, make appointment for patient before sending refill to provider.    Rx requested:  Requested Prescriptions     Pending Prescriptions Disp Refills    ASPIRIN LOW DOSE 81 MG EC tablet [Pharmacy Med Name: Aspirin Low Dose 81mg Enteric Coated Tablet] 30 tablet 11     Sig: Take 1 tablet by mouth daily.    donepezil (ARICEPT) 5 MG tablet [Pharmacy Med Name: Donepezil Hydrochloride 5mg Tablet] 30 tablet 11     Sig: Take 1 tablet by mouth nightly.    atorvastatin (LIPITOR) 40 MG tablet [Pharmacy Med Name: Atorvastatin Calcium 40mg Tablet] 90 tablet 2     Sig: Take 1 tablet by mouth daily.    levothyroxine (SYNTHROID) 50 MCG tablet [Pharmacy Med Name: Levothyroxine Sodium 50mcg Tablet] 30 tablet 11     Sig: Take 1 tablet by mouth daily.           +

## 2025-06-11 ENCOUNTER — OFFICE VISIT (OUTPATIENT)
Age: 75
End: 2025-06-11
Payer: COMMERCIAL

## 2025-06-11 VITALS
SYSTOLIC BLOOD PRESSURE: 138 MMHG | BODY MASS INDEX: 30.78 KG/M2 | OXYGEN SATURATION: 98 % | HEART RATE: 68 BPM | DIASTOLIC BLOOD PRESSURE: 80 MMHG | WEIGHT: 157.6 LBS

## 2025-06-11 DIAGNOSIS — Z71.2 ENCOUNTER TO DISCUSS TEST RESULTS: Primary | ICD-10-CM

## 2025-06-11 DIAGNOSIS — R06.02 SOB (SHORTNESS OF BREATH): ICD-10-CM

## 2025-06-11 PROCEDURE — 99213 OFFICE O/P EST LOW 20 MIN: CPT

## 2025-06-11 PROCEDURE — 1123F ACP DISCUSS/DSCN MKR DOCD: CPT

## 2025-06-11 PROCEDURE — 1159F MED LIST DOCD IN RCRD: CPT

## 2025-06-11 PROCEDURE — 1126F AMNT PAIN NOTED NONE PRSNT: CPT

## 2025-06-11 RX ORDER — ASPIRIN 81 MG/1
81 TABLET, COATED ORAL DAILY
Qty: 30 TABLET | Refills: 11 | Status: SHIPPED | OUTPATIENT
Start: 2025-06-11

## 2025-06-11 RX ORDER — DONEPEZIL HYDROCHLORIDE 5 MG/1
5 TABLET, FILM COATED ORAL NIGHTLY
Qty: 30 TABLET | Refills: 11 | Status: SHIPPED | OUTPATIENT
Start: 2025-06-11

## 2025-06-11 RX ORDER — LEVOTHYROXINE SODIUM 50 UG/1
50 TABLET ORAL DAILY
Qty: 30 TABLET | Refills: 11 | Status: SHIPPED | OUTPATIENT
Start: 2025-06-11

## 2025-06-11 RX ORDER — ATORVASTATIN CALCIUM 40 MG/1
40 TABLET, FILM COATED ORAL DAILY
Qty: 30 TABLET | Refills: 11 | Status: SHIPPED | OUTPATIENT
Start: 2025-06-11

## 2025-06-11 NOTE — PROGRESS NOTES
aspirin 81 MG EC tablet Take 1 tablet by mouth daily 30 tablet 12    donepezil (ARICEPT) 5 MG tablet Take 1 tablet by mouth nightly 30 tablet 12    Lumateperone Tosylate (CAPLYTA) 42 MG capsule Take 1 capsule by mouth daily 91 capsule 0    estradiol (ESTRACE VAGINAL) 0.1 MG/GM vaginal cream Apply vaginally twice weekly 1 each 3    nitroGLYCERIN (NITROSTAT) 0.4 MG SL tablet Place 1 tablet under the tongue every 5 minutes as needed for Chest pain up to max of 3 total doses. If no relief after 1 dose, call 911. 25 tablet 3    predniSONE (DELTASONE) 10 MG tablet Take 3 tabs for 3 days, then 2 tabs for 3 days, then 1 tab for 3 days, then 1/2 tab for 3 days, then stop. 20 tablet 0     No current facility-administered medications for this visit.       Patient has no known allergies.    Review of Systems:  General ROS: negative  Psychological ROS: negative  Hematological and Lymphatic ROS: No history of blood clots or bleeding disorder.   Respiratory ROS: no cough, shortness of breath, or wheezing  Cardiovascular ROS: positive for - chest pain and dyspnea on exertion  Gastrointestinal ROS: no abdominal pain, change in bowel habits, or black or bloody stools  Genito-Urinary ROS: no dysuria, trouble voiding, or hematuria  Musculoskeletal ROS: negative  Neurological ROS: no TIA or stroke symptoms  Dermatological ROS: negative    VITALS:  Blood pressure 138/80, pulse 68, weight 71.5 kg (157 lb 9.6 oz), SpO2 98%.  Body mass index is 30.78 kg/m².    Physical Examination:  General appearance - alert, well appearing, and in no distress  Mental status - alert, oriented to person, place, and time  Neck - Neck is supple, no JVD or carotid bruits.  No thyromegaly or adenopathy.   Chest - clear to auscultation, no wheezes, rales or rhonchi, symmetric air entry  Heart - normal rate, regular rhythm, normal S1, S2, no murmurs, rubs, clicks or gallops  Abdomen - soft, nontender, nondistended, no masses or organomegaly  Neurological - alert,

## 2025-06-19 ENCOUNTER — OFFICE VISIT (OUTPATIENT)
Age: 75
End: 2025-06-19
Payer: COMMERCIAL

## 2025-06-19 VITALS
WEIGHT: 157 LBS | TEMPERATURE: 97.6 F | OXYGEN SATURATION: 97 % | BODY MASS INDEX: 30.82 KG/M2 | DIASTOLIC BLOOD PRESSURE: 74 MMHG | HEART RATE: 63 BPM | SYSTOLIC BLOOD PRESSURE: 124 MMHG | RESPIRATION RATE: 17 BRPM | HEIGHT: 60 IN

## 2025-06-19 DIAGNOSIS — G47.30 SLEEP-DISORDERED BREATHING: Primary | ICD-10-CM

## 2025-06-19 DIAGNOSIS — R06.02 SHORTNESS OF BREATH: ICD-10-CM

## 2025-06-19 PROCEDURE — 99203 OFFICE O/P NEW LOW 30 MIN: CPT | Performed by: INTERNAL MEDICINE

## 2025-06-19 PROCEDURE — 1159F MED LIST DOCD IN RCRD: CPT | Performed by: INTERNAL MEDICINE

## 2025-06-19 PROCEDURE — 1123F ACP DISCUSS/DSCN MKR DOCD: CPT | Performed by: INTERNAL MEDICINE

## 2025-06-19 RX ORDER — FLUTICASONE FUROATE AND VILANTEROL 100; 25 UG/1; UG/1
1 POWDER RESPIRATORY (INHALATION) DAILY
Qty: 1 EACH | Refills: 3 | Status: SHIPPED | OUTPATIENT
Start: 2025-06-19

## 2025-06-19 NOTE — PROGRESS NOTES
NEW PATIENT VISIT-PULMONARY/SLEEP    6/19/2025     REFERRING PHYSICIAN:  Gian Spears MD     REASON FOR REFERRAL:  shortness of breath    HPI:     Erica Perez is a 74 y.o. female who was referred to sleep and pulmonary clinic for evaluation.   Has been evaluated by cardiology due to shortness of breath with activities.  Workup has been unremarkable overall.  Underwent CT chest to rule out pulm emboli.  Reviewed CT chest personally and interpreted the results independently.  There is no evidence of pulm emboli.  There is no significant parenchymal disease.  Echocardiogram was unremarkable as well.  Shortness of breath usually walking on flat level or going up the stairs.  This is associated with cough.  Some cough productive of clear sputum.  Has intermittent wheezing as well.  Smoked for at least 10 years when she was younger.  She has significant reflux as well.  She is not on maintenance treatment.    She snores but unclear about witnessed apneas.  She startles to be during the day and she does appear today.            Past Medical History   Past Medical History:   Diagnosis Date    Coronary artery disease involving native coronary artery of native heart without angina pectoris 10/27/2023       Past Surgical History  Past Surgical History:   Procedure Laterality Date    CARDIAC PROCEDURE N/A 10/13/2023    Left heart cath / coronary angiography possible percutaneous coronary intervention performed by Anupam Bae DO at [facility] CARDIAC CATH LAB    CARDIAC PROCEDURE N/A 10/13/2023    Percutaneous coronary intervention performed by Anupam Bae DO at [facility] CARDIAC CATH LAB    CHOLECYSTECTOMY      HYSTERECTOMY (CERVIX STATUS UNKNOWN)      OVARY REMOVAL         Allergies  No Known Allergies    Medications  Current Outpatient Medications   Medication Sig Dispense Refill    ASPIRIN LOW DOSE 81 MG EC tablet Take 1 tablet by mouth daily. 30 tablet 11    donepezil (ARICEPT) 5 MG tablet Take 1 tablet by

## 2025-07-11 PROCEDURE — 99284 EMERGENCY DEPT VISIT MOD MDM: CPT

## 2025-07-12 ENCOUNTER — APPOINTMENT (OUTPATIENT)
Dept: GENERAL RADIOLOGY | Age: 75
End: 2025-07-12
Payer: COMMERCIAL

## 2025-07-12 ENCOUNTER — HOSPITAL ENCOUNTER (EMERGENCY)
Age: 75
Discharge: HOME | End: 2025-07-12
Attending: EMERGENCY MEDICINE
Payer: COMMERCIAL

## 2025-07-12 VITALS
SYSTOLIC BLOOD PRESSURE: 125 MMHG | HEIGHT: 60 IN | OXYGEN SATURATION: 97 % | BODY MASS INDEX: 30.43 KG/M2 | WEIGHT: 155 LBS | DIASTOLIC BLOOD PRESSURE: 75 MMHG | TEMPERATURE: 97.8 F | HEART RATE: 74 BPM | RESPIRATION RATE: 16 BRPM

## 2025-07-12 DIAGNOSIS — S82.401A CLOSED FRACTURE OF SHAFT OF RIGHT FIBULA, UNSPECIFIED FRACTURE MORPHOLOGY, INITIAL ENCOUNTER: Primary | ICD-10-CM

## 2025-07-12 PROCEDURE — 73560 X-RAY EXAM OF KNEE 1 OR 2: CPT

## 2025-07-12 PROCEDURE — 73610 X-RAY EXAM OF ANKLE: CPT

## 2025-07-12 PROCEDURE — 6360000002 HC RX W HCPCS: Performed by: EMERGENCY MEDICINE

## 2025-07-12 PROCEDURE — 96372 THER/PROPH/DIAG INJ SC/IM: CPT

## 2025-07-12 RX ORDER — KETOROLAC TROMETHAMINE 10 MG/1
10 TABLET, FILM COATED ORAL EVERY 6 HOURS PRN
Qty: 20 TABLET | Refills: 0 | Status: SHIPPED | OUTPATIENT
Start: 2025-07-12

## 2025-07-12 RX ORDER — KETOROLAC TROMETHAMINE 30 MG/ML
30 INJECTION, SOLUTION INTRAMUSCULAR; INTRAVENOUS ONCE
Status: COMPLETED | OUTPATIENT
Start: 2025-07-12 | End: 2025-07-12

## 2025-07-12 RX ORDER — OXYCODONE AND ACETAMINOPHEN 5; 325 MG/1; MG/1
1 TABLET ORAL EVERY 6 HOURS PRN
Qty: 12 TABLET | Refills: 0 | Status: SHIPPED | OUTPATIENT
Start: 2025-07-12 | End: 2025-07-12

## 2025-07-12 RX ADMIN — KETOROLAC TROMETHAMINE 30 MG: 30 INJECTION, SOLUTION INTRAMUSCULAR at 02:15

## 2025-07-12 ASSESSMENT — ENCOUNTER SYMPTOMS
VOMITING: 0
EYE REDNESS: 0
SORE THROAT: 0
SHORTNESS OF BREATH: 0
BACK PAIN: 0
EYE DISCHARGE: 0
ABDOMINAL PAIN: 0
COUGH: 0
NAUSEA: 0

## 2025-07-12 ASSESSMENT — PAIN SCALES - GENERAL: PAINLEVEL_OUTOF10: 8

## 2025-07-12 ASSESSMENT — PAIN - FUNCTIONAL ASSESSMENT: PAIN_FUNCTIONAL_ASSESSMENT: 0-10

## 2025-07-12 NOTE — ED PROVIDER NOTES
[facility] EMERGENCY DEPARTMENT  EMERGENCY DEPARTMENT ENCOUNTER      Pt Name: Erica Perez  MRN: [id number]  Birthdate 1950  Date of evaluation: 7/11/2025  Provider: JAMILA CALDERON DO    CHIEF COMPLAINT       Chief Complaint   Patient presents with    Ankle Injury     Tripped getting out of bed to go to the bathroom - twisted her left ankle - bruising and swelling to left knee - denies head injury - no LOC         HISTORY OF PRESENT ILLNESS   (Location/Symptom, Timing/Onset, Context/Setting, Quality, Duration, Modifying Factors, Severity)  Note limiting factors.   Erica Perez is a 74 y.o. female who presents to the emergency department with left ankle and left knee pain.     The history is provided by the patient and a caregiver.   Ankle Problem  Location:  Ankle  Time since incident:  1 day  Injury: yes    Ankle location:  L ankle  Pain details:     Quality:  Aching    Radiates to:  Does not radiate    Severity:  Moderate    Onset quality:  Sudden    Timing:  Constant    Progression:  Unchanged  Chronicity:  New  Prior injury to area:  Unable to specify  Relieved by:  Ice and immobilization  Worsened by:  Bearing weight and exercise  Ineffective treatments:  None tried  Associated symptoms: decreased ROM and swelling    Associated symptoms: no back pain, no fever and no neck pain        Nursing Notes were reviewed.    REVIEW OF SYSTEMS    (2-9 systems for level 4, 10 or more for level 5)     Review of Systems   Constitutional:  Negative for chills and fever.   HENT:  Negative for ear pain and sore throat.    Eyes:  Negative for discharge and redness.   Respiratory:  Negative for cough and shortness of breath.    Cardiovascular:  Negative for chest pain and palpitations.   Gastrointestinal:  Negative for abdominal pain, nausea and vomiting.   Genitourinary:  Negative for difficulty urinating and dysuria.   Musculoskeletal:  Positive for joint swelling. Negative for back pain and neck pain.   Skin:  Negative

## 2025-07-12 NOTE — ED NOTES
Patient and family declined ambulance transport home upon discharge. Family reports that they will have additional family members at home to assist patient from car to house. 30 mg Toradol IM admin prior to discharge. Pt rates pain 1/10 at this time.   MSPs x4 - skin color remains warm, pink and dry to affected extremity.

## 2025-07-14 ENCOUNTER — APPOINTMENT (OUTPATIENT)
Dept: ORTHOPEDIC SURGERY | Facility: CLINIC | Age: 75
End: 2025-07-14
Payer: COMMERCIAL

## 2025-07-14 ENCOUNTER — OFFICE VISIT (OUTPATIENT)
Dept: ORTHOPEDIC SURGERY | Age: 75
End: 2025-07-14
Payer: COMMERCIAL

## 2025-07-14 VITALS
OXYGEN SATURATION: 96 % | TEMPERATURE: 97.3 F | HEART RATE: 53 BPM | HEIGHT: 60 IN | WEIGHT: 155 LBS | BODY MASS INDEX: 30.43 KG/M2

## 2025-07-14 DIAGNOSIS — S82.832K CLOSED AVULSION FRACTURE OF DISTAL END OF LEFT FIBULA WITH NONUNION, SUBSEQUENT ENCOUNTER: ICD-10-CM

## 2025-07-14 DIAGNOSIS — S82.832A CLOSED FRACTURE OF DISTAL END OF LEFT FIBULA, UNSPECIFIED FRACTURE MORPHOLOGY, INITIAL ENCOUNTER: Primary | ICD-10-CM

## 2025-07-14 PROCEDURE — 1125F AMNT PAIN NOTED PAIN PRSNT: CPT | Performed by: STUDENT IN AN ORGANIZED HEALTH CARE EDUCATION/TRAINING PROGRAM

## 2025-07-14 PROCEDURE — L4360 PNEUMAT WALKING BOOT PRE CST: HCPCS | Performed by: STUDENT IN AN ORGANIZED HEALTH CARE EDUCATION/TRAINING PROGRAM

## 2025-07-14 PROCEDURE — 1159F MED LIST DOCD IN RCRD: CPT | Performed by: STUDENT IN AN ORGANIZED HEALTH CARE EDUCATION/TRAINING PROGRAM

## 2025-07-14 PROCEDURE — 1123F ACP DISCUSS/DSCN MKR DOCD: CPT | Performed by: STUDENT IN AN ORGANIZED HEALTH CARE EDUCATION/TRAINING PROGRAM

## 2025-07-14 PROCEDURE — 1160F RVW MEDS BY RX/DR IN RCRD: CPT | Performed by: STUDENT IN AN ORGANIZED HEALTH CARE EDUCATION/TRAINING PROGRAM

## 2025-07-14 PROCEDURE — 99204 OFFICE O/P NEW MOD 45 MIN: CPT | Performed by: STUDENT IN AN ORGANIZED HEALTH CARE EDUCATION/TRAINING PROGRAM

## 2025-07-14 ASSESSMENT — ENCOUNTER SYMPTOMS
VOMITING: 0
SHORTNESS OF BREATH: 0
NAUSEA: 0
COLOR CHANGE: 0

## 2025-07-14 NOTE — PROGRESS NOTES
+2              Radiographs and Laboratory Studies:     Diagnostic Imaging Studies:    Xray Result (most recent):         L Ankle Xray Results for orders placed during the hospital encounter of 07/12/25    XR ANKLE LEFT (MIN 3 VIEWS)    Narrative  EXAMINATION:  THREE XRAY VIEWS OF THE LEFT ANKLE    7/12/2025 12:32 am    COMPARISON:  None.    HISTORY:  ORDERING SYSTEM PROVIDED HISTORY: fall/ankle injury  TECHNOLOGIST PROVIDED HISTORY:  Reason for exam:->fall/ankle injury  What reading provider will be dictating this exam?->CRC    FINDINGS:  Fracture distal shaft of the fibula..  Normal alignment of the ankle mortise.  Plantar calcaneal spur..  No evidence of joint effusion. No focal soft tissue  abnormality.    Impression  Fracture distal shaft of the fibula.       Laboratory Studies:   Lab Results   Component Value Date    WBC 7.7 04/22/2025    HGB 13.4 04/22/2025    HCT 40.9 04/22/2025    MCV 93.8 04/22/2025     04/22/2025     No results found for: \"SEDRATE\"  No results found for: \"CRP\"    Assessment:       Diagnosis Orders   1. Closed fracture of distal end of left fibula, unspecified fracture morphology, initial encounter  Pneumat walking boot pre cst      2. 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  Plain films reviewed from the emergency department which are nonweightbearing personally reviewed and interpreted with the patient and her family demonstrate a nondisplaced fracture of the distal fibula with evidence of a distal fibular avulsion nonunion.  The patient is accompanied by her daughter and son-in-law throughout the encounter who provides key details of the HPI as well as relying critical information regarding treatment to the patient. Conservative and surgical treatment options

## 2025-07-15 ENCOUNTER — CARE COORDINATION (OUTPATIENT)
Dept: CARE COORDINATION | Age: 75
End: 2025-07-15

## 2025-07-15 ENCOUNTER — TELEPHONE (OUTPATIENT)
Dept: FAMILY MEDICINE CLINIC | Age: 75
End: 2025-07-15

## 2025-07-15 NOTE — CARE COORDINATION
Patients daughter Ilene reached out to this ACM regarding patient having fall and possibly needing surgery next week.    Ilene, daughter, curious about rehab, assisted living/senior living, and or in home care.  This ACM discussed w/ Ilene options for rehab after surgery if option.  Discussed calling home instead or other in home care businesses for advice.  Discussed reaching out to elder  to discuss future needs/assets due to needing assisted living/senior living/SNF care in future.    Patient lives alone and has balance issues and fracture in left lower leg.    Ilene thanked this ACM for the advice.  PCP gave this ACM # to Ilene.    Did not enroll in care coordination

## 2025-07-15 NOTE — TELEPHONE ENCOUNTER
Pt's daughter called asking for the care coordinator's name and number. Lidia Holcomb [phone]

## 2025-07-21 ENCOUNTER — OFFICE VISIT (OUTPATIENT)
Dept: ORTHOPEDIC SURGERY | Age: 75
End: 2025-07-21
Payer: COMMERCIAL

## 2025-07-21 ENCOUNTER — HOSPITAL ENCOUNTER (OUTPATIENT)
Dept: GENERAL RADIOLOGY | Age: 75
Discharge: HOME | End: 2025-07-23
Payer: COMMERCIAL

## 2025-07-21 ENCOUNTER — HOSPITAL ENCOUNTER (OUTPATIENT)
Age: 75
Discharge: HOME | End: 2025-07-23
Payer: COMMERCIAL

## 2025-07-21 DIAGNOSIS — S82.832A CLOSED FRACTURE OF DISTAL END OF LEFT FIBULA, UNSPECIFIED FRACTURE MORPHOLOGY, INITIAL ENCOUNTER: ICD-10-CM

## 2025-07-21 DIAGNOSIS — S82.832A CLOSED FRACTURE OF DISTAL END OF LEFT FIBULA, UNSPECIFIED FRACTURE MORPHOLOGY, INITIAL ENCOUNTER: Primary | ICD-10-CM

## 2025-07-21 PROCEDURE — 1123F ACP DISCUSS/DSCN MKR DOCD: CPT | Performed by: STUDENT IN AN ORGANIZED HEALTH CARE EDUCATION/TRAINING PROGRAM

## 2025-07-21 PROCEDURE — 99214 OFFICE O/P EST MOD 30 MIN: CPT | Performed by: STUDENT IN AN ORGANIZED HEALTH CARE EDUCATION/TRAINING PROGRAM

## 2025-07-21 PROCEDURE — 73610 X-RAY EXAM OF ANKLE: CPT

## 2025-07-21 ASSESSMENT — ENCOUNTER SYMPTOMS
COLOR CHANGE: 0
NAUSEA: 0
VOMITING: 0
SHORTNESS OF BREATH: 0

## 2025-07-31 ENCOUNTER — HOSPITAL ENCOUNTER (OUTPATIENT)
Dept: GENERAL RADIOLOGY | Age: 75
Discharge: HOME | End: 2025-08-02
Payer: COMMERCIAL

## 2025-07-31 ENCOUNTER — HOSPITAL ENCOUNTER (OUTPATIENT)
Age: 75
Discharge: HOME | End: 2025-08-02
Payer: COMMERCIAL

## 2025-07-31 ENCOUNTER — OFFICE VISIT (OUTPATIENT)
Dept: ORTHOPEDIC SURGERY | Age: 75
End: 2025-07-31
Payer: COMMERCIAL

## 2025-07-31 VITALS
TEMPERATURE: 98.7 F | HEART RATE: 71 BPM | OXYGEN SATURATION: 97 % | WEIGHT: 155 LBS | HEIGHT: 60 IN | BODY MASS INDEX: 30.43 KG/M2

## 2025-07-31 DIAGNOSIS — S82.832A CLOSED FRACTURE OF DISTAL END OF LEFT FIBULA, UNSPECIFIED FRACTURE MORPHOLOGY, INITIAL ENCOUNTER: ICD-10-CM

## 2025-07-31 DIAGNOSIS — S82.832A CLOSED FRACTURE OF DISTAL END OF LEFT FIBULA, UNSPECIFIED FRACTURE MORPHOLOGY, INITIAL ENCOUNTER: Primary | ICD-10-CM

## 2025-07-31 PROCEDURE — 73610 X-RAY EXAM OF ANKLE: CPT

## 2025-07-31 PROCEDURE — 1123F ACP DISCUSS/DSCN MKR DOCD: CPT | Performed by: STUDENT IN AN ORGANIZED HEALTH CARE EDUCATION/TRAINING PROGRAM

## 2025-07-31 PROCEDURE — 99213 OFFICE O/P EST LOW 20 MIN: CPT | Performed by: STUDENT IN AN ORGANIZED HEALTH CARE EDUCATION/TRAINING PROGRAM

## 2025-07-31 PROCEDURE — 1159F MED LIST DOCD IN RCRD: CPT | Performed by: STUDENT IN AN ORGANIZED HEALTH CARE EDUCATION/TRAINING PROGRAM

## 2025-07-31 PROCEDURE — 1126F AMNT PAIN NOTED NONE PRSNT: CPT | Performed by: STUDENT IN AN ORGANIZED HEALTH CARE EDUCATION/TRAINING PROGRAM

## 2025-07-31 RX ORDER — CLOPIDOGREL BISULFATE 75 MG/1
75 TABLET ORAL DAILY
Qty: 90 TABLET | Refills: 3 | Status: SHIPPED | OUTPATIENT
Start: 2025-07-31

## 2025-07-31 ASSESSMENT — ENCOUNTER SYMPTOMS
NAUSEA: 0
VOMITING: 0
COLOR CHANGE: 0
SHORTNESS OF BREATH: 0

## 2025-07-31 NOTE — TELEPHONE ENCOUNTER
Comments:     Last Office Visit (last PCP visit):   5/19/2025    Next Visit Date:  Future Appointments   Date Time Provider Department Center   8/20/2025  9:45 AM Dwayne Moura DO [facility]   8/25/2025 12:00 PM Gian Spears MD [facility]P   3/4/2026  4:00 PM Gian Spears MD [facility]   4/30/2026  1:15 PM Anupam Bae DO [facility]       **If hasn't been seen in over a year OR hasn't followed up according to last diabetes/ADHD visit, make appointment for patient before sending refill to provider.    Rx requested:  Requested Prescriptions     Pending Prescriptions Disp Refills    clopidogrel (PLAVIX) 75 MG tablet 90 tablet 3     Sig: Take 1 tablet by mouth daily

## 2025-07-31 NOTE — PROGRESS NOTES
Subjective:      Patient ID: Erica Perez is a 74 y.o. female who presents today for:  Chief Complaint   Patient presents with    Follow-up     Patient presents for a follow up on her ankle fracture.       HPI  74-year-old female returns for follow-up evaluation regarding her left ankle fracture.  She has not been wearing the walking boot for approximately 3 weeks with limited weightbearing.  Overall she notes improvement in symptoms while wearing the walking boot.  She has not attempted to ambulate out of the walking boot.    Past Medical History:   Diagnosis Date    Coronary artery disease involving native coronary artery of native heart without angina pectoris 10/27/2023      Past Surgical History:   Procedure Laterality Date    CARDIAC PROCEDURE N/A 10/13/2023    Left heart cath / coronary angiography possible percutaneous coronary intervention performed by Anupam Bae DO at [facility]ARDIAC CATH LAB    CARDIAC PROCEDURE N/A 10/13/2023    Percutaneous coronary intervention performed by Anupam Bae DO at [facility] CATH [facility]    CHOLECYSTECTOMY      HYSTERECTOMY (CERVIX STATUS UNKNOWN)      OVARY REMOVAL       Social History     Socioeconomic History    Marital status:      Spouse name: Not on file    Number of children: Not on file    Years of education: Not on file    Highest education level: Not on file   Occupational History    Not on file   Tobacco Use    Smoking status: Former     Current packs/day: 0.00     Average packs/day: 0.5 packs/day for 10.0 years (5.0 ttl pk-yrs)     Types: Cigarettes     Start date:      Quit date:      Years since quittin.6     Passive exposure: Past    Smokeless tobacco: Never   Vaping Use    Vaping status: Never Used   Substance and Sexual Activity    Alcohol use: Yes     Comment: social    Drug use: Never    Sexual activity: Defer   Other Topics Concern    Not on file   Social History Narrative    Not on file     Social Drivers of Health     Financial

## 2025-08-20 ENCOUNTER — OFFICE VISIT (OUTPATIENT)
Age: 75
End: 2025-08-20
Payer: COMMERCIAL

## 2025-08-20 ENCOUNTER — HOSPITAL ENCOUNTER (OUTPATIENT)
Dept: ORTHOPEDIC SURGERY | Age: 75
Discharge: HOME | End: 2025-08-22
Payer: COMMERCIAL

## 2025-08-20 VITALS
WEIGHT: 155 LBS | HEIGHT: 60 IN | TEMPERATURE: 97 F | BODY MASS INDEX: 30.43 KG/M2 | SYSTOLIC BLOOD PRESSURE: 115 MMHG | HEART RATE: 78 BPM | OXYGEN SATURATION: 95 % | DIASTOLIC BLOOD PRESSURE: 86 MMHG

## 2025-08-20 DIAGNOSIS — S82.832K CLOSED AVULSION FRACTURE OF DISTAL END OF LEFT FIBULA WITH NONUNION, SUBSEQUENT ENCOUNTER: ICD-10-CM

## 2025-08-20 DIAGNOSIS — S82.832A CLOSED FRACTURE OF DISTAL END OF LEFT FIBULA, UNSPECIFIED FRACTURE MORPHOLOGY, INITIAL ENCOUNTER: ICD-10-CM

## 2025-08-20 DIAGNOSIS — S82.832A CLOSED FRACTURE OF DISTAL END OF LEFT FIBULA, UNSPECIFIED FRACTURE MORPHOLOGY, INITIAL ENCOUNTER: Primary | ICD-10-CM

## 2025-08-20 PROCEDURE — 1123F ACP DISCUSS/DSCN MKR DOCD: CPT | Performed by: STUDENT IN AN ORGANIZED HEALTH CARE EDUCATION/TRAINING PROGRAM

## 2025-08-20 PROCEDURE — 1159F MED LIST DOCD IN RCRD: CPT | Performed by: STUDENT IN AN ORGANIZED HEALTH CARE EDUCATION/TRAINING PROGRAM

## 2025-08-20 PROCEDURE — 99213 OFFICE O/P EST LOW 20 MIN: CPT | Performed by: STUDENT IN AN ORGANIZED HEALTH CARE EDUCATION/TRAINING PROGRAM

## 2025-08-20 PROCEDURE — 73610 X-RAY EXAM OF ANKLE: CPT

## 2025-08-20 PROCEDURE — 1126F AMNT PAIN NOTED NONE PRSNT: CPT | Performed by: STUDENT IN AN ORGANIZED HEALTH CARE EDUCATION/TRAINING PROGRAM

## 2025-08-22 ASSESSMENT — ENCOUNTER SYMPTOMS
SHORTNESS OF BREATH: 0
NAUSEA: 0
COLOR CHANGE: 0
VOMITING: 0

## 2025-08-25 ENCOUNTER — OFFICE VISIT (OUTPATIENT)
Dept: FAMILY MEDICINE CLINIC | Age: 75
End: 2025-08-25
Payer: COMMERCIAL

## 2025-08-25 VITALS
OXYGEN SATURATION: 97 % | BODY MASS INDEX: 30.23 KG/M2 | HEART RATE: 63 BPM | HEIGHT: 60 IN | SYSTOLIC BLOOD PRESSURE: 130 MMHG | WEIGHT: 154 LBS | DIASTOLIC BLOOD PRESSURE: 75 MMHG

## 2025-08-25 DIAGNOSIS — G30.9 MILD NEUROCOGNITIVE DISORDER DUE TO ALZHEIMER'S DISEASE (HCC): ICD-10-CM

## 2025-08-25 DIAGNOSIS — K21.9 GASTROESOPHAGEAL REFLUX DISEASE WITHOUT ESOPHAGITIS: Primary | ICD-10-CM

## 2025-08-25 DIAGNOSIS — F03.90 MAJOR NEUROCOGNITIVE DISORDER (HCC): ICD-10-CM

## 2025-08-25 DIAGNOSIS — R41.3 MEMORY LOSS: ICD-10-CM

## 2025-08-25 DIAGNOSIS — F06.70 MILD NEUROCOGNITIVE DISORDER DUE TO ALZHEIMER'S DISEASE (HCC): ICD-10-CM

## 2025-08-25 PROCEDURE — 99214 OFFICE O/P EST MOD 30 MIN: CPT | Performed by: FAMILY MEDICINE

## 2025-08-25 PROCEDURE — 1126F AMNT PAIN NOTED NONE PRSNT: CPT | Performed by: FAMILY MEDICINE

## 2025-08-25 PROCEDURE — 1123F ACP DISCUSS/DSCN MKR DOCD: CPT | Performed by: FAMILY MEDICINE

## 2025-08-25 PROCEDURE — 1159F MED LIST DOCD IN RCRD: CPT | Performed by: FAMILY MEDICINE

## 2025-08-25 PROCEDURE — 1160F RVW MEDS BY RX/DR IN RCRD: CPT | Performed by: FAMILY MEDICINE

## 2025-08-25 RX ORDER — DONEPEZIL HYDROCHLORIDE 10 MG/1
10 TABLET, FILM COATED ORAL NIGHTLY
Qty: 30 TABLET | Refills: 12 | Status: SHIPPED | OUTPATIENT
Start: 2025-08-25

## 2025-08-25 RX ORDER — PANTOPRAZOLE SODIUM 20 MG/1
20 TABLET, DELAYED RELEASE ORAL
Qty: 90 TABLET | Refills: 3 | Status: SHIPPED | OUTPATIENT
Start: 2025-08-25

## 2025-08-29 ENCOUNTER — TELEPHONE (OUTPATIENT)
Age: 75
End: 2025-08-29

## 2025-09-03 ENCOUNTER — TELEPHONE (OUTPATIENT)
Age: 75
End: 2025-09-03

## 2025-09-04 ENCOUNTER — HOSPITAL ENCOUNTER (OUTPATIENT)
Age: 75
Setting detail: SPECIMEN
Discharge: HOME | End: 2025-09-04
Payer: COMMERCIAL

## 2025-09-04 ENCOUNTER — TELEPHONE (OUTPATIENT)
Dept: INTERNAL MEDICINE | Age: 75
End: 2025-09-04

## 2025-09-04 ENCOUNTER — HOSPITAL ENCOUNTER (EMERGENCY)
Age: 75
Discharge: HOME | End: 2025-09-05
Attending: EMERGENCY MEDICINE
Payer: COMMERCIAL

## 2025-09-04 ENCOUNTER — OFFICE VISIT (OUTPATIENT)
Dept: FAMILY MEDICINE CLINIC | Age: 75
End: 2025-09-04
Payer: COMMERCIAL

## 2025-09-04 VITALS
OXYGEN SATURATION: 98 % | WEIGHT: 153 LBS | DIASTOLIC BLOOD PRESSURE: 66 MMHG | SYSTOLIC BLOOD PRESSURE: 140 MMHG | HEART RATE: 71 BPM | BODY MASS INDEX: 30.04 KG/M2 | HEIGHT: 60 IN

## 2025-09-04 DIAGNOSIS — I25.10 CORONARY ARTERY DISEASE INVOLVING NATIVE CORONARY ARTERY OF NATIVE HEART WITHOUT ANGINA PECTORIS: ICD-10-CM

## 2025-09-04 DIAGNOSIS — H51.8 OCULOMOTOR APRAXIA: ICD-10-CM

## 2025-09-04 DIAGNOSIS — E03.9 HYPOTHYROIDISM, UNSPECIFIED TYPE: ICD-10-CM

## 2025-09-04 DIAGNOSIS — Z00.00 ANNUAL PHYSICAL EXAM: ICD-10-CM

## 2025-09-04 DIAGNOSIS — R89.9 ABNORMAL LABORATORY TEST RESULT: Primary | ICD-10-CM

## 2025-09-04 DIAGNOSIS — I40.1 IDIOPATHIC MYOCARDITIS, UNSPECIFIED CHRONICITY: ICD-10-CM

## 2025-09-04 DIAGNOSIS — F06.70 MILD NEUROCOGNITIVE DISORDER DUE TO ALZHEIMER'S DISEASE (HCC): ICD-10-CM

## 2025-09-04 DIAGNOSIS — Z00.00 ANNUAL PHYSICAL EXAM: Primary | ICD-10-CM

## 2025-09-04 DIAGNOSIS — G30.9 MILD NEUROCOGNITIVE DISORDER DUE TO ALZHEIMER'S DISEASE (HCC): ICD-10-CM

## 2025-09-04 DIAGNOSIS — F31.81 MODERATE BIPOLAR II DISORDER, MOST RECENT EPISODE MAJOR DEPRESSIVE (HCC): ICD-10-CM

## 2025-09-04 DIAGNOSIS — F03.90 MAJOR NEUROCOGNITIVE DISORDER (HCC): ICD-10-CM

## 2025-09-04 PROBLEM — H02.409 PTOSIS OF EYELID: Status: RESOLVED | Noted: 2024-09-17 | Resolved: 2025-09-04

## 2025-09-04 PROBLEM — R93.1 ABNORMAL COMPUTED TOMOGRAPHY ANGIOGRAPHY OF HEART: Status: RESOLVED | Noted: 2023-09-28 | Resolved: 2025-09-04

## 2025-09-04 PROBLEM — M62.81 MUSCLE WEAKNESS (GENERALIZED): Status: RESOLVED | Noted: 2022-07-11 | Resolved: 2025-09-04

## 2025-09-04 PROBLEM — R29.818 NEUROLOGIC ABNORMALITY: Status: RESOLVED | Noted: 2025-04-09 | Resolved: 2025-09-04

## 2025-09-04 PROBLEM — D50.9 IRON DEFICIENCY ANEMIA, UNSPECIFIED: Status: RESOLVED | Noted: 2022-01-31 | Resolved: 2025-09-04

## 2025-09-04 PROBLEM — R41.89 OTHER SYMPTOMS AND SIGNS INVOLVING COGNITIVE FUNCTIONS AND AWARENESS: Status: RESOLVED | Noted: 2022-07-29 | Resolved: 2025-09-04

## 2025-09-04 PROBLEM — E66.3 OVERWEIGHT: Status: RESOLVED | Noted: 2017-09-08 | Resolved: 2025-09-04

## 2025-09-04 PROBLEM — R53.83 OTHER FATIGUE: Status: RESOLVED | Noted: 2023-09-28 | Resolved: 2025-09-04

## 2025-09-04 PROBLEM — N18.9 ANEMIA OF CHRONIC RENAL FAILURE: Status: RESOLVED | Noted: 2022-01-31 | Resolved: 2025-09-04

## 2025-09-04 PROBLEM — R06.02 SHORTNESS OF BREATH: Status: RESOLVED | Noted: 2025-06-04 | Resolved: 2025-09-04

## 2025-09-04 PROBLEM — F31.9 BIPOLAR DISORDER (HCC): Status: RESOLVED | Noted: 2017-09-08 | Resolved: 2025-09-04

## 2025-09-04 PROBLEM — G93.9 DISORDER OF BRAIN: Status: RESOLVED | Noted: 2022-07-29 | Resolved: 2025-09-04

## 2025-09-04 PROBLEM — F31.30 BIPOLAR AFFECTIVE DISORDER, CURRENT EPISODE DEPRESSED (HCC): Status: RESOLVED | Noted: 2024-05-03 | Resolved: 2025-09-04

## 2025-09-04 PROBLEM — H53.9 UNSPECIFIED VISUAL DISTURBANCE: Status: RESOLVED | Noted: 2022-07-29 | Resolved: 2025-09-04

## 2025-09-04 PROBLEM — D63.1 ANEMIA OF CHRONIC RENAL FAILURE: Status: RESOLVED | Noted: 2022-01-31 | Resolved: 2025-09-04

## 2025-09-04 PROBLEM — E66.9 OBESITY WITH BODY MASS INDEX 30 OR GREATER: Status: RESOLVED | Noted: 2022-11-07 | Resolved: 2025-09-04

## 2025-09-04 PROBLEM — F33.9 RECURRENT MAJOR DEPRESSION: Status: RESOLVED | Noted: 2022-11-07 | Resolved: 2025-09-04

## 2025-09-04 PROBLEM — J18.9 PNEUMONIA, UNSPECIFIED ORGANISM: Status: RESOLVED | Noted: 2024-02-20 | Resolved: 2025-09-04

## 2025-09-04 PROBLEM — F31.32 BIPOLAR DISORDER, CURRENT EPISODE DEPRESSED, MODERATE (HCC): Status: RESOLVED | Noted: 2024-11-05 | Resolved: 2025-09-04

## 2025-09-04 LAB
ALBUMIN SERPL-MCNC: 4.3 G/DL (ref 3.5–4.6)
ALP SERPL-CCNC: 85 U/L (ref 40–130)
ALT SERPL-CCNC: 30 U/L (ref 0–33)
ANION GAP SERPL CALCULATED.3IONS-SCNC: 11 MEQ/L (ref 9–15)
AST SERPL-CCNC: 31 U/L (ref 0–35)
BILIRUB SERPL-MCNC: 0.4 MG/DL (ref 0.2–0.7)
BUN SERPL-MCNC: 10 MG/DL (ref 8–23)
CALCIUM SERPL-MCNC: 9.4 MG/DL (ref 8.5–9.9)
CHLORIDE SERPL-SCNC: 107 MEQ/L (ref 95–107)
CHOLEST SERPL-MCNC: 121 MG/DL (ref 0–199)
CO2 SERPL-SCNC: 24 MEQ/L (ref 20–31)
CREAT SERPL-MCNC: 0.77 MG/DL (ref 0.5–0.9)
GLOBULIN SER CALC-MCNC: 2.7 G/DL (ref 2.3–3.5)
GLUCOSE FASTING: 89 MG/DL (ref 70–99)
HDLC SERPL-MCNC: 59 MG/DL (ref 40–59)
LDL CHOLESTEROL: 46 MG/DL (ref 0–129)
POTASSIUM SERPL-SCNC: 6.2 MEQ/L (ref 3.4–4.9)
PROT SERPL-MCNC: 7 G/DL (ref 6.3–8)
SODIUM SERPL-SCNC: 142 MEQ/L (ref 135–144)
TRIGLYCERIDE, FASTING: 82 MG/DL (ref 0–150)
TSH SERPL-MCNC: 2.05 UIU/ML (ref 0.44–3.86)

## 2025-09-04 PROCEDURE — 80061 LIPID PANEL: CPT

## 2025-09-04 PROCEDURE — 84443 ASSAY THYROID STIM HORMONE: CPT

## 2025-09-04 PROCEDURE — 99387 INIT PM E/M NEW PAT 65+ YRS: CPT | Performed by: FAMILY MEDICINE

## 2025-09-04 PROCEDURE — 80053 COMPREHEN METABOLIC PANEL: CPT

## 2025-09-04 PROCEDURE — 83036 HEMOGLOBIN GLYCOSYLATED A1C: CPT

## 2025-09-04 PROCEDURE — 1159F MED LIST DOCD IN RCRD: CPT | Performed by: FAMILY MEDICINE

## 2025-09-04 PROCEDURE — 1126F AMNT PAIN NOTED NONE PRSNT: CPT | Performed by: FAMILY MEDICINE

## 2025-09-04 PROCEDURE — 36415 COLL VENOUS BLD VENIPUNCTURE: CPT | Performed by: FAMILY MEDICINE

## 2025-09-04 RX ORDER — FLUOXETINE 10 MG/1
30 TABLET, FILM COATED ORAL DAILY
COMMUNITY

## 2025-09-05 VITALS
BODY MASS INDEX: 30.04 KG/M2 | OXYGEN SATURATION: 98 % | HEART RATE: 56 BPM | RESPIRATION RATE: 18 BRPM | TEMPERATURE: 97.4 F | WEIGHT: 153 LBS | HEIGHT: 60 IN | DIASTOLIC BLOOD PRESSURE: 74 MMHG | SYSTOLIC BLOOD PRESSURE: 179 MMHG

## 2025-09-05 LAB
ANION GAP SERPL CALCULATED.3IONS-SCNC: 11 MEQ/L (ref 9–15)
BASOPHILS # BLD: 0.1 K/UL (ref 0–0.1)
BASOPHILS NFR BLD: 1 % (ref 0.1–1.2)
BUN SERPL-MCNC: 14 MG/DL (ref 8–23)
CALCIUM SERPL-MCNC: 9.2 MG/DL (ref 8.5–9.9)
CHLORIDE SERPL-SCNC: 107 MEQ/L (ref 95–107)
CO2 SERPL-SCNC: 26 MEQ/L (ref 20–31)
CREAT SERPL-MCNC: 0.7 MG/DL (ref 0.5–0.9)
EKG ATRIAL RATE: 60 BPM
EKG DIAGNOSIS: NORMAL
EKG P AXIS: 30 DEGREES
EKG P-R INTERVAL: 218 MS
EKG Q-T INTERVAL: 476 MS
EKG QRS DURATION: 92 MS
EKG QTC CALCULATION (BAZETT): 476 MS
EKG R AXIS: 101 DEGREES
EKG T AXIS: -22 DEGREES
EKG VENTRICULAR RATE: 60 BPM
EOSINOPHIL # BLD: 0.4 K/UL (ref 0–0.4)
EOSINOPHIL NFR BLD: 5 % (ref 0.7–5.8)
ERYTHROCYTE [DISTWIDTH] IN BLOOD BY AUTOMATED COUNT: 13 % (ref 11.7–14.4)
ESTIMATED AVERAGE GLUCOSE: 108 MG/DL
GLUCOSE SERPL-MCNC: 118 MG/DL (ref 70–99)
HBA1C MFR BLD: 5.4 % (ref 4–6)
HCT VFR BLD AUTO: 40.1 % (ref 37–47)
HGB BLD-MCNC: 13.1 G/DL (ref 11.2–15.7)
IMM GRANULOCYTES # BLD: 0 K/UL
IMM GRANULOCYTES NFR BLD: 0.5 %
LYMPHOCYTES # BLD: 2.3 K/UL (ref 1.2–3.7)
LYMPHOCYTES NFR BLD: 27.7 %
MAGNESIUM SERPL-MCNC: 2.3 MG/DL (ref 1.7–2.4)
MCH RBC QN AUTO: 30.7 PG (ref 25.6–32.2)
MCHC RBC AUTO-ENTMCNC: 32.7 % (ref 32.2–35.5)
MCV RBC AUTO: 93.9 FL (ref 79.4–94.8)
MONOCYTES # BLD: 0.7 K/UL (ref 0.2–0.9)
MONOCYTES NFR BLD: 8.3 % (ref 4.7–12.5)
NEUTROPHILS # BLD: 4.8 K/UL (ref 1.6–6.1)
NEUTS SEG NFR BLD: 57.5 % (ref 34–71.1)
PLATELET # BLD AUTO: 267 K/UL (ref 182–369)
POTASSIUM SERPL-SCNC: 3.9 MEQ/L (ref 3.4–4.9)
RBC # BLD AUTO: 4.27 M/UL (ref 3.93–5.22)
SODIUM SERPL-SCNC: 144 MEQ/L (ref 135–144)
WBC # BLD AUTO: 8.2 K/UL (ref 4–10)

## 2025-09-05 PROCEDURE — 83735 ASSAY OF MAGNESIUM: CPT

## 2025-09-05 PROCEDURE — 93010 ELECTROCARDIOGRAM REPORT: CPT | Performed by: INTERNAL MEDICINE

## 2025-09-05 PROCEDURE — 2580000003 HC RX 258: Performed by: EMERGENCY MEDICINE

## 2025-09-05 PROCEDURE — 93005 ELECTROCARDIOGRAM TRACING: CPT

## 2025-09-05 PROCEDURE — 36415 COLL VENOUS BLD VENIPUNCTURE: CPT

## 2025-09-05 PROCEDURE — 80048 BASIC METABOLIC PNL TOTAL CA: CPT

## 2025-09-05 PROCEDURE — 85025 COMPLETE CBC W/AUTO DIFF WBC: CPT

## 2025-09-05 RX ORDER — 0.9 % SODIUM CHLORIDE 0.9 %
1000 INTRAVENOUS SOLUTION INTRAVENOUS ONCE
Status: COMPLETED | OUTPATIENT
Start: 2025-09-05 | End: 2025-09-05

## 2025-09-05 RX ADMIN — SODIUM CHLORIDE 1000 ML: 0.9 INJECTION, SOLUTION INTRAVENOUS at 00:17

## 2025-09-05 ASSESSMENT — ENCOUNTER SYMPTOMS
COLOR CHANGE: 0
CONSTIPATION: 0
BACK PAIN: 0
EYE PAIN: 0
WHEEZING: 0
ABDOMINAL PAIN: 0
RHINORRHEA: 0
APNEA: 0
NAUSEA: 0
COUGH: 0
SINUS PRESSURE: 0
PHOTOPHOBIA: 0
SORE THROAT: 0
ABDOMINAL DISTENTION: 0
VOMITING: 0
DIARRHEA: 0
SHORTNESS OF BREATH: 0

## 2025-09-05 ASSESSMENT — PAIN - FUNCTIONAL ASSESSMENT: PAIN_FUNCTIONAL_ASSESSMENT: 0-10

## 2025-09-05 ASSESSMENT — LIFESTYLE VARIABLES
HOW MANY STANDARD DRINKS CONTAINING ALCOHOL DO YOU HAVE ON A TYPICAL DAY: PATIENT DOES NOT DRINK
HOW OFTEN DO YOU HAVE A DRINK CONTAINING ALCOHOL: NEVER

## 2025-09-05 ASSESSMENT — PAIN SCALES - GENERAL: PAINLEVEL_OUTOF10: 0

## (undated) DEVICE — GLIDESHEATH SLENDER STAINLESS STEEL KIT: Brand: GLIDESHEATH SLENDER

## (undated) DEVICE — GUIDEWIRE VASC L260CM DIA0.035IN TIP L3MM STD EXCHG PTFE J

## (undated) DEVICE — BAND COMPR L24CM REG CLR PLAS HEMSTAT EXT HK AND LOOP RETEN

## (undated) DEVICE — HI-TORQUE SUPRA CORE .035 PERIPHERAL GUIDE WIRE .035 X 300 CM: Brand: HI-TORQUE SUPRA CORE

## (undated) DEVICE — SHEET,DRAPE,53X77,STERILE: Brand: MEDLINE

## (undated) DEVICE — GUIDEWIRE VASC L190CM DIA0.014IN ELASTINITE NIT HYDRPHLC

## (undated) DEVICE — CATHETER GUID 6FR 0.071IN COR AMPLATZ R 2 MID FLEXIBILITY

## (undated) DEVICE — CATHETER DIAG 5FR L110CM PIG CRV SZ 6 SIDE H DBL BRAID WIRE

## (undated) DEVICE — CATH BLLN ANGIO 2.50X15MM SC EUPHORA RX

## (undated) DEVICE — CATH BLLN ANGIO 3.50X27MM NC EUPHORIA RX

## (undated) DEVICE — KIT ANGIO W/ AT P65 PREM HND CTRL FOR CNTRST DEL ANGIOTOUCH

## (undated) DEVICE — DEVICE INFLATION ANGIO 30ATM

## (undated) DEVICE — COPILOT KIT INCLUDES BLEEDBACK CONTROL VALVE / GUIDE WIRE INTRODUCER / TORQUE DEVICE: Brand: ACCESSORIES

## (undated) DEVICE — PACK PROCEDURE SURG SURG CARDIAC CATH

## (undated) DEVICE — CATHETER DIAG 5FR L100CM LUMN ID0.047IN JL3.5 CRV 0 SIDE H

## (undated) DEVICE — Device

## (undated) DEVICE — CATHETER COR DIAG AMPLATZ R 2.0 CRV 5FR 100CM 0 SIDE H

## (undated) DEVICE — DRAPE SURG BRACH STRL